# Patient Record
Sex: MALE | Race: WHITE | NOT HISPANIC OR LATINO | Employment: FULL TIME | URBAN - METROPOLITAN AREA
[De-identification: names, ages, dates, MRNs, and addresses within clinical notes are randomized per-mention and may not be internally consistent; named-entity substitution may affect disease eponyms.]

---

## 2017-03-27 DIAGNOSIS — Z13.220 SCREENING FOR HYPERLIPIDEMIA: ICD-10-CM

## 2017-03-27 DIAGNOSIS — B20 HUMAN IMMUNODEFICIENCY VIRUS (HIV) DISEASE (H): ICD-10-CM

## 2017-03-27 DIAGNOSIS — B20 HUMAN IMMUNODEFICIENCY VIRUS (HIV) DISEASE (H): Primary | ICD-10-CM

## 2017-03-27 LAB
ALBUMIN SERPL-MCNC: 4.3 G/DL (ref 3.4–5)
ALP SERPL-CCNC: 98 U/L (ref 40–150)
ALT SERPL W P-5'-P-CCNC: 92 U/L (ref 0–70)
AMYLASE SERPL-CCNC: 66 U/L (ref 30–110)
ANION GAP SERPL CALCULATED.3IONS-SCNC: 9 MMOL/L (ref 3–14)
AST SERPL W P-5'-P-CCNC: 46 U/L (ref 0–45)
BASOPHILS # BLD AUTO: 0 10E9/L (ref 0–0.2)
BASOPHILS NFR BLD AUTO: 0.1 %
BILIRUB SERPL-MCNC: 0.2 MG/DL (ref 0.2–1.3)
BUN SERPL-MCNC: 10 MG/DL (ref 7–30)
CALCIUM SERPL-MCNC: 8.6 MG/DL (ref 8.5–10.1)
CHLORIDE SERPL-SCNC: 104 MMOL/L (ref 94–109)
CHOLEST SERPL-MCNC: 182 MG/DL
CO2 SERPL-SCNC: 28 MMOL/L (ref 20–32)
CREAT SERPL-MCNC: 0.83 MG/DL (ref 0.66–1.25)
DIFFERENTIAL METHOD BLD: NORMAL
EOSINOPHIL # BLD AUTO: 0.1 10E9/L (ref 0–0.7)
EOSINOPHIL NFR BLD AUTO: 1.6 %
ERYTHROCYTE [DISTWIDTH] IN BLOOD BY AUTOMATED COUNT: 13.6 % (ref 10–15)
GFR SERPL CREATININE-BSD FRML MDRD: ABNORMAL ML/MIN/1.7M2
GLUCOSE SERPL-MCNC: 89 MG/DL (ref 70–99)
HCT VFR BLD AUTO: 42.8 % (ref 40–53)
HDLC SERPL-MCNC: 41 MG/DL
HGB BLD-MCNC: 14.7 G/DL (ref 13.3–17.7)
IMM GRANULOCYTES # BLD: 0 10E9/L (ref 0–0.4)
IMM GRANULOCYTES NFR BLD: 0.3 %
LDLC SERPL CALC-MCNC: 110 MG/DL
LIPASE SERPL-CCNC: 220 U/L (ref 73–393)
LYMPHOCYTES # BLD AUTO: 2.6 10E9/L (ref 0.8–5.3)
LYMPHOCYTES NFR BLD AUTO: 38.8 %
MCH RBC QN AUTO: 28.3 PG (ref 26.5–33)
MCHC RBC AUTO-ENTMCNC: 34.3 G/DL (ref 31.5–36.5)
MCV RBC AUTO: 83 FL (ref 78–100)
MONOCYTES # BLD AUTO: 0.5 10E9/L (ref 0–1.3)
MONOCYTES NFR BLD AUTO: 7.9 %
NEUTROPHILS # BLD AUTO: 3.4 10E9/L (ref 1.6–8.3)
NEUTROPHILS NFR BLD AUTO: 51.3 %
NONHDLC SERPL-MCNC: 140 MG/DL
NRBC # BLD AUTO: 0 10*3/UL
NRBC BLD AUTO-RTO: 0 /100
PLATELET # BLD AUTO: 260 10E9/L (ref 150–450)
POTASSIUM SERPL-SCNC: 3.8 MMOL/L (ref 3.4–5.3)
PROT SERPL-MCNC: 7.9 G/DL (ref 6.8–8.8)
RBC # BLD AUTO: 5.19 10E12/L (ref 4.4–5.9)
SODIUM SERPL-SCNC: 140 MMOL/L (ref 133–144)
TRIGL SERPL-MCNC: 153 MG/DL
WBC # BLD AUTO: 6.7 10E9/L (ref 4–11)

## 2017-03-27 NOTE — NURSING NOTE
Per [facility] Ambulatory Care Protocol, Pt is due for routine labs based on disease state or monitoring of medications. Lab orders entered.  Serenity Martin RN

## 2017-03-28 LAB
CD3 CELLS # BLD: 2093 CELLS/UL (ref 603–2990)
CD3 CELLS NFR BLD: 79 % (ref 49–84)
CD3+CD4+ CELLS # BLD: 990 CELLS/UL (ref 441–2156)
CD3+CD4+ CELLS NFR BLD: 37 % (ref 28–63)
CD3+CD4+ CELLS/CD3+CD8+ CLL BLD: 1.09 % (ref 1.4–2.6)
CD3+CD8+ CELLS # BLD: 914 CELLS/UL (ref 125–1312)
CD3+CD8+ CELLS NFR BLD: 34 % (ref 10–40)
HIV1 RNA # PLAS NAA DL=20: NORMAL {COPIES}/ML
HIV1 RNA SERPL NAA+PROBE-LOG#: NORMAL {LOG_COPIES}/ML
IFC SPECIMEN: ABNORMAL
IMMUNODEFICIENCY MARKERS SPEC-IMP: ABNORMAL

## 2017-04-06 ENCOUNTER — OFFICE VISIT (OUTPATIENT)
Dept: INFECTIOUS DISEASES | Facility: CLINIC | Age: 42
End: 2017-04-06
Attending: COLON & RECTAL SURGERY
Payer: COMMERCIAL

## 2017-04-06 VITALS
SYSTOLIC BLOOD PRESSURE: 126 MMHG | HEIGHT: 69 IN | DIASTOLIC BLOOD PRESSURE: 83 MMHG | TEMPERATURE: 98.3 F | HEART RATE: 83 BPM | WEIGHT: 157.8 LBS | BODY MASS INDEX: 23.37 KG/M2

## 2017-04-06 DIAGNOSIS — B20 HUMAN IMMUNODEFICIENCY VIRUS (HIV) DISEASE (H): Primary | ICD-10-CM

## 2017-04-06 PROCEDURE — 99213 OFFICE O/P EST LOW 20 MIN: CPT | Mod: ZF

## 2017-04-06 ASSESSMENT — PAIN SCALES - GENERAL: PAINLEVEL: NO PAIN (0)

## 2017-04-06 NOTE — PROGRESS NOTES
Infectious Disease Clinic  HIV Follow Up Visit    Chief Complaint:  RECHECK (follow up with B20)    HPI:  Shan Rashid is a 42 year old male who is followed for HIV.   Patient was last seen 4/7/16.  Since this time, he reports doing well.  He states that he has maybe missed 1 or 2 doses of his Atripla all year. He has not had any new infections, or hospitalization since last visit. He denies fever, chills, headache, vision changes, oral ulcers/plaques, SOB/cough, abdominal pain/N/V, urinary symptoms, diarrhea, genital ulcers / sores.     He is monogamous with his . His  is HIV uninfected and takes PreP.     Past Medical History:  1. HIV diagnosed in 07/2009. Started on Atripla 08/2009. CD4 stanislav 234 (11%) in 07/2009.   2. Recurrent skin abscesses, last in 2011.   3. History of chlamydia treated in 2011.   4. History of depression, now resolved.   5. GERD.     Allergies: No known drug allergies    Current Medications::   1. Atripla.   2. Multivitamin.       ROS: Complete 12-point ROS is negative except as noted above. A 12-point review of systems was obtained, pertinent positives and negatives as above.     Social History:  Patient drinks 2-3 beers a week. He does not smoke or use ilicit drugs.  He is monogamous with his .  takes PreP.     Family Medical History:  Reviewed and unchanged from prior.    Exam:  B/P: 126/83, T: 98.3, P: 83, R: Data Unavailable, Weight:   Wt Readings from Last 2 Encounters:   04/06/17 71.6 kg (157 lb 12.8 oz)   04/07/16 70.6 kg (155 lb 9.6 oz)     GENERAL: He is a healthy-appearing gentleman in no acute distress.   HEENT: Oropharynx moist and clear with no lesions or exudate.   NECK: Supple and nontender with no lymphadenopathy.   LUNGS: Clear to auscultation bilaterally.   HEART: Regular rate and rhythm with no murmurs, rubs or gallops.   ABDOMEN: Soft, nontender, nondistended   EXTREMITIES: Warm and without edema.   SKIN: No acute rashes or lesions.    NEUROLOGIC: Alert and oriented. Gait unremarkable.     Labs:  T Cell Subset:  Absolute CD4   Date Value Ref Range Status   03/27/2017 990 441 - 2156 cells/uL Final     CD4 [place] T   Date Value Ref Range Status   03/27/2017 37 28 - 63 % Final     CD8 Suppressor T   Date Value Ref Range Status   03/27/2017 34 10 - 40 % Final     CD3 Mature T   Date Value Ref Range Status   03/27/2017 79 49 - 84 % Final     CD4:CD8 Ratio   Date Value Ref Range Status   03/27/2017 1.09 (L) 1.40 - 2.60 Final     WBC   Date Value Ref Range Status   03/27/2017 6.7 4.0 - 11.0 10e9/L Final     % Lymphocytes   Date Value Ref Range Status   03/27/2017 38.8 % Final     Absolute CD3   Date Value Ref Range Status   03/27/2017 2093 603 - 2990 cells/uL Final     Absolute CD8   Date Value Ref Range Status   03/27/2017 914 125 - 1312 cells/uL Final       HIV-1 RNA Quantitative:  HIV-1 RNA Quant Result   Date Value Ref Range Status   03/27/2017  HIVND [Copies]/mL Final    HIV-1 RNA Not Detected   The KORINA AmpliPrep/KORINA TaqMan HIV-1 test is an FDA-approved in vitro nucleic   acid amplification test for the quantitation of HIV-1 RNA in human plasma (EDTA   plasma) using the KORINA AmpliPrep instrument for automated viral nucleic acid   extraction and the KORINA TaqMan Analyzer or KORINA TaqMan for automated Real   Time PCR amplification and detection of the viral nucleic acid target.   Titer results are reported in copies/ml. This assay is intended for use in   conjunction with clinical presentation and other laboratory markers of disease   prognosis and for use as an aid in assessing viral response to antiretroviral   treatment as measured by changes in plasma HIV-1 RNA levels. This test should   not be used as a donor screening test to confirm the presence of HIV-1   infection.           Assessment and Plan:  Shan Rashid is a 42 year old male with HIV infection who presents for routine follow up.     1. HIV  He has been well controlled on  Atripla since ~ 2009. His viral load is undetectable and his CD4 990 today. We discussed switching away from Atripla and on to Genvoya due to concerns for long term toxicity Atripla (pt actually denies side effects completey) and the patient is agreeable.   -  Pt will finish up his supply of Atripla (approx 2 weeks) then switch to Genvoya    2. Transaminitis  Mild. Pt denies significant alcohol or acetaminophen use. Likely related to anti-retroviral meds.   - repeat in 6-8 weeks once on Genvoya    3. Routine HIV care.          Prophylaxis:   a. None indicated given CD4 count.          Routine Infectious disease screening.    a. Hepatitis B immune.    b. Hepatitis C negative.    c. Toxo IgG negative.    d. CMV IgG positive.    e. QuantiFERON Gold negative in 07/2009 with no risk factors for acquisition since.    f. Declines STI screening today as he is in a monogamous relationship.         Vaccination status.    a. Hepatitis A vaccine series complete, Hep A immune   b. Hepatitis B immune.    c. Pneumovax given in 2009, booster 2015.    d. Prevnar 07/2014.    e. Will encourage yearly influenza vaccination.    f. Menactra series complete.         Cardiovascular and renal risk factors.    a. Creatinine and fasting glucose have been within normal limits.   c. Lipids checked 3/2017: , total cholesterol 182    d. BP normal today (136/83)   e. He is a nonsmoker.    f. He is a healthy weight and exercises regularly.           Cancer screening.    a. due for Anal Pap. Will plan on completing at next visit.        Follow up will be in 6-8 weeks for repeat labs after switch to Genvoya. Anal Pap at that time.     Patient seen and discussed with attending physician Dr. Niecy Cmaejo  PGY-3 Internal Medicine  [facility] [phone]    Attending Attestation:  I evaluated Mr. Rashid with resident Dr. Camejo.  I have reviewed today's labs, vitals and imaging results. This note reflects our joint findings, assessment and  recommendations. Will switch from Atripla to Genvoya today and have him return to clinic about a month after this switch. He knows to contact me if issues arise prior.     Lisa Pemberton MD  [phone]

## 2017-04-06 NOTE — NURSING NOTE
"Chief Complaint   Patient presents with     RECHECK     follow up with B20, tzimmer cma       Initial /83  Pulse 83  Temp 98.3  F (36.8  C) (Oral)  Ht 1.753 m (5' 9\")  Wt 71.6 kg (157 lb 12.8 oz)  BMI 23.3 kg/m2 Estimated body mass index is 23.3 kg/(m^2) as calculated from the following:    Height as of this encounter: 1.753 m (5' 9\").    Weight as of this encounter: 71.6 kg (157 lb 12.8 oz).  Medication Reconciliation: complete    "

## 2017-04-06 NOTE — MR AVS SNAPSHOT
"              After Visit Summary   2017    Shan Rashid    MRN: [id number]           Patient Information     Date Of Birth          1975        Visit Information        Provider Department      2017 1:30 PM Lisa Pemberton MD [facility] Infectious Diseases        Today's Diagnoses     Human immunodeficiency virus (HIV) disease (H)    -  1       Follow-ups after your visit        Follow-up notes from your care team     Return in about 8 weeks (around 2017) for Routine Visit.      Who to contact     If you have questions or need follow up information about today's clinic visit or your schedule please contact [facility] AND INFECTIOUS DISEASES directly at [phone].  Normal or non-critical lab and imaging results will be communicated to you by MyChart, letter or phone within 4 business days after the clinic has received the results. If you do not hear from us within 7 days, please contact the clinic through [employer] or phone. If you have a critical or abnormal lab result, we will notify you by phone as soon as possible.  Submit refill requests through [employer] or call your pharmacy and they will forward the refill request to us. Please allow 3 business days for your refill to be completed.          Additional Information About Your Visit        MyChart Information     [employer] lets you send messages to your doctor, view your test results, renew your prescriptions, schedule appointments and more. To sign up, go to [email or web address] . Click on \"Log in\" on the left side of the screen, which will take you to the Welcome page. Then click on \"Sign up Now\" on the right side of the page.     You will be asked to enter the access code listed below, as well as some personal information. Please follow the directions to create your username and password.     Your access code is: [email or web address]  Expires: 2017  6:30 AM     Your access code will  in 90 days. " "If you need help or a new code, please call your [place] clinic or [phone].        Care EveryWhere ID     This is your Care EveryWhere ID. This could be used by other organizations to access your [place] medical records  [id number]        Your Vitals Were     Pulse Temperature Height BMI (Body Mass Index)          83 98.3  F (36.8  C) (Oral) 1.753 m (5' 9\") 23.3 kg/m2         Blood Pressure from Last 3 Encounters:   04/06/17 126/83   04/07/16 (!) 124/97   08/13/15 (!) 125/92    Weight from Last 3 Encounters:   04/06/17 71.6 kg (157 lb 12.8 oz)   04/07/16 70.6 kg (155 lb 9.6 oz)   08/13/15 68.4 kg (150 lb 14.4 oz)                 Today's Medication Changes          These changes are accurate as of: 4/6/17 11:59 PM.  If you have any questions, ask your nurse or doctor.               Stop taking these medicines if you haven't already. Please contact your care team if you have questions.     ATRIPLA 600-200-300 MG per tablet   Generic drug:  efavirenz-emtrictabine-tenofovir   Stopped by:  Lisa Pmeberton MD           sulfamethoxazole-trimethoprim 800-160 MG per tablet   Commonly known as:  BACTRIM DS   Stopped by:  Lisa Pemberton MD                    Primary Care Provider    None Doctor, MD       No address on file        Thank you!     Thank you for choosing [facility] AND INFECTIOUS DISEASES  for your care. Our goal is always to provide you with excellent care. Hearing back from our patients is one way we can continue to improve our services. Please take a few minutes to complete the written survey that you may receive in the mail after your visit with us. Thank you!             Your Updated Medication List - Protect others around you: Learn how to safely use, store and throw away your medicines at www.disposemymeds.org.          This list is accurate as of: 4/6/17 11:59 PM.  Always use your most recent med list.                   Brand Name Dispense Instructions for use    " DAILY MULTIVITAMIN PO      Take  by mouth daily.

## 2017-04-06 NOTE — LETTER
4/6/2017       RE: Shan Rashid  [address]  [place] [address]     Dear Colleague,    Thank you for referring your patient, Shan Rashid, to the [facility] AND INFECTIOUS DISEASES at [facility]. Please see a copy of my visit note below.    Infectious Disease Clinic  HIV Follow Up Visit    Chief Complaint:  RECHECK (follow up with B20)    HPI:  Shan Rashid is a 42 year old male who is followed for HIV.   Patient was last seen 4/7/16.  Since this time, he reports doing well.  He states that he has maybe missed 1 or 2 doses of his Atripla all year. He has not had any new infections, or hospitalization since last visit. He denies fever, chills, headache, vision changes, oral ulcers/plaques, SOB/cough, abdominal pain/N/V, urinary symptoms, diarrhea, genital ulcers / sores.     He is monogamous with his . His  is HIV uninfected and takes PreP.     Past Medical History:  1. HIV diagnosed in 07/2009. Started on Atripla 08/2009. CD4 stanislav 234 (11%) in 07/2009.   2. Recurrent skin abscesses, last in 2011.   3. History of chlamydia treated in 2011.   4. History of depression, now resolved.   5. GERD.     Allergies: No known drug allergies    Current Medications::   1. Atripla.   2. Multivitamin.       ROS: Complete 12-point ROS is negative except as noted above. A 12-point review of systems was obtained, pertinent positives and negatives as above.     Social History:  Patient drinks 2-3 beers a week. He does not smoke or use ilicit drugs.  He is monogamous with his .  takes PreP.     Family Medical History:  Reviewed and unchanged from prior.    Exam:  B/P: 126/83, T: 98.3, P: 83, R: Data Unavailable, Weight:   Wt Readings from Last 2 Encounters:   04/06/17 71.6 kg (157 lb 12.8 oz)   04/07/16 70.6 kg (155 lb 9.6 oz)     GENERAL: He is a healthy-appearing gentleman in no acute distress.   HEENT: Oropharynx moist and  clear with no lesions or exudate.   NECK: Supple and nontender with no lymphadenopathy.   LUNGS: Clear to auscultation bilaterally.   HEART: Regular rate and rhythm with no murmurs, rubs or gallops.   ABDOMEN: Soft, nontender, nondistended   EXTREMITIES: Warm and without edema.   SKIN: No acute rashes or lesions.   NEUROLOGIC: Alert and oriented. Gait unremarkable.     Labs:  T Cell Subset:  Absolute CD4   Date Value Ref Range Status   03/27/2017 990 441 - 2156 cells/uL Final     CD4 [place] T   Date Value Ref Range Status   03/27/2017 37 28 - 63 % Final     CD8 Suppressor T   Date Value Ref Range Status   03/27/2017 34 10 - 40 % Final     CD3 Mature T   Date Value Ref Range Status   03/27/2017 79 49 - 84 % Final     CD4:CD8 Ratio   Date Value Ref Range Status   03/27/2017 1.09 (L) 1.40 - 2.60 Final     WBC   Date Value Ref Range Status   03/27/2017 6.7 4.0 - 11.0 10e9/L Final     % Lymphocytes   Date Value Ref Range Status   03/27/2017 38.8 % Final     Absolute CD3   Date Value Ref Range Status   03/27/2017 2093 603 - 2990 cells/uL Final     Absolute CD8   Date Value Ref Range Status   03/27/2017 914 125 - 1312 cells/uL Final       HIV-1 RNA Quantitative:  HIV-1 RNA Quant Result   Date Value Ref Range Status   03/27/2017  HIVND [Copies]/mL Final    HIV-1 RNA Not Detected   The KORINA AmpliPrep/KORINA TaqMan HIV-1 test is an FDA-approved in vitro nucleic   acid amplification test for the quantitation of HIV-1 RNA in human plasma (EDTA   plasma) using the KORINA AmpliPrep instrument for automated viral nucleic acid   extraction and the KORINA TaqMan Analyzer or KORINA TaqMan for automated Real   Time PCR amplification and detection of the viral nucleic acid target.   Titer results are reported in copies/ml. This assay is intended for use in   conjunction with clinical presentation and other laboratory markers of disease   prognosis and for use as an aid in assessing viral response to antiretroviral   treatment as measured  by changes in plasma HIV-1 RNA levels. This test should   not be used as a donor screening test to confirm the presence of HIV-1   infection.           Assessment and Plan:  Shan Rashid is a 42 year old male with HIV infection who presents for routine follow up.     1. HIV  He has been well controlled on Atripla since ~ 2009. His viral load is undetectable and his CD4 990 today. We discussed switching away from Atripla and on to Genvoya due to concerns for long term toxicity Atripla (pt actually denies side effects completey) and the patient is agreeable.   -  Pt will finish up his supply of Atripla (approx 2 weeks) then switch to Genvoya    2. Transaminitis  Mild. Pt denies significant alcohol or acetaminophen use. Likely related to anti-retroviral meds.   - repeat in 6-8 weeks once on Genvoya    3. Routine HIV care.          Prophylaxis:   a. None indicated given CD4 count.          Routine Infectious disease screening.    a. Hepatitis B immune.    b. Hepatitis C negative.    c. Toxo IgG negative.    d. CMV IgG positive.    e. QuantiFERON Gold negative in 07/2009 with no risk factors for acquisition since.    f. Declines STI screening today as he is in a monogamous relationship.         Vaccination status.    a. Hepatitis A vaccine series complete, Hep A immune   b. Hepatitis B immune.    c. Pneumovax given in 2009, booster 2015.    d. Prevnar 07/2014.    e. Will encourage yearly influenza vaccination.    f. Menactra series complete.         Cardiovascular and renal risk factors.    a. Creatinine and fasting glucose have been within normal limits.   c. Lipids checked 3/2017: , total cholesterol 182    d. BP normal today (136/83)   e. He is a nonsmoker.    f. He is a healthy weight and exercises regularly.           Cancer screening.    a. due for Anal Pap. Will plan on completing at next visit.        Follow up will be in 6-8 weeks for repeat labs after switch to Genvoya. Anal Pap at that time.      Patient seen and discussed with attending physician Dr. Niecy Camejo  PGY-3 Internal Medicine  pgr [phone]    Attending Attestation:  I evaluated Mr. Rashid with resident Dr. Camejo.  I have reviewed today's labs, vitals and imaging results. This note reflects our joint findings, assessment and recommendations. Will switch from Atripla to Genvoya today and have him return to clinic about a month after this switch. He knows to contact me if issues arise prior.     Lisa Pemberton MD  [phone]        Again, thank you for allowing me to participate in the care of your patient.      Sincerely,    Lisa Pemberton MD

## 2017-04-06 NOTE — LETTER
Date:April 11, 2017      Patient was self referred, no letter generated. Do not send.        [facility] Information

## 2017-04-11 DIAGNOSIS — B20 HUMAN IMMUNODEFICIENCY VIRUS (HIV) DISEASE (H): Primary | ICD-10-CM

## 2017-05-17 DIAGNOSIS — B20 HUMAN IMMUNODEFICIENCY VIRUS (HIV) DISEASE (H): ICD-10-CM

## 2017-05-17 LAB
ALBUMIN SERPL-MCNC: 3.9 G/DL (ref 3.4–5)
ALP SERPL-CCNC: 94 U/L (ref 40–150)
ALT SERPL W P-5'-P-CCNC: 78 U/L (ref 0–70)
ANION GAP SERPL CALCULATED.3IONS-SCNC: 10 MMOL/L (ref 3–14)
AST SERPL W P-5'-P-CCNC: 41 U/L (ref 0–45)
BILIRUB SERPL-MCNC: 0.2 MG/DL (ref 0.2–1.3)
BUN SERPL-MCNC: 11 MG/DL (ref 7–30)
CALCIUM SERPL-MCNC: 8.6 MG/DL (ref 8.5–10.1)
CHLORIDE SERPL-SCNC: 103 MMOL/L (ref 94–109)
CO2 SERPL-SCNC: 24 MMOL/L (ref 20–32)
CREAT SERPL-MCNC: 0.79 MG/DL (ref 0.66–1.25)
ERYTHROCYTE [DISTWIDTH] IN BLOOD BY AUTOMATED COUNT: 13 % (ref 10–15)
GFR SERPL CREATININE-BSD FRML MDRD: ABNORMAL ML/MIN/1.7M2
GLUCOSE SERPL-MCNC: 112 MG/DL (ref 70–99)
HCT VFR BLD AUTO: 40.7 % (ref 40–53)
HGB BLD-MCNC: 14.1 G/DL (ref 13.3–17.7)
MCH RBC QN AUTO: 29.1 PG (ref 26.5–33)
MCHC RBC AUTO-ENTMCNC: 34.6 G/DL (ref 31.5–36.5)
MCV RBC AUTO: 84 FL (ref 78–100)
PLATELET # BLD AUTO: 226 10E9/L (ref 150–450)
POTASSIUM SERPL-SCNC: 3.5 MMOL/L (ref 3.4–5.3)
PROT SERPL-MCNC: 7.5 G/DL (ref 6.8–8.8)
RBC # BLD AUTO: 4.84 10E12/L (ref 4.4–5.9)
SODIUM SERPL-SCNC: 138 MMOL/L (ref 133–144)
WBC # BLD AUTO: 5.5 10E9/L (ref 4–11)

## 2017-05-19 LAB
HIV1 RNA # PLAS NAA DL=20: ABNORMAL {COPIES}/ML
HIV1 RNA SERPL NAA+PROBE-LOG#: <1.3 {LOG_COPIES}/ML

## 2017-05-24 ENCOUNTER — TELEPHONE (OUTPATIENT)
Dept: INFECTIOUS DISEASES | Facility: CLINIC | Age: 42
End: 2017-05-24

## 2017-05-25 ENCOUNTER — OFFICE VISIT (OUTPATIENT)
Dept: INFECTIOUS DISEASES | Facility: CLINIC | Age: 42
End: 2017-05-25
Attending: COLON & RECTAL SURGERY
Payer: COMMERCIAL

## 2017-05-25 VITALS
WEIGHT: 158.9 LBS | SYSTOLIC BLOOD PRESSURE: 144 MMHG | HEIGHT: 69 IN | TEMPERATURE: 97.6 F | BODY MASS INDEX: 23.54 KG/M2 | HEART RATE: 78 BPM | DIASTOLIC BLOOD PRESSURE: 89 MMHG

## 2017-05-25 DIAGNOSIS — Z21 ASYMPTOMATIC HUMAN IMMUNODEFICIENCY VIRUS (HIV) INFECTION STATUS (H): Primary | ICD-10-CM

## 2017-05-25 PROCEDURE — 99212 OFFICE O/P EST SF 10 MIN: CPT | Mod: ZF

## 2017-05-25 PROCEDURE — 88112 CYTOPATH CELL ENHANCE TECH: CPT | Performed by: COLON & RECTAL SURGERY

## 2017-05-25 ASSESSMENT — PAIN SCALES - GENERAL: PAINLEVEL: NO PAIN (0)

## 2017-05-25 NOTE — LETTER
5/25/2017       RE: Shan Rashid  [address]  [place] [address]     Dear Colleague,    Thank you for referring your patient, Shan Rashid, to the [facility] AND INFECTIOUS DISEASES at [facility]. Please see a copy of my visit note below.    Infectious Disease Clinic  HIV Follow Up Visit    Chief Complaint:  RECHECK (follow up B20, medication check)    HPI:  Shan Rashid is a 42 year old male who is followed for HIV. Patient was last seen in clinic on 4/6/17 at which time his Atripla was discontinued and started on Genvoya.     Patient reports he is tolerating his Genvoya well. Denies fevers, chills, night sweats, weight loss, chest pain, cough, sob, abd pain, n/v, diarrhea or constipation. The only difficulty is switching from taking his Atripla at night, to now taking Genvoya during the day. Denies any missed doses.     ROS: Complete 12-point ROS is negative except as noted above.    Past Medical History:  Past Medical History:   Diagnosis Date     Depression      Human immunodeficiency virus (HIV) disease (H) Diagnosed July, 2009.    Started on Atripla 8/13/09.       Perirectal abscess October, 2010.    Responded to therapy with bactrim.     Social History:  Social History     Social History     Marital status:      Spouse name: N/A     Number of children: N/A     Years of education: N/A     Occupational History     Not on file.     Social History Main Topics     Smoking status: Former Smoker     Years: 3.00     Types: Cigarettes     Quit date: 9/22/2000     Smokeless tobacco: Never Used     Alcohol use Yes      Comment: 2 drinks week, usually beer     Drug use: No     Sexual activity: Yes     Partners: Male     Birth control/ protection: Condom     Other Topics Concern     Not on file     Social History Narrative       Family Medical History:  Reviewed and unchanged from prior.    Allergies:     Allergies   Allergen  "Reactions     Nkda [No Known Drug Allergies]        Medications:  Current Outpatient Prescriptions   Medication Sig Dispense Refill     GENVOYA [phone] mg tablet Take 1 tablet by mouth daily (with breakfast) 30 tablet 11     Multiple Vitamin (DAILY MULTIVITAMIN PO) Take  by mouth daily.       Immunizations:  Immunization History   Administered Date(s) Administered     Influenza (IIV3) 11/15/2011     Influenza Vaccine IM 3yrs+ 4 Valent IIV4 11/20/2015     Meningococcal (Menactra ) 08/13/2015, 11/20/2015     Exam:  /89  Pulse 78  Temp 97.6  F (36.4  C) (Oral)  Ht 1.753 m (5' 9\")  Wt 72.1 kg (158 lb 14.4 oz)  BMI 23.47 kg/m2    Wt Readings from Last 2 Encounters:   05/25/17 72.1 kg (158 lb 14.4 oz)   04/06/17 71.6 kg (157 lb 12.8 oz)   General: normal weight man, alert, pleasant and cooperative, NAD  HEENT: NCAT, anicteric sclera, PERRL, EOMI, OP clear and MMM, neck is supple and no LAD  CV: RRR, nl S1/S2, no S3/S4 appreciated, no m/r/g; intact & symmetric 2+ pulses (radial)  Lungs: equal b/l air entry, no wheezing/crackles, no cough  Abd: soft, NT, ND, normal bowel sounds, no HSM or masses palpable  Ext: WWP, no BLE edema  Skin: no rashes, cyanosis, or jaundice  Rectal: no e/o external hemorrhoids, normal perirectal exam with normal sphincter tone.  Neuro: AOx3, strength is 5/5 in UE and LE b/l, normal sensation throughout.       Labs:  T Cell Subset:  Absolute CD4   Date Value Ref Range Status   03/27/2017 990 441 - 2156 cells/uL Final     CD4 [place] T   Date Value Ref Range Status   03/27/2017 37 28 - 63 % Final     CD8 Suppressor T   Date Value Ref Range Status   03/27/2017 34 10 - 40 % Final     CD3 Mature T   Date Value Ref Range Status   03/27/2017 79 49 - 84 % Final     CD4:CD8 Ratio   Date Value Ref Range Status   03/27/2017 1.09 (L) 1.40 - 2.60 Final     WBC   Date Value Ref Range Status   05/17/2017 5.5 4.0 - 11.0 10e9/L Final     % Lymphocytes   Date Value Ref Range Status   03/27/2017 " 38.8 % Final     Absolute CD3   Date Value Ref Range Status   03/27/2017 2093 603 - 2990 cells/uL Final     Absolute CD8   Date Value Ref Range Status   03/27/2017 914 125 - 1312 cells/uL Final       HIV-1 RNA Quantitative:  HIV-1 RNA Quant Result   Date Value Ref Range Status   05/17/2017 (A) HIVND [Copies]/mL Final    <20  HIV-1 RNA Detected, less than 20 HIV-1 RNA copies/mL   The KORINA AmpliPrep/KORINA TaqMan HIV-1 test is an FDA-approved in vitro nucleic   acid amplification test for the quantitation of HIV-1 RNA in human plasma (EDTA   plasma) using the KORINA AmpliPrep instrument for automated viral nucleic acid   extraction and the KORINA TaqMan Analyzer or KORINA TaqMan for automated Real   Time PCR amplification and detection of the viral nucleic acid target.   Titer results are reported in copies/ml. This assay is intended for use in   conjunction with clinical presentation and other laboratory markers of disease   prognosis and for use as an aid in assessing viral response to antiretroviral   treatment as measured by changes in plasma HIV-1 RNA levels. This test should   not be used as a donor screening test to confirm the presence of HIV-1   infection.       Assessment and Plan:  Shan Rashid is a 42 year old male with HIV infection who presents for routine follow up following medication change and also to have anal pap smear be performed.      1. HIV  Patient had had excellent adherence with Atripla (2009 - 4/2017) and was switched to Genvoya in 4/2017 due tot he better side effect profile with long term use of Genvoya. Patient has tolerated the medication well and his VL remains within normal limits.   - continue Genvoya  - RTC in 1 year with repeat labs     2. Transaminitis  Mild. Pt denies significant alcohol or acetaminophen use. Likely related to anti-retroviral meds at Atripla does have a 2% reported incidence of elevated ALT as well as steatohepatitis. Genvoya has similar risk of steatohepatitis  but no observed incidence of elevated transaminases. ALT is improved today and suspect that will continue to improve since stopping the Atripla.    - repeat LFTs in 3 months   - consider further work-up if ALT fails to return to normal    3. HTN  Blood pressure is slightly elevated today with SBP >140. Patient attributes it to running in this morning for appt. Will continue to monitor and consider sending home with BP cuff for monitoring if BP elevated at follow up. Currently asymptomatic.   - continue to monitor at follow up.      4. Routine HIV care.          Prophylaxis:                         a. None indicated given CD4 count.          Routine Infectious disease screening.                          a. Hepatitis B immune.                          b. Hepatitis C negative.                          c. Toxo IgG negative.                          d. CMV IgG positive.                          e. QuantiFERON Gold negative in 07/2009 with no risk factors for acquisition since.                          f. Declines STI screening today as he is in a monogamous relationship.         Vaccination status.                          a. Hepatitis A vaccine series complete, Hep A immune                         b. Hepatitis B immune.                          c. Pneumovax given in 2009, booster 2015.                          d. Prevnar 07/2014.                          e. Will encourage yearly influenza vaccination.                          f. Menactra series complete.         Cardiovascular and renal risk factors.                          a. Creatinine and fasting glucose have been within normal limits.                         c. Lipids checked 3/2017: , total cholesterol 182                          d. BP normal today (136/83)                         e. He is a nonsmoker.                          f. He is a healthy weight and exercises regularly.           Cancer screening.                          a.  Anal pap smear  completed on 5/25/17 (repeat 5/2018). Will follow up with patient regarding the results.       Repeat LFT's in 3 months (lab visit only) and RTC in 1 year for routine follow up.       Patient seen and discussed with attending physician Dr. Niecy Shelton MD  PGY-2 Internal Medicine  P: [phone]    Attending Attestation:  I evaluated Mr. Rashid with resident Dr. Shelton.  I have reviewed today's labs, vitals and imaging results and have edited this note to reflect our joint findings, assessment and recommendations.    Lisa Pemberton MD  [phone]        Again, thank you for allowing me to participate in the care of your patient.      Sincerely,    Lisa Pemberton MD

## 2017-05-25 NOTE — NURSING NOTE
"Chief Complaint   Patient presents with     RECHECK     follow up B20, medication check       Initial /89  Pulse 78  Temp 97.6  F (36.4  C) (Oral)  Ht 1.753 m (5' 9\")  Wt 72.1 kg (158 lb 14.4 oz)  BMI 23.47 kg/m2 Estimated body mass index is 23.47 kg/(m^2) as calculated from the following:    Height as of this encounter: 1.753 m (5' 9\").    Weight as of this encounter: 72.1 kg (158 lb 14.4 oz).  Medication Reconciliation: complete  "

## 2017-05-25 NOTE — PROGRESS NOTES
Infectious Disease Clinic  HIV Follow Up Visit    Chief Complaint:  RECHECK (follow up B20, medication check)    HPI:  Shan Rashid is a 42 year old male who is followed for HIV. Patient was last seen in clinic on 4/6/17 at which time his Atripla was discontinued and started on Genvoya.     Patient reports he is tolerating his Genvoya well. Denies fevers, chills, night sweats, weight loss, chest pain, cough, sob, abd pain, n/v, diarrhea or constipation. The only difficulty is switching from taking his Atripla at night, to now taking Genvoya during the day. Denies any missed doses.     ROS: Complete 12-point ROS is negative except as noted above.    Past Medical History:  Past Medical History:   Diagnosis Date     Depression      Human immunodeficiency virus (HIV) disease (H) Diagnosed July, 2009.    Started on Atripla 8/13/09.       Perirectal abscess October, 2010.    Responded to therapy with bactrim.     Social History:  Social History     Social History     Marital status:      Spouse name: N/A     Number of children: N/A     Years of education: N/A     Occupational History     Not on file.     Social History Main Topics     Smoking status: Former Smoker     Years: 3.00     Types: Cigarettes     Quit date: 9/22/2000     Smokeless tobacco: Never Used     Alcohol use Yes      Comment: 2 drinks week, usually beer     Drug use: No     Sexual activity: Yes     Partners: Male     Birth control/ protection: Condom     Other Topics Concern     Not on file     Social History Narrative       Family Medical History:  Reviewed and unchanged from prior.    Allergies:     Allergies   Allergen Reactions     Nkda [No Known Drug Allergies]        Medications:  Current Outpatient Prescriptions   Medication Sig Dispense Refill     GENVOYA [phone] mg tablet Take 1 tablet by mouth daily (with breakfast) 30 tablet 11     Multiple Vitamin (DAILY MULTIVITAMIN PO) Take  by mouth daily.       Immunizations:  Immunization  "History   Administered Date(s) Administered     Influenza (IIV3) 11/15/2011     Influenza Vaccine IM 3yrs+ 4 Valent IIV4 11/20/2015     Meningococcal (Menactra ) 08/13/2015, 11/20/2015     Exam:  /89  Pulse 78  Temp 97.6  F (36.4  C) (Oral)  Ht 1.753 m (5' 9\")  Wt 72.1 kg (158 lb 14.4 oz)  BMI 23.47 kg/m2    Wt Readings from Last 2 Encounters:   05/25/17 72.1 kg (158 lb 14.4 oz)   04/06/17 71.6 kg (157 lb 12.8 oz)   General: normal weight man, alert, pleasant and cooperative, NAD  HEENT: NCAT, anicteric sclera, PERRL, EOMI, OP clear and MMM, neck is supple and no LAD  CV: RRR, nl S1/S2, no S3/S4 appreciated, no m/r/g; intact & symmetric 2+ pulses (radial)  Lungs: equal b/l air entry, no wheezing/crackles, no cough  Abd: soft, NT, ND, normal bowel sounds, no HSM or masses palpable  Ext: WWP, no BLE edema  Skin: no rashes, cyanosis, or jaundice  Rectal: no e/o external hemorrhoids, normal perirectal exam with normal sphincter tone.  Neuro: AOx3, strength is 5/5 in UE and LE b/l, normal sensation throughout.       Labs:  T Cell Subset:  Absolute CD4   Date Value Ref Range Status   03/27/2017 990 441 - 2156 cells/uL Final     CD4 [place] T   Date Value Ref Range Status   03/27/2017 37 28 - 63 % Final     CD8 Suppressor T   Date Value Ref Range Status   03/27/2017 34 10 - 40 % Final     CD3 Mature T   Date Value Ref Range Status   03/27/2017 79 49 - 84 % Final     CD4:CD8 Ratio   Date Value Ref Range Status   03/27/2017 1.09 (L) 1.40 - 2.60 Final     WBC   Date Value Ref Range Status   05/17/2017 5.5 4.0 - 11.0 10e9/L Final     % Lymphocytes   Date Value Ref Range Status   03/27/2017 38.8 % Final     Absolute CD3   Date Value Ref Range Status   03/27/2017 2093 603 - 2990 cells/uL Final     Absolute CD8   Date Value Ref Range Status   03/27/2017 914 125 - 1312 cells/uL Final       HIV-1 RNA Quantitative:  HIV-1 RNA Quant Result   Date Value Ref Range Status   05/17/2017 (A) HIVND [Copies]/mL Final    <20  HIV-1 " RNA Detected, less than 20 HIV-1 RNA copies/mL   The KORINA AmpliPrep/KORINA TaqMan HIV-1 test is an FDA-approved in vitro nucleic   acid amplification test for the quantitation of HIV-1 RNA in human plasma (EDTA   plasma) using the KORINA AmpliPrep instrument for automated viral nucleic acid   extraction and the KORINA TaqMan Analyzer or KORINA TaqMan for automated Real   Time PCR amplification and detection of the viral nucleic acid target.   Titer results are reported in copies/ml. This assay is intended for use in   conjunction with clinical presentation and other laboratory markers of disease   prognosis and for use as an aid in assessing viral response to antiretroviral   treatment as measured by changes in plasma HIV-1 RNA levels. This test should   not be used as a donor screening test to confirm the presence of HIV-1   infection.       Assessment and Plan:  Shan Rashid is a 42 year old male with HIV infection who presents for routine follow up following medication change and also to have anal pap smear be performed.      1. HIV  Patient had had excellent adherence with Atripla (2009 - 4/2017) and was switched to Genvoya in 4/2017 due tot he better side effect profile with long term use of Genvoya. Patient has tolerated the medication well and his VL remains within normal limits.   - continue Genvoya  - RTC in 1 year with repeat labs     2. Transaminitis  Mild. Pt denies significant alcohol or acetaminophen use. Likely related to anti-retroviral meds at Atripla does have a 2% reported incidence of elevated ALT as well as steatohepatitis. Genvoya has similar risk of steatohepatitis but no observed incidence of elevated transaminases. ALT is improved today and suspect that will continue to improve since stopping the Atripla.    - repeat LFTs in 3 months   - consider further work-up if ALT fails to return to normal    3. HTN  Blood pressure is slightly elevated today with SBP >140. Patient attributes it to  running in this morning for appt. Will continue to monitor and consider sending home with BP cuff for monitoring if BP elevated at follow up. Currently asymptomatic.   - continue to monitor at follow up.      4. Routine HIV care.          Prophylaxis:                         a. None indicated given CD4 count.          Routine Infectious disease screening.                          a. Hepatitis B immune.                          b. Hepatitis C negative.                          c. Toxo IgG negative.                          d. CMV IgG positive.                          e. QuantiFERON Gold negative in 07/2009 with no risk factors for acquisition since.                          f. Declines STI screening today as he is in a monogamous relationship.         Vaccination status.                          a. Hepatitis A vaccine series complete, Hep A immune                         b. Hepatitis B immune.                          c. Pneumovax given in 2009, booster 2015.                          d. Prevnar 07/2014.                          e. Will encourage yearly influenza vaccination.                          f. Menactra series complete.         Cardiovascular and renal risk factors.                          a. Creatinine and fasting glucose have been within normal limits.                         c. Lipids checked 3/2017: , total cholesterol 182                          d. BP normal today (136/83)                         e. He is a nonsmoker.                          f. He is a healthy weight and exercises regularly.           Cancer screening.                          a. Anal pap smear completed on 5/25/17 (repeat 5/2018). Will follow up with patient regarding the results.       Repeat LFT's in 3 months (lab visit only) and RTC in 1 year for routine follow up.       Patient seen and discussed with attending physician Dr. Niecy Shelton MD  PGY-2 Internal Medicine  P: [phone]    Attending  Attestation:  I evaluated Mr. Rashid with resident Dr. Shelton.  I have reviewed today's labs, vitals and imaging results and have edited this note to reflect our joint findings, assessment and recommendations.    Lisa Pemberton MD  [phone]

## 2017-05-25 NOTE — LETTER
Date:June 5, 2017      Patient was self referred, no letter generated. Do not send.        [facility] Health Information

## 2017-05-25 NOTE — MR AVS SNAPSHOT
"              After Visit Summary   5/25/2017    Shan Rashid    MRN: [id number]           Patient Information     Date Of Birth          1975        Visit Information        Provider Department      5/25/2017 2:30 PM Lisa Pemberton MD [facility] Infectious Diseases        Today's Diagnoses     Asymptomatic human immunodeficiency virus (HIV) infection status (H)    -  1       Follow-ups after your visit        Follow-up notes from your care team     Return in about 1 year (around 5/25/2018) for Physical Exam, Lab Work, Routine Visit.      Who to contact     If you have questions or need follow up information about today's clinic visit or your schedule please contact [facility] AND INFECTIOUS DISEASES directly at [phone].  Normal or non-critical lab and imaging results will be communicated to you by [email or web address], letter or phone within 4 business days after the clinic has received the results. If you do not hear from us within 7 days, please contact the clinic through [email or web address] or phone. If you have a critical or abnormal lab result, we will notify you by phone as soon as possible.  Submit refill requests through [employer] or call your pharmacy and they will forward the refill request to us. Please allow 3 business days for your refill to be completed.          Additional Information About Your Visit        [email or web address] Information     [employer] lets you send messages to your doctor, view your test results, renew your prescriptions, schedule appointments and more. To sign up, go to [email or web address] . Click on \"Log in\" on the left side of the screen, which will take you to the Welcome page. Then click on \"Sign up Now\" on the right side of the page.     You will be asked to enter the access code listed below, as well as some personal information. Please follow the directions to create your username and password.     Your access code is: [email or web address]  Expires: 6/21/2017  6:30 " "AM     Your access code will  in 90 days. If you need help or a new code, please call your [facility] clinic or [phone].        Care EveryWhere ID     This is your Care EveryWhere ID. This could be used by other organizations to access your [facility] medical records  [id number]        Your Vitals Were     Pulse Temperature Height BMI (Body Mass Index)          78 97.6  F (36.4  C) (Oral) 1.753 m (5' 9\") 23.47 kg/m2         Blood Pressure from Last 3 Encounters:   17 144/89   17 126/83   16 (!) 124/97    Weight from Last 3 Encounters:   17 72.1 kg (158 lb 14.4 oz)   17 71.6 kg (157 lb 12.8 oz)   16 70.6 kg (155 lb 9.6 oz)              We Performed the Following     Anal Pap Smear        Primary Care Provider    None Doctor, MD       No address on file        Thank you!     Thank you for choosing [facility] AND INFECTIOUS DISEASES  for your care. Our goal is always to provide you with excellent care. Hearing back from our patients is one way we can continue to improve our services. Please take a few minutes to complete the written survey that you may receive in the mail after your visit with us. Thank you!             Your Updated Medication List - Protect others around you: Learn how to safely use, store and throw away your medicines at www.disposemymeds.org.          This list is accurate as of: 17 11:59 PM.  Always use your most recent med list.                   Brand Name Dispense Instructions for use    DAILY MULTIVITAMIN PO      Take  by mouth daily.       [id number]-Gpmivohl-TenofAF [phone] MG Tabs per tablet    GENVOYA    30 tablet    Take 1 tablet by mouth daily (with breakfast)         "

## 2017-05-30 LAB — COPATH REPORT: NORMAL

## 2017-06-02 ENCOUNTER — TELEPHONE (OUTPATIENT)
Dept: INFECTIOUS DISEASES | Facility: CLINIC | Age: 42
End: 2017-06-02

## 2017-06-02 NOTE — TELEPHONE ENCOUNTER
I left a msg with Shan letting him know that his anal pap was normal.  He will come in for LFTs in 3 months and will follow up in clinic in a year unless worsening lab abnormalities or new issues.     Lisa Pemberton MD  [phone]

## 2017-08-24 DIAGNOSIS — Z21 ASYMPTOMATIC HUMAN IMMUNODEFICIENCY VIRUS (HIV) INFECTION STATUS (H): ICD-10-CM

## 2017-08-24 LAB
ALBUMIN SERPL-MCNC: 4.1 G/DL (ref 3.4–5)
ALP SERPL-CCNC: 75 U/L (ref 40–150)
ALT SERPL W P-5'-P-CCNC: 62 U/L (ref 0–70)
AST SERPL W P-5'-P-CCNC: 28 U/L (ref 0–45)
BILIRUB DIRECT SERPL-MCNC: <0.1 MG/DL (ref 0–0.2)
BILIRUB SERPL-MCNC: 0.3 MG/DL (ref 0.2–1.3)
PROT SERPL-MCNC: 7.7 G/DL (ref 6.8–8.8)

## 2017-11-02 ENCOUNTER — OFFICE VISIT (OUTPATIENT)
Dept: INFECTIOUS DISEASES | Facility: CLINIC | Age: 42
End: 2017-11-02
Attending: COLON & RECTAL SURGERY
Payer: COMMERCIAL

## 2017-11-02 VITALS
BODY MASS INDEX: 23.55 KG/M2 | DIASTOLIC BLOOD PRESSURE: 90 MMHG | SYSTOLIC BLOOD PRESSURE: 131 MMHG | HEIGHT: 69 IN | WEIGHT: 159 LBS | TEMPERATURE: 98.4 F | HEART RATE: 79 BPM

## 2017-11-02 DIAGNOSIS — J02.9 ACUTE PHARYNGITIS, UNSPECIFIED ETIOLOGY: Primary | ICD-10-CM

## 2017-11-02 LAB
BACTERIA SPEC CULT: NORMAL
DEPRECATED S PYO AG THROAT QL EIA: NORMAL
DEPRECATED S PYO AG THROAT QL EIA: NORMAL
SPECIMEN SOURCE: NORMAL
SPECIMEN SOURCE: NORMAL

## 2017-11-02 PROCEDURE — 87077 CULTURE AEROBIC IDENTIFY: CPT | Performed by: COLON & RECTAL SURGERY

## 2017-11-02 PROCEDURE — 87880 STREP A ASSAY W/OPTIC: CPT | Performed by: COLON & RECTAL SURGERY

## 2017-11-02 PROCEDURE — 87591 N.GONORRHOEAE DNA AMP PROB: CPT | Performed by: COLON & RECTAL SURGERY

## 2017-11-02 PROCEDURE — 87070 CULTURE OTHR SPECIMN AEROBIC: CPT | Performed by: COLON & RECTAL SURGERY

## 2017-11-02 PROCEDURE — 99212 OFFICE O/P EST SF 10 MIN: CPT | Mod: ZF

## 2017-11-02 ASSESSMENT — PAIN SCALES - GENERAL: PAINLEVEL: NO PAIN (0)

## 2017-11-02 NOTE — LETTER
"11/2/2017       RE: Shan Rashid  [address]  [facility] [address]     Dear Colleague,    Thank you for referring your patient, Shan Rashid, to the [facility] AND INFECTIOUS DISEASES at [facility]. Please see a copy of my visit note below.    REASON FOR VISIT: Assessment for peritonsillar exudates and sore throat.      HISTORY OF PRESENT ILLNESS:  Mr. Rashid is a very pleasant 42 year-old gentleman with HIV, doing well on therapy, presenting to clinic for evaluation of bilateral tonsillar exudates and mild sore throat. Symptoms began just yesterday.  He noticed sore throat, tan when swallowing, white spots all over his tonsils and enlarged LN in his neck.  He denies dsyphagia, issues breathing, fevers, chills, myalgias.  No recent sick contacts,  unprotected oral sex, travel.  He reports ongoing excellent adherence to his ARVs and has otherwise been feeling well.       PAST MEDICAL HISTORY:   1.  HIV diagnosed in 07/2009.  Started on Atripla 08/2009.  CD4 stanislav 234 (11%) in 07/2009. Switched to Genvoya 4/6/2017.  2.  Recurrent skin abscesses, last in 2011.   3.  History of chlamydia treated in 2011.   4.  History of depression, now resolved.   5.  GERD.      ALLERGIES:  No known drug allergies.      CURRENT MEDICATIONS:   1.  Atripla.   2.  Multivitamin.      REVIEW OF SYSTEMS:  A 12-point review of systems was obtained, pertinent positives and negatives as above.      PHYSICAL EXAMINATION:   VITAL SIGNS:  /90  Pulse 79  Temp 98.4  F (36.9  C) (Oral)  Ht 1.753 m (5' 9\")  Wt 72.1 kg (159 lb)  BMI 23.48 kg/m2]   GENERAL:  He is a healthy-appearing gentleman in no acute distress.   HEENT:  Pupils equally round and reactive to light.  Extraocular muscles intact.  Oropharynx moist. Bilateral patchy tonsillar exudates with mild pharyngeal erythema.    NECK:  Supple and nontender, + anterior cervical lymphadenopathy.   LUNGS:  " Breathing comfortably. Clear to auscultation bilaterally.   HEART:  Regular rate and rhythm with no murmurs, rubs or gallops.   ABDOMEN:  Soft, nontender, nondistended with positive bowel sounds and no hepatosplenomegaly or masses.   EXTREMITIES:  Warm and without edema.   SKIN:  No acute rashes or lesions.   NEUROLOGIC:  Cranial nerves grossly intact.  Gait unremarkable.      LABORATORY DATA:    Full lab panel most recently obtained 05/2017.  Comprehensive metabolic panel remarkable for an ALT of 78 (normalized in August).  CBC normal. HIV viral load less than 20.  CD4 count 990 (March, 2017).      ASSESSMENT AND PLAN:  Mr. Rashid is a very pleasant 42-year-old gentleman with HIV, presenting to clinic for evaluation of tonsillar exudates.    1.  Tonsillar exudates:  Started yesterday.  Associated with tender cervical adenopathy.  No documented fevers.  No airway issues or signs of more invasive process (ie, peritonsillar abscess). Denies sick contacts.  Based on Centor criteria, there is the possibility of GAS pharyngitis so will check rapid strep and culture; will treat only if positive.  Will also check pharyngeal GC.  - Sending rapid strep, throat cx, GC PCR.   - Recommended ibuprofen, supportive measures.    - Will not give empiric abx.   - Will contact him once micro results are available.     2.  HIV. Doing very well on Genvoya.  No issues with adherence, most recent VL was <20 and CD4 was >900.   - Continue Genvoya.   - Return visit in 5/2018 unless issues arise prior.       3.  Routine HIV care.        Prophylaxis:            a.  None indicated given CD4 count.          Routine Infectious disease screening.             a.  Hepatitis B immune.             b.  Hepatitis C negative.             c.  Toxo IgG negative.             d.  CMV IgG positive.             e.  QuantiFERON Gold negative in 07/2009 with no risk factors for acquisition since.             f.   Checking pharyngeal GC today; declines other  STI screening today as he is in a monogamous relationship.         Vaccination status.             a.  Hepatitis A vaccine series complete; now immune.            b.  Hepatitis B immune.             c.  Pneumovax given in 2009, booster 2015.             d.  Prevnar 07/2014.               e.  Will continue yearly influenza vaccination.             f.   Menactra series complete.       Cardiovascular and renal risk factors.               a.  Creatinine and fasting glucose have been within normal limits.             b.  UA at follow up.              c.  Lipids at follow up.               d.  Diastolic BP has been borderline high but systolic BP is okay.  Encouraged lifestyle approaches. Will reassess at follow up.             e.  He is a nonsmoker.               f.   He is a healthy weight and exercises regularly.   7.  Cancer screening.            a.  Anal Pap negative 5/2017, will repeat at follow up.        Mr. Rashid will contact me if symptoms do not improve or if any new issues arise. Otherwise, will see him back in clinic in May, 2017.       Lisa Pemberton MD  [phone]        Again, thank you for allowing me to participate in the care of your patient.      Sincerely,    Lisa Pemberton MD

## 2017-11-02 NOTE — LETTER
Date:November 8, 2017      Patient was self referred, no letter generated. Do not send.        [facility] Information

## 2017-11-02 NOTE — NURSING NOTE
"Chief Complaint   Patient presents with     RECHECK     follow up with sore throat, tzimmer cma       Initial /90  Pulse 79  Temp 98.4  F (36.9  C) (Oral)  Ht 1.753 m (5' 9\")  Wt 72.1 kg (159 lb)  BMI 23.48 kg/m2 Estimated body mass index is 23.48 kg/(m^2) as calculated from the following:    Height as of this encounter: 1.753 m (5' 9\").    Weight as of this encounter: 72.1 kg (159 lb).  Medication Reconciliation: complete    "

## 2017-11-02 NOTE — MR AVS SNAPSHOT
"              After Visit Summary   11/2/2017    Shan Rashid    MRN: [id number]           Patient Information     Date Of Birth          1975        Visit Information        Provider Department      11/2/2017 1:00 PM Lisa Pemberton MD [facility] Infectious Diseases        Today's Diagnoses     Acute pharyngitis, unspecified etiology    -  1       Follow-ups after your visit        Follow-up notes from your care team     Return in about 6 months (around 5/1/2018).      Who to contact     If you have questions or need follow up information about today's clinic visit or your schedule please contact [facility] AND INFECTIOUS DISEASES directly at [phone].  Normal or non-critical lab and imaging results will be communicated to you by MyChart, letter or phone within 4 business days after the clinic has received the results. If you do not hear from us within 7 days, please contact the clinic through [facility] [place] [facility] or phone. If you have a critical or abnormal lab result, we will notify you by phone as soon as possible.  Submit refill requests through [employer]kL[employer] or call your pharmacy and they will forward the refill request to us. Please allow 3 business days for your refill to be completed.          Additional Information About Your Visit        MyChart Information     [employer]kLodge gives you secure access to your electronic health record. If you see a primary care provider, you can also send messages to your care team and make appointments. If you have questions, please call your primary care clinic.  If you do not have a primary care provider, please call [phone] and they will assist you.        Care EveryWhere ID     This is your Care EveryWhere ID. This could be used by other organizations to access your [place] medical records  [id number]        Your Vitals Were     Pulse Temperature Height BMI (Body Mass Index)          79 98.4  F (36.9  C) (Oral) 1.753 m (5' 9\") 23.48 " kg/m2         Blood Pressure from Last 3 Encounters:   11/02/17 131/90   05/25/17 144/89   04/06/17 126/83    Weight from Last 3 Encounters:   11/02/17 72.1 kg (159 lb)   05/25/17 72.1 kg (158 lb 14.4 oz)   04/06/17 71.6 kg (157 lb 12.8 oz)              We Performed the Following     Beta strep group A culture     Neisseria gonorrhoeae PCR     Rapid strep screen     Throat Culture Aerobic Bacterial        Primary Care Provider    Physician No Ref-Primary       NO REF-PRIMARY PHYSICIAN        Equal Access to Services     [facility] : Hadii allyn serrano Sochris, waaxda luqadaha, qaybta kaalmakarely frye, iram mead . So [facility] [phone].    ATENCIÓN: Si habla español, tiene a randall disposición servicios gratuitos de asistencia lingüística. Llame al [phone].    We comply with applicable federal civil rights laws and [place] laws. We do not discriminate on the basis of race, color, national origin, age, disability, sex, sexual orientation, or gender identity.            Thank you!     Thank you for choosing [facility] AND INFECTIOUS DISEASES  for your care. Our goal is always to provide you with excellent care. Hearing back from our patients is one way we can continue to improve our services. Please take a few minutes to complete the written survey that you may receive in the mail after your visit with us. Thank you!             Your Updated Medication List - Protect others around you: Learn how to safely use, store and throw away your medicines at www.disposemymeds.org.          This list is accurate as of: 11/2/17 11:59 PM.  Always use your most recent med list.                   Brand Name Dispense Instructions for use Diagnosis    DAILY MULTIVITAMIN PO      Take  by mouth daily.        Pjmofhy-Aqjrm-Hpwmlgfs-TenofAF [phone] MG Tabs per tablet    GENVOYA    30 tablet    Take 1 tablet by mouth daily (with breakfast)    Human immunodeficiency virus (HIV)  disease

## 2017-11-03 LAB
N GONORRHOEA DNA SPEC QL NAA+PROBE: NEGATIVE
SPECIMEN SOURCE: NORMAL

## 2017-11-03 NOTE — PROGRESS NOTES
"REASON FOR VISIT: Assessment for peritonsillar exudates and sore throat.      HISTORY OF PRESENT ILLNESS:  Mr. Rashid is a very pleasant 42 year-old gentleman with HIV, doing well on therapy, presenting to clinic for evaluation of bilateral tonsillar exudates and mild sore throat. Symptoms began just yesterday.  He noticed sore throat, tan when swallowing, white spots all over his tonsils and enlarged LN in his neck.  He denies dsyphagia, issues breathing, fevers, chills, myalgias.  No recent sick contacts,  unprotected oral sex, travel.  He reports ongoing excellent adherence to his ARVs and has otherwise been feeling well.       PAST MEDICAL HISTORY:   1.  HIV diagnosed in 07/2009.  Started on Atripla 08/2009.  CD4 stanislav 234 (11%) in 07/2009. Switched to Genvoya 4/6/2017.  2.  Recurrent skin abscesses, last in 2011.   3.  History of chlamydia treated in 2011.   4.  History of depression, now resolved.   5.  GERD.      ALLERGIES:  No known drug allergies.      CURRENT MEDICATIONS:   1.  Atripla.   2.  Multivitamin.      REVIEW OF SYSTEMS:  A 12-point review of systems was obtained, pertinent positives and negatives as above.      PHYSICAL EXAMINATION:   VITAL SIGNS:  /90  Pulse 79  Temp 98.4  F (36.9  C) (Oral)  Ht 1.753 m (5' 9\")  Wt 72.1 kg (159 lb)  BMI 23.48 kg/m2]   GENERAL:  He is a healthy-appearing gentleman in no acute distress.   HEENT:  Pupils equally round and reactive to light.  Extraocular muscles intact.  Oropharynx moist. Bilateral patchy tonsillar exudates with mild pharyngeal erythema.    NECK:  Supple and nontender, + anterior cervical lymphadenopathy.   LUNGS:  Breathing comfortably. Clear to auscultation bilaterally.   HEART:  Regular rate and rhythm with no murmurs, rubs or gallops.   ABDOMEN:  Soft, nontender, nondistended with positive bowel sounds and no hepatosplenomegaly or masses.   EXTREMITIES:  Warm and without edema.   SKIN:  No acute rashes or lesions.   NEUROLOGIC:  " Cranial nerves grossly intact.  Gait unremarkable.      LABORATORY DATA:    Full lab panel most recently obtained 05/2017.  Comprehensive metabolic panel remarkable for an ALT of 78 (normalized in August).  CBC normal. HIV viral load less than 20.  CD4 count 990 (March, 2017).      ASSESSMENT AND PLAN:  Mr. Rashid is a very pleasant 42-year-old gentleman with HIV, presenting to clinic for evaluation of tonsillar exudates.    1.  Tonsillar exudates:  Started yesterday.  Associated with tender cervical adenopathy.  No documented fevers.  No airway issues or signs of more invasive process (ie, peritonsillar abscess). Denies sick contacts.  Based on Centor criteria, there is the possibility of GAS pharyngitis so will check rapid strep and culture; will treat only if positive.  Will also check pharyngeal GC.  - Sending rapid strep, throat cx, GC PCR.   - Recommended ibuprofen, supportive measures.    - Will not give empiric abx.   - Will contact him once micro results are available.     2.  HIV. Doing very well on Genvoya.  No issues with adherence, most recent VL was <20 and CD4 was >900.   - Continue Genvoya.   - Return visit in 5/2018 unless issues arise prior.       3.  Routine HIV care.        Prophylaxis:            a.  None indicated given CD4 count.          Routine Infectious disease screening.             a.  Hepatitis B immune.             b.  Hepatitis C negative.             c.  Toxo IgG negative.             d.  CMV IgG positive.             e.  QuantiFERON Gold negative in 07/2009 with no risk factors for acquisition since.             f.   Checking pharyngeal GC today; declines other STI screening today as he is in a monogamous relationship.         Vaccination status.             a.  Hepatitis A vaccine series complete; now immune.            b.  Hepatitis B immune.             c.  Pneumovax given in 2009, booster 2015.             d.  Prevnar 07/2014.               e.  Will continue yearly influenza  vaccination.             f.   Menactra series complete.       Cardiovascular and renal risk factors.               a.  Creatinine and fasting glucose have been within normal limits.             b.  UA at follow up.              c.  Lipids at follow up.               d.  Diastolic BP has been borderline high but systolic BP is okay.  Encouraged lifestyle approaches. Will reassess at follow up.             e.  He is a nonsmoker.               f.   He is a healthy weight and exercises regularly.   7.  Cancer screening.            a.  Anal Pap negative 5/2017, will repeat at follow up.        Mr. Rashid will contact me if symptoms do not improve or if any new issues arise. Otherwise, will see him back in clinic in May, 2017.       Lisa Pemberton MD  [phone]

## 2017-11-05 LAB
BACTERIA SPEC CULT: ABNORMAL
BACTERIA SPEC CULT: ABNORMAL
SPECIMEN SOURCE: ABNORMAL

## 2017-12-22 ENCOUNTER — OFFICE VISIT (OUTPATIENT)
Dept: URGENT CARE | Facility: URGENT CARE | Age: 42
End: 2017-12-22
Payer: COMMERCIAL

## 2017-12-22 VITALS
SYSTOLIC BLOOD PRESSURE: 122 MMHG | TEMPERATURE: 97.8 F | DIASTOLIC BLOOD PRESSURE: 76 MMHG | HEIGHT: 69 IN | HEART RATE: 91 BPM | WEIGHT: 165 LBS | OXYGEN SATURATION: 98 % | BODY MASS INDEX: 24.44 KG/M2

## 2017-12-22 DIAGNOSIS — N34.2 URETHRITIS: Primary | ICD-10-CM

## 2017-12-22 DIAGNOSIS — Z11.3 SCREENING EXAMINATION FOR VENEREAL DISEASE: ICD-10-CM

## 2017-12-22 PROCEDURE — 87591 N.GONORRHOEAE DNA AMP PROB: CPT | Performed by: PHYSICIAN ASSISTANT

## 2017-12-22 PROCEDURE — 87491 CHLMYD TRACH DNA AMP PROBE: CPT | Performed by: PHYSICIAN ASSISTANT

## 2017-12-22 PROCEDURE — 99203 OFFICE O/P NEW LOW 30 MIN: CPT | Mod: 25 | Performed by: FAMILY MEDICINE

## 2017-12-22 PROCEDURE — 96372 THER/PROPH/DIAG INJ SC/IM: CPT | Performed by: FAMILY MEDICINE

## 2017-12-22 RX ORDER — AZITHROMYCIN 500 MG/1
1000 TABLET, FILM COATED ORAL ONCE
Qty: 2 TABLET | Refills: 0 | Status: SHIPPED | OUTPATIENT
Start: 2017-12-22 | End: 2017-12-22

## 2017-12-22 RX ORDER — CEFTRIAXONE SODIUM 250 MG/1
250 INJECTION, POWDER, FOR SOLUTION INTRAMUSCULAR; INTRAVENOUS ONCE
Qty: 2.5 ML | Refills: 0 | OUTPATIENT
Start: 2017-12-22 | End: 2017-12-22

## 2017-12-22 NOTE — MR AVS SNAPSHOT
"              After Visit Summary   12/22/2017    Shan Rashid    MRN: [id number]           Patient Information     Date Of Birth          1975        Visit Information        Provider Department      12/22/2017 1:55 PM Jareth Joaquin,  [facility]        Today's Diagnoses     Urethritis    -  1    Screening examination for venereal disease           Follow-ups after your visit        Who to contact     If you have questions or need follow up information about today's clinic visit or your schedule please contact [facility] directly at [phone].  Normal or non-critical lab and imaging results will be communicated to you by Mimetashart, letter or phone within 4 business days after the clinic has received the results. If you do not hear from us within 7 days, please contact the clinic through Mimetashart or phone. If you have a critical or abnormal lab result, we will notify you by phone as soon as possible.  Submit refill requests through [employer] or call your pharmacy and they will forward the refill request to us. Please allow 3 business days for your refill to be completed.          Additional Information About Your Visit        MyChart Information     [employer] gives you secure access to your electronic health record. If you see a primary care provider, you can also send messages to your care team and make appointments. If you have questions, please call your primary care clinic.  If you do not have a primary care provider, please call [phone] and they will assist you.        Care EveryWhere ID     This is your Care EveryWhere ID. This could be used by other organizations to access your [place] medical records  [id number]        Your Vitals Were     Pulse Temperature Height Pulse Oximetry BMI (Body Mass Index)       91 97.8  F (36.6  C) (Oral) 5' 9\" (1.753 m) 98% 24.37 kg/m2        Blood Pressure from Last 3 Encounters:   12/22/17 122/76 "   11/02/17 131/90   05/25/17 144/89    Weight from Last 3 Encounters:   12/22/17 165 lb (74.8 kg)   11/02/17 159 lb (72.1 kg)   05/25/17 158 lb 14.4 oz (72.1 kg)              We Performed the Following     CHLAMYDIA TRACHOMATIS PCR     NEISSERIA GONORRHOEA PCR          Today's Medication Changes          These changes are accurate as of: 12/22/17  2:16 PM.  If you have any questions, ask your nurse or doctor.               Start taking these medicines.        Dose/Directions    azithromycin 500 MG tablet   Commonly known as:  ZITHROMAX   Used for:  Urethritis   Started by:  Jareth Joaquin DO        Dose:  1000 mg   Take 2 tablets (1,000 mg) by mouth once for 1 dose   Quantity:  2 tablet   Refills:  0       cefTRIAXone 250 MG injection   Commonly known as:  ROCEPHIN   Used for:  Urethritis   Started by:  Jareth Joaquin DO        Dose:  250 mg   Inject 250 mg into the muscle once for 1 dose   Quantity:  2.5 mL   Refills:  0            Where to get your medicines      These medications were sent to [address] [address]     Phone:  [phone]     azithromycin 500 MG tablet         Some of these will need a paper prescription and others can be bought over the counter.  Ask your nurse if you have questions.     You don't need a prescription for these medications     cefTRIAXone 250 MG injection                Primary Care Provider Fax #    Physician No Ref-Primary [phone]       No address on file        Equal Access to Services     STEPHEN KUMAR AH: Malcolm ortizo Sochris, waaxda luqadaha, qaybta kaalmada adejacksonda, iram salomon. So [facility] [phone].    ATENCIÓN: Si habla español, tiene a randall disposición servicios gratuitos de asistencia lingüística. Llame al [phone].    We comply with applicable federal civil rights laws and [place] laws. We do not discriminate on the basis of race,  color, national origin, age, disability, sex, sexual orientation, or gender identity.            Thank you!     Thank you for choosing [facility]  for your care. Our goal is always to provide you with excellent care. Hearing back from our patients is one way we can continue to improve our services. Please take a few minutes to complete the written survey that you may receive in the mail after your visit with us. Thank you!             Your Updated Medication List - Protect others around you: Learn how to safely use, store and throw away your medicines at www.disposemymeds.org.          This list is accurate as of: 12/22/17  2:16 PM.  Always use your most recent med list.                   Brand Name Dispense Instructions for use Diagnosis    azithromycin 500 MG tablet    ZITHROMAX    2 tablet    Take 2 tablets (1,000 mg) by mouth once for 1 dose    Urethritis       cefTRIAXone 250 MG injection    ROCEPHIN    2.5 mL    Inject 250 mg into the muscle once for 1 dose    Urethritis       DAILY MULTIVITAMIN PO      Take  by mouth daily.        Xjeffyk-Shibe-Teyjifjl-TenofAF [id number] MG Tabs per tablet    GENVOYA    30 tablet    Take 1 tablet by mouth daily (with breakfast)    Human immunodeficiency virus (HIV) disease

## 2017-12-24 LAB
C TRACH DNA SPEC QL NAA+PROBE: NEGATIVE
N GONORRHOEA DNA SPEC QL NAA+PROBE: NEGATIVE
SPECIMEN SOURCE: NORMAL
SPECIMEN SOURCE: NORMAL

## 2018-01-04 NOTE — PROGRESS NOTES
"SUBJECTIVE: Shan Rashid is a 42 year old male who  presents today for a possible UTI.   Symptoms of dysuria and frequency have been going on forday(s).    Hematuria no.  sudden onsetand moderate.    There is no history of fever, chills, nausea or vomiting.   This patient does have a history of urinary tract infections.   Patient denies long duration and flank pain    Past Medical History:   Diagnosis Date     Depression      Human immunodeficiency virus (HIV) disease Diagnosed July, 2009.    Started on Atripla 8/13/09.       Perirectal abscess October, 2010.    Responded to therapy with bactrim.     Allergies   Allergen Reactions     Nkda [No Known Drug Allergies]      Social History   Substance Use Topics     Smoking status: Former Smoker     Years: 3.00     Types: Cigarettes     Quit date: 9/22/2000     Smokeless tobacco: Never Used     Alcohol use Yes      Comment: 2 drinks week, usually beer       ROS: CONSTITUTIONAL:NEGATIVE for fever, chills, change in weight    OBJECTIVE:  /76  Pulse 91  Temp 97.8  F (36.6  C) (Oral)  Ht 5' 9\" (1.753 m)  Wt 165 lb (74.8 kg)  SpO2 98%  BMI 24.37 kg/m2    No Flank/abd pain      ICD-10-CM    1. Urethritis N34.2 cefTRIAXone (ROCEPHIN) 250 MG injection     azithromycin (ZITHROMAX) 500 MG tablet     CEFTRIAXONE NA INJ /250MG     INJECTION INTRAMUSCULAR OR SUB-Q   2. Screening examination for venereal disease Z11.3 NEISSERIA GONORRHOEA PCR     CHLAMYDIA TRACHOMATIS PCR       Drink plenty of fluids.  Prevention and treatment of UTI's discussed.Signs and symptoms of pyelonephritis mentioned.  Follow up with primary care physician if not improving    "

## 2018-01-30 ENCOUNTER — TELEPHONE (OUTPATIENT)
Dept: PHARMACY | Facility: CLINIC | Age: 43
End: 2018-01-30

## 2018-01-31 NOTE — TELEPHONE ENCOUNTER
Attempted to contact the patient to for refill reminder call,  left message on voicemail    LM ABOUT REFILL.  WHEN PATIENT CALLS BACK, ASK IF HE HAS NEW INSURANCE.  THE RX DATED 1/8/18 WAS NEVER SENT OUT DUE TO HIGH COPAY $1876.67    Follow up date 1/31/18    [place]

## 2018-02-16 ENCOUNTER — TELEPHONE (OUTPATIENT)
Dept: PHARMACY | Facility: CLINIC | Age: 43
End: 2018-02-16

## 2018-02-16 NOTE — TELEPHONE ENCOUNTER
Called patient for refill reminder.    Patient will   1 prescriptions on 02/16/18    Follow up: 03/13/18    Galina Babin, [facility]  [phone]

## 2018-03-14 ENCOUNTER — TELEPHONE (OUTPATIENT)
Dept: PHARMACY | Facility: CLINIC | Age: 43
End: 2018-03-14

## 2018-03-14 NOTE — TELEPHONE ENCOUNTER
Attempted to contact the patient to for refill reminder call,  left message on voicemail.    Follow up: 03/16/18    Galina Babin [facility]  [phone]

## 2018-03-29 ENCOUNTER — MYC MEDICAL ADVICE (OUTPATIENT)
Dept: INFECTIOUS DISEASES | Facility: CLINIC | Age: 43
End: 2018-03-29

## 2018-03-29 ENCOUNTER — OFFICE VISIT (OUTPATIENT)
Dept: INFECTIOUS DISEASES | Facility: CLINIC | Age: 43
End: 2018-03-29
Attending: COLON & RECTAL SURGERY
Payer: COMMERCIAL

## 2018-03-29 ENCOUNTER — RADIANT APPOINTMENT (OUTPATIENT)
Dept: CT IMAGING | Facility: CLINIC | Age: 43
End: 2018-03-29
Attending: COLON & RECTAL SURGERY
Payer: COMMERCIAL

## 2018-03-29 VITALS
OXYGEN SATURATION: 99 % | DIASTOLIC BLOOD PRESSURE: 87 MMHG | TEMPERATURE: 98.4 F | WEIGHT: 165.5 LBS | SYSTOLIC BLOOD PRESSURE: 130 MMHG | HEART RATE: 71 BPM | BODY MASS INDEX: 24.51 KG/M2 | HEIGHT: 69 IN

## 2018-03-29 DIAGNOSIS — M54.2 NECK PAIN: ICD-10-CM

## 2018-03-29 DIAGNOSIS — M54.2 NECK PAIN: Primary | ICD-10-CM

## 2018-03-29 DIAGNOSIS — B20 HUMAN IMMUNODEFICIENCY VIRUS (HIV) DISEASE (H): ICD-10-CM

## 2018-03-29 LAB
ALBUMIN SERPL-MCNC: 4.3 G/DL (ref 3.4–5)
ALP SERPL-CCNC: 76 U/L (ref 40–150)
ALT SERPL W P-5'-P-CCNC: 44 U/L (ref 0–70)
ANION GAP SERPL CALCULATED.3IONS-SCNC: 8 MMOL/L (ref 3–14)
AST SERPL W P-5'-P-CCNC: 22 U/L (ref 0–45)
BASOPHILS # BLD AUTO: 0 10E9/L (ref 0–0.2)
BASOPHILS NFR BLD AUTO: 0.2 %
BILIRUB SERPL-MCNC: 0.3 MG/DL (ref 0.2–1.3)
BUN SERPL-MCNC: 11 MG/DL (ref 7–30)
CALCIUM SERPL-MCNC: 9.3 MG/DL (ref 8.5–10.1)
CHLORIDE SERPL-SCNC: 102 MMOL/L (ref 94–109)
CO2 SERPL-SCNC: 28 MMOL/L (ref 20–32)
CREAT SERPL-MCNC: 0.98 MG/DL (ref 0.66–1.25)
CRP SERPL-MCNC: 3.2 MG/L (ref 0–8)
DIFFERENTIAL METHOD BLD: NORMAL
EOSINOPHIL # BLD AUTO: 0.1 10E9/L (ref 0–0.7)
EOSINOPHIL NFR BLD AUTO: 1.3 %
ERYTHROCYTE [DISTWIDTH] IN BLOOD BY AUTOMATED COUNT: 13.2 % (ref 10–15)
GFR SERPL CREATININE-BSD FRML MDRD: 84 ML/MIN/1.7M2
GLUCOSE SERPL-MCNC: 91 MG/DL (ref 70–99)
HCT VFR BLD AUTO: 45 % (ref 40–53)
HGB BLD-MCNC: 15.3 G/DL (ref 13.3–17.7)
IMM GRANULOCYTES # BLD: 0 10E9/L (ref 0–0.4)
IMM GRANULOCYTES NFR BLD: 0.2 %
LYMPHOCYTES # BLD AUTO: 2.1 10E9/L (ref 0.8–5.3)
LYMPHOCYTES NFR BLD AUTO: 24 %
MCH RBC QN AUTO: 28.5 PG (ref 26.5–33)
MCHC RBC AUTO-ENTMCNC: 34 G/DL (ref 31.5–36.5)
MCV RBC AUTO: 84 FL (ref 78–100)
MONOCYTES # BLD AUTO: 0.6 10E9/L (ref 0–1.3)
MONOCYTES NFR BLD AUTO: 6.5 %
NEUTROPHILS # BLD AUTO: 5.9 10E9/L (ref 1.6–8.3)
NEUTROPHILS NFR BLD AUTO: 67.8 %
NRBC # BLD AUTO: 0 10*3/UL
NRBC BLD AUTO-RTO: 0 /100
PLATELET # BLD AUTO: 249 10E9/L (ref 150–450)
POTASSIUM SERPL-SCNC: 3.7 MMOL/L (ref 3.4–5.3)
PROT SERPL-MCNC: 8.2 G/DL (ref 6.8–8.8)
RBC # BLD AUTO: 5.37 10E12/L (ref 4.4–5.9)
SODIUM SERPL-SCNC: 138 MMOL/L (ref 133–144)
WBC # BLD AUTO: 8.7 10E9/L (ref 4–11)

## 2018-03-29 PROCEDURE — G0463 HOSPITAL OUTPT CLINIC VISIT: HCPCS | Mod: ZF

## 2018-03-29 PROCEDURE — 80053 COMPREHEN METABOLIC PANEL: CPT | Performed by: COLON & RECTAL SURGERY

## 2018-03-29 PROCEDURE — 86140 C-REACTIVE PROTEIN: CPT | Performed by: COLON & RECTAL SURGERY

## 2018-03-29 PROCEDURE — 86359 T CELLS TOTAL COUNT: CPT | Performed by: COLON & RECTAL SURGERY

## 2018-03-29 PROCEDURE — 85025 COMPLETE CBC W/AUTO DIFF WBC: CPT | Performed by: COLON & RECTAL SURGERY

## 2018-03-29 PROCEDURE — 87536 HIV-1 QUANT&REVRSE TRNSCRPJ: CPT | Performed by: COLON & RECTAL SURGERY

## 2018-03-29 PROCEDURE — 86360 T CELL ABSOLUTE COUNT/RATIO: CPT | Performed by: COLON & RECTAL SURGERY

## 2018-03-29 RX ORDER — IOPAMIDOL 755 MG/ML
100 INJECTION, SOLUTION INTRAVASCULAR ONCE
Status: COMPLETED | OUTPATIENT
Start: 2018-03-29 | End: 2018-03-29

## 2018-03-29 RX ADMIN — IOPAMIDOL 100 ML: 755 INJECTION, SOLUTION INTRAVASCULAR at 16:52

## 2018-03-29 ASSESSMENT — PAIN SCALES - GENERAL: PAINLEVEL: SEVERE PAIN (6)

## 2018-03-29 NOTE — DISCHARGE INSTRUCTIONS

## 2018-03-29 NOTE — LETTER
"3/29/2018      RE: Shan Rashid  [address]  [place] [facility] [address]       REASON FOR VISIT: Assessment for neck pain      HISTORY OF PRESENT ILLNESS:  Mr. Rashid is a very pleasant 43 year-old gentleman with HIV, doing well on therapy, presenting to clinic for evaluation of progressive left sided neck pain.  He first noticed the pain last Saturday.  Pain was located in the mid-lateral neck. He denied preceding trauma and had been physically feeling well.  Over the next few days pain progressively worsened to the point that he is now having a difficult time swallowing and moving his neck around because of the pain.  Today he also noticed pain in his left ear.  He denies any respiratory sx or dysphagia.  He has noticed a mild sore throat and worsening of his chronically enlarged tonsils but is not sure how long this has been present.  No recent sick contacts, unprotected oral sex, dental work or travel.  He reports ongoing excellent adherence to his ARVs.       PAST MEDICAL HISTORY:   1.  HIV diagnosed in 07/2009.  Started on Atripla 08/2009.  CD4 stanislav 234 (11%) in 07/2009. Switched to Genvoya 4/6/2017.  2.  Recurrent skin abscesses, last in 2011.   3.  History of chlamydia treated in 2011.   4.  History of depression, now resolved.   5.  GERD.      ALLERGIES:  No known drug allergies.      CURRENT MEDICATIONS:   1.  Genvoya.   2.  Multivitamin.      REVIEW OF SYSTEMS:  A 12-point review of systems was obtained, pertinent positives and negatives as above.      PHYSICAL EXAMINATION:   VITAL SIGNS:  /87  Pulse 71  Temp 98.4  F (36.9  C) (Oral)  Ht 1.753 m (5' 9\")  Wt 75.1 kg (165 lb 8 oz)  SpO2 99%  BMI 24.44 kg/m2]   GENERAL:  He is a healthy-appearing gentleman in no acute distress.   HEENT:  Pupils equally round and reactive to light.  Extraocular muscles intact.  Oropharynx moist. Bilateral tonsillar enlargement L>R without exudates or erythema. TMs clear bilaterally.    NECK:  " Tenderness to palpation left lateral neck from mandible to base of neck. No overlying skin changes.  No discreet masses but there is palpable fullness. +mild nontender lymphadenopathy bilateral neck.   LUNGS:  Breathing comfortably, no stridor. Clear to auscultation bilaterally.   HEART:  Regular rate and rhythm with no murmurs, rubs or gallops.   ABDOMEN:  Soft, nontender, nondistended with positive bowel sounds and no hepatosplenomegaly or masses.   EXTREMITIES:  Warm and without edema.   SKIN:  No acute rashes or lesions.   NEUROLOGIC:  Cranial nerves grossly intact.  Gait unremarkable.      LABORATORY DATA:    Most recent labs obtained in 2017 reviewed.  He has had a consistently suppressed VL and CD4 >900.     IMAGING:  Neck CT: bilateral lymphadenopathy, no abscesses, clots or masses.     ASSESSMENT AND PLAN:  Mr. Rashid is a very pleasant 43-year-old gentleman with HIV, presenting to clinic for evaluation of progressive left sided neck pain.    1.  Neck pain:  Given progressive, severe pain and limited exam given very large tonsils, particularly on the left where his pain is, I opted to obtain a neck CT to assess for deeper neck space infection or other pathology. Fortunately, this demonstrated only lymphadenopathy.  - Given lack of tonsillar exudates, normal WBC and low CRP will not empirically start abx.   - Recommended supportive care (ibuprofen, rest, warm compresses to the neck).  - He will monitor his temp.  - He knows to contact me if symptoms do not improve or if he has ongoing fevers over the next few days.     2.  HIV. Continuing to do well on Genvoya.  No issues with adherence, most recent VL was <20 and CD4 was >900.   - Continue Genvoya.   - Will obtain routine HIV labs today.       3.  Routine HIV care.        Prophylaxis:            a.  None indicated given CD4 count.          Routine Infectious disease screening.             a.  Hepatitis B immune.             b.  Hepatitis C negative.              c.  Toxo IgG negative.             d.  CMV IgG positive.             e.  QuantiFERON Gold negative in 07/2009 with no risk factors for acquisition since.             f.   Declines STI screening (monogamous relationship).         Vaccination status.             a.  Hepatitis A immune.            b.  Hepatitis B immune.             c.  Pneumovax given in 2009, booster 2015.             d.  Prevnar 07/2014.               e.  Will continue yearly influenza vaccination.             f.   Menactra series complete.       Cardiovascular and renal risk factors.               a.  Creatinine and fasting glucose have been within normal limits.             b.  UA at follow up.              c.  Lipids at follow up.               d.  BP reasonable.             e.  He is a nonsmoker.               f.   He is a healthy weight and exercises regularly.   7.  Cancer screening.            a.  Anal Pap negative 5/2017, will repeat at follow up.        Mr. Rashid will contact me if symptoms do not improve or if any new issues arise. Otherwise, will see him back in clinic this summer.      Lisa Pemberton MD  [phone]        Lisa Pemberton MD

## 2018-03-29 NOTE — NURSING NOTE
Chief Complaint   Patient presents with     RECHECK     throat and ear   Pt roomed, vitals, meds, and allergies reviewed with pt. Pt ready for provider.  Arik Martínez, CMA

## 2018-03-29 NOTE — MR AVS SNAPSHOT
After Visit Summary   3/29/2018    Shan Rashid    MRN: [id number]           Patient Information     Date Of Birth          1975        Visit Information        Provider Department      3/29/2018 4:00 PM Lisa Pemberton MD [facility] Infectious Diseases        Today's Diagnoses     Neck pain    -  1    Human immunodeficiency virus (HIV) disease (H)           Follow-ups after your visit        Follow-up notes from your care team     Return in about 3 months (around 6/29/2018).      Who to contact     If you have questions or need follow up information about today's clinic visit or your schedule please contact [facility] AND INFECTIOUS DISEASES directly at [phone].  Normal or non-critical lab and imaging results will be communicated to you by MyChart, letter or phone within 4 business days after the clinic has received the results. If you do not hear from us within 7 days, please contact the clinic through "[employer]hart or phone. If you have a critical or abnormal lab result, we will notify you by phone as soon as possible.  Submit refill requests through [facility] or call your pharmacy and they will forward the refill request to us. Please allow 3 business days for your refill to be completed.          Additional Information About Your Visit        MyChart Information     [facility] gives you secure access to your electronic health record. If you see a primary care provider, you can also send messages to your care team and make appointments. If you have questions, please call your primary care clinic.  If you do not have a primary care provider, please call [phone] and they will assist you.        Care EveryWhere ID     This is your Care EveryWhere ID. This could be used by other organizations to access your [facility] medical records  [id number]        Your Vitals Were     Pulse Temperature Height Pulse Oximetry BMI (Body Mass Index)       71 98.4  F (36.9  " C) (Oral) 1.753 m (5' 9\") 99% 24.44 kg/m2        Blood Pressure from Last 3 Encounters:   03/29/18 130/87   12/22/17 122/76   11/02/17 131/90    Weight from Last 3 Encounters:   03/29/18 75.1 kg (165 lb 8 oz)   12/22/17 74.8 kg (165 lb)   11/02/17 72.1 kg (159 lb)               Primary Care Provider Fax #    Physician No Ref-Primary [phone]       No address on file        Equal Access to Services     [facility]: Hadii allyn serrano Soomaali, waaxda luqadaha, qaybta kaalmada uday, iram mead . So [facility] [phone].    ATENCIÓN: Si habla español, tiene a randall disposición servicios gratuitos de asistencia lingüística. [facility] [phone].    We comply with applicable federal civil rights laws and [place] laws. We do not discriminate on the basis of race, color, national origin, age, disability, sex, sexual orientation, or gender identity.            Thank you!     Thank you for choosing [facility] AND INFECTIOUS DISEASES  for your care. Our goal is always to provide you with excellent care. Hearing back from our patients is one way we can continue to improve our services. Please take a few minutes to complete the written survey that you may receive in the mail after your visit with us. Thank you!             Your Updated Medication List - Protect others around you: Learn how to safely use, store and throw away your medicines at www.disposemymeds.org.          This list is accurate as of 3/29/18 11:59 PM.  Always use your most recent med list.                   Brand Name Dispense Instructions for use Diagnosis    DAILY MULTIVITAMIN PO      Take  by mouth daily.        Cyldofu-Ohzfd-Yvtvkyhe-TenofAF [phone] MG Tabs per tablet    GENVOYA    30 tablet    Take 1 tablet by mouth daily (with breakfast)    Human immunodeficiency virus (HIV) disease       IBUPROFEN PO      Take 200 mg by mouth daily          "

## 2018-03-29 NOTE — LETTER
Date:April 3, 2018      Patient was self referred, no letter generated. Do not send.        [facility] Health Information

## 2018-03-30 LAB
CD3 CELLS # BLD: 1673 CELLS/UL (ref 603–2990)
CD3 CELLS NFR BLD: 79 % (ref 49–84)
CD3+CD4+ CELLS # BLD: 869 CELLS/UL (ref 441–2156)
CD3+CD4+ CELLS NFR BLD: 41 % (ref 28–63)
CD3+CD4+ CELLS/CD3+CD8+ CLL BLD: 1.24 % (ref 1.4–2.6)
CD3+CD8+ CELLS # BLD: 696 CELLS/UL (ref 125–1312)
CD3+CD8+ CELLS NFR BLD: 33 % (ref 10–40)
IFC SPECIMEN: ABNORMAL

## 2018-03-30 NOTE — PROGRESS NOTES
"REASON FOR VISIT: Assessment for neck pain      HISTORY OF PRESENT ILLNESS:  Mr. Rashid is a very pleasant 43 year-old gentleman with HIV, doing well on therapy, presenting to clinic for evaluation of progressive left sided neck pain.  He first noticed the pain last Saturday.  Pain was located in the mid-lateral neck. He denied preceding trauma and had been physically feeling well.  Over the next few days pain progressively worsened to the point that he is now having a difficult time swallowing and moving his neck around because of the pain.  Today he also noticed pain in his left ear.  He denies any respiratory sx or dysphagia.  He has noticed a mild sore throat and worsening of his chronically enlarged tonsils but is not sure how long this has been present.  No recent sick contacts, unprotected oral sex, dental work or travel.  He reports ongoing excellent adherence to his ARVs.       PAST MEDICAL HISTORY:   1.  HIV diagnosed in 07/2009.  Started on Atripla 08/2009.  CD4 stanislav 234 (11%) in 07/2009. Switched to Genvoya 4/6/2017.  2.  Recurrent skin abscesses, last in 2011.   3.  History of chlamydia treated in 2011.   4.  History of depression, now resolved.   5.  GERD.      ALLERGIES:  No known drug allergies.      CURRENT MEDICATIONS:   1.  Genvoya.   2.  Multivitamin.      REVIEW OF SYSTEMS:  A 12-point review of systems was obtained, pertinent positives and negatives as above.      PHYSICAL EXAMINATION:   VITAL SIGNS:  /87  Pulse 71  Temp 98.4  F (36.9  C) (Oral)  Ht 1.753 m (5' 9\")  Wt 75.1 kg (165 lb 8 oz)  SpO2 99%  BMI 24.44 kg/m2]   GENERAL:  He is a healthy-appearing gentleman in no acute distress.   HEENT:  Pupils equally round and reactive to light.  Extraocular muscles intact.  Oropharynx moist. Bilateral tonsillar enlargement L>R without exudates or erythema. TMs clear bilaterally.    NECK:  Tenderness to palpation left lateral neck from mandible to base of neck. No overlying skin " changes.  No discreet masses but there is palpable fullness. +mild nontender lymphadenopathy bilateral neck.   LUNGS:  Breathing comfortably, no stridor. Clear to auscultation bilaterally.   HEART:  Regular rate and rhythm with no murmurs, rubs or gallops.   ABDOMEN:  Soft, nontender, nondistended with positive bowel sounds and no hepatosplenomegaly or masses.   EXTREMITIES:  Warm and without edema.   SKIN:  No acute rashes or lesions.   NEUROLOGIC:  Cranial nerves grossly intact.  Gait unremarkable.      LABORATORY DATA:    Most recent labs obtained in 2017 reviewed.  He has had a consistently suppressed VL and CD4 >900.     IMAGING:  Neck CT: bilateral lymphadenopathy, no abscesses, clots or masses.     ASSESSMENT AND PLAN:  Mr. Rashid is a very pleasant 43-year-old gentleman with HIV, presenting to clinic for evaluation of progressive left sided neck pain.    1.  Neck pain:  Given progressive, severe pain and limited exam given very large tonsils, particularly on the left where his pain is, I opted to obtain a neck CT to assess for deeper neck space infection or other pathology. Fortunately, this demonstrated only lymphadenopathy.  - Given lack of tonsillar exudates, normal WBC and low CRP will not empirically start abx.   - Recommended supportive care (ibuprofen, rest, warm compresses to the neck).  - He will monitor his temp.  - He knows to contact me if symptoms do not improve or if he has ongoing fevers over the next few days.     2.  HIV. Continuing to do well on Genvoya.  No issues with adherence, most recent VL was <20 and CD4 was >900.   - Continue Genvoya.   - Will obtain routine HIV labs today.       3.  Routine HIV care.        Prophylaxis:            a.  None indicated given CD4 count.          Routine Infectious disease screening.             a.  Hepatitis B immune.             b.  Hepatitis C negative.             c.  Toxo IgG negative.             d.  CMV IgG positive.             e.  QuantiFERON  Gold negative in 07/2009 with no risk factors for acquisition since.             f.   Declines STI screening (monogamous relationship).         Vaccination status.             a.  Hepatitis A immune.            b.  Hepatitis B immune.             c.  Pneumovax given in 2009, booster 2015.             d.  Prevnar 07/2014.               e.  Will continue yearly influenza vaccination.             f.   Menactra series complete.       Cardiovascular and renal risk factors.               a.  Creatinine and fasting glucose have been within normal limits.             b.  UA at follow up.              c.  Lipids at follow up.               d.  BP reasonable.             e.  He is a nonsmoker.               f.   He is a healthy weight and exercises regularly.   7.  Cancer screening.            a.  Anal Pap negative 5/2017, will repeat at follow up.        Mr. Rashid will contact me if symptoms do not improve or if any new issues arise. Otherwise, will see him back in clinic this summer.      Lisa Pemberton MD  [phone]

## 2018-03-31 LAB
HIV1 RNA # PLAS NAA DL=20: NORMAL {COPIES}/ML
HIV1 RNA SERPL NAA+PROBE-LOG#: NORMAL {LOG_COPIES}/ML

## 2018-04-17 ENCOUNTER — TELEPHONE (OUTPATIENT)
Dept: PHARMACY | Facility: CLINIC | Age: 43
End: 2018-04-17

## 2018-04-17 NOTE — TELEPHONE ENCOUNTER
Attempted to contact the patient to for refill reminder call,  left message on voicemail    Follow up date:  4/20/18    [place]

## 2018-04-19 NOTE — TELEPHONE ENCOUNTER
Pt called to request refill  Will mail 1 prescriptions address has been verified.       Follow-up Date: 05/17/18    Galina Babin, [facility]  [phone]

## 2018-04-24 DIAGNOSIS — B20 HUMAN IMMUNODEFICIENCY VIRUS (HIV) DISEASE (H): ICD-10-CM

## 2018-05-18 ENCOUNTER — TELEPHONE (OUTPATIENT)
Dept: PHARMACY | Facility: CLINIC | Age: 43
End: 2018-05-18

## 2018-05-18 NOTE — TELEPHONE ENCOUNTER
Attempted to contact the patient to for refill reminder call,  left message with text.    Follow-up Date: 05/21/18 2nd attempt    Galina Babin, [facility]  [phone]

## 2018-06-18 ENCOUNTER — TELEPHONE (OUTPATIENT)
Dept: PHARMACY | Facility: CLINIC | Age: 43
End: 2018-06-18

## 2018-06-18 NOTE — TELEPHONE ENCOUNTER
Attempted to contact the patient to for refill reminder call,  left message on voicemail    Follow-up Date: 6/20/18 1st attempt    Anne Brar, [facility]  [phone]

## 2018-07-06 ENCOUNTER — TELEPHONE (OUTPATIENT)
Dept: PHARMACY | Facility: CLINIC | Age: 43
End: 2018-07-06

## 2018-07-06 NOTE — TELEPHONE ENCOUNTER
Called patient for refill reminder.    Patient will   1 prescriptions on 07/06/18    4 month of on time refill.    Follow-up Date: 08/02/18    Galina Babin [facility]  [phone]

## 2018-07-31 ENCOUNTER — TELEPHONE (OUTPATIENT)
Dept: PHARMACY | Facility: CLINIC | Age: 43
End: 2018-07-31

## 2018-07-31 NOTE — TELEPHONE ENCOUNTER
Attempted to contact the patient to for refill reminder call,  left message on voicemail    Follow-up Date: 08/03

## 2018-08-01 NOTE — TELEPHONE ENCOUNTER
Pt called to order refill.   Will mail 1 prescriptions address has been verified.   Talk to Pt at next fill about [place] to [place].   6 month of on time refill.    Follow-up Date: 08/31/18    Galina Babin CPhT  [phone]

## 2018-08-08 ENCOUNTER — MYC MEDICAL ADVICE (OUTPATIENT)
Dept: INFECTIOUS DISEASES | Facility: CLINIC | Age: 43
End: 2018-08-08

## 2018-08-09 DIAGNOSIS — L73.9 FOLLICULITIS: Primary | ICD-10-CM

## 2018-08-09 RX ORDER — SULFAMETHOXAZOLE/TRIMETHOPRIM 800-160 MG
1 TABLET ORAL 2 TIMES DAILY
Qty: 14 TABLET | Refills: 0 | Status: SHIPPED | OUTPATIENT
Start: 2018-08-09 | End: 2019-03-11

## 2018-08-31 ENCOUNTER — TELEPHONE (OUTPATIENT)
Dept: PHARMACY | Facility: CLINIC | Age: 43
End: 2018-08-31

## 2018-08-31 NOTE — TELEPHONE ENCOUNTER
Called patient for refill reminder.    Will mail 1 prescriptions address has been verified.     5 month of on time refill.    Follow-up Date: 09/27/18    Galina Babin, [facility]  [phone]

## 2018-09-28 ENCOUNTER — TELEPHONE (OUTPATIENT)
Dept: PHARMACY | Facility: CLINIC | Age: 43
End: 2018-09-28

## 2018-09-28 NOTE — TELEPHONE ENCOUNTER
Attempted to contact the patient to for refill reminder call,  left message on voicemail    Follow-up Date: 10/02/18  2ND ATTEMPT    Galina Babin, [facility]  [phone]

## 2018-10-03 NOTE — TELEPHONE ENCOUNTER
Pt called back.   Will mail 1 prescriptions address has been verified.     Follow-up Date: 10/29/18    Galina Babin, [facility]  [phone]

## 2018-10-29 ENCOUNTER — TELEPHONE (OUTPATIENT)
Dept: PHARMACY | Facility: CLINIC | Age: 43
End: 2018-10-29

## 2018-10-29 NOTE — TELEPHONE ENCOUNTER
Attempted to contact the patient to for refill reminder call,  left message on voicemail    Follow-up Date: 11/01

## 2018-12-05 ENCOUNTER — TELEPHONE (OUTPATIENT)
Dept: PHARMACY | Facility: CLINIC | Age: 43
End: 2018-12-05

## 2018-12-05 NOTE — TELEPHONE ENCOUNTER
Attempted to contact the patient to for refill reminder call,  left message on voicemail    Follow-up Date: 12/10/18 2nd attempt    Galina Babin, [facility]  [phone]

## 2018-12-10 NOTE — TELEPHONE ENCOUNTER
Called patient for refill reminder.    Will mail 1 prescriptions address has been verified.     Follow-up Date: 01/04/19    Galina Babin, [facility]  [phone]

## 2019-01-03 ENCOUNTER — TELEPHONE (OUTPATIENT)
Dept: PHARMACY | Facility: CLINIC | Age: 44
End: 2019-01-03

## 2019-01-03 NOTE — TELEPHONE ENCOUNTER
Attempted to contact the patient to for refill reminder call,  left message on voicemail    Follow-up Date: 01/09

## 2019-01-19 ENCOUNTER — OFFICE VISIT (OUTPATIENT)
Dept: URGENT CARE | Facility: URGENT CARE | Age: 44
End: 2019-01-19
Payer: COMMERCIAL

## 2019-01-19 VITALS
HEART RATE: 87 BPM | RESPIRATION RATE: 16 BRPM | DIASTOLIC BLOOD PRESSURE: 80 MMHG | SYSTOLIC BLOOD PRESSURE: 118 MMHG | OXYGEN SATURATION: 98 % | TEMPERATURE: 98 F

## 2019-01-19 DIAGNOSIS — B20 HUMAN IMMUNODEFICIENCY VIRUS (HIV) DISEASE (H): ICD-10-CM

## 2019-01-19 DIAGNOSIS — R09.81 NASAL CONGESTION: ICD-10-CM

## 2019-01-19 DIAGNOSIS — R05.9 COUGH: ICD-10-CM

## 2019-01-19 DIAGNOSIS — R51.9 NONINTRACTABLE HEADACHE, UNSPECIFIED CHRONICITY PATTERN, UNSPECIFIED HEADACHE TYPE: Primary | ICD-10-CM

## 2019-01-19 PROCEDURE — 99214 OFFICE O/P EST MOD 30 MIN: CPT | Performed by: FAMILY MEDICINE

## 2019-01-19 RX ORDER — CETIRIZINE HYDROCHLORIDE 10 MG/1
10 TABLET ORAL DAILY
Qty: 30 TABLET | Refills: 0 | Status: SHIPPED | OUTPATIENT
Start: 2019-01-19 | End: 2019-03-11

## 2019-01-19 RX ORDER — FLUTICASONE PROPIONATE 50 MCG
2 SPRAY, SUSPENSION (ML) NASAL DAILY
Qty: 1 BOTTLE | Refills: 0 | Status: SHIPPED | OUTPATIENT
Start: 2019-01-19 | End: 2019-03-11

## 2019-01-19 NOTE — PROGRESS NOTES
SUBJECTIVE: Shan Rashid is a 43 year old male presenting with a chief complaint of nasal congestion and facial pain/pressure.  Onset of symptoms was day(s) ago.  Course of illness is worsening.    Severity moderate  Current and Associated symptoms: runny nose and stuffy nose  Treatment measures tried include sudafed.  Predisposing factors include HIV.    Past Medical History:   Diagnosis Date     Depression      Human immunodeficiency virus (HIV) disease (H) Diagnosed .    Started on Atripla 09.       Perirectal abscess .    Responded to therapy with bactrim.     Allergies   Allergen Reactions     Nkda [No Known Drug Allergies]      Social History     Tobacco Use     Smoking status: Former Smoker     Years: 3.00     Types: Cigarettes     Last attempt to quit: 2000     Years since quittin.3     Smokeless tobacco: Never Used   Substance Use Topics     Alcohol use: Yes     Comment: 2 drinks week, usually beer       ROS:  SKIN: no rash  GI: no vomiting    OBJECTIVE:  /80   Pulse 87   Temp 98  F (36.7  C) (Oral)   Resp 16   SpO2 98% GENERAL APPEARANCE: healthy, alert and no distress  EYES: EOMI,  PERRL, conjunctiva clear  HENT: ear canals and TM's normal.  Nose and mouth without ulcers, erythema or lesions  NECK: supple, nontender, no lymphadenopathy  RESP: lungs clear to auscultation - no rales, rhonchi or wheezes  CV: regular rates and rhythm, normal S1 S2, no murmur noted  ABDOMEN:  soft, nontender, no HSM or masses and bowel sounds normal  NEURO: Normal strength and tone, sensory exam grossly normal,  normal speech and mentation  SKIN: no suspicious lesions or rashes      ICD-10-CM    1. Nonintractable headache, unspecified chronicity pattern, unspecified headache type R51 fluticasone (FLONASE) 50 MCG/ACT nasal spray     cetirizine (ZYRTEC) 10 MG tablet   2. Nasal congestion R09.81 fluticasone (FLONASE) 50 MCG/ACT nasal spray     cetirizine (ZYRTEC) 10 MG tablet    3. Cough R05    4. Human immunodeficiency virus (HIV) disease (H) B20      Fluids/Rest, f/u if worse/not any better

## 2019-01-23 ENCOUNTER — TELEPHONE (OUTPATIENT)
Dept: PHARMACY | Facility: CLINIC | Age: 44
End: 2019-01-23

## 2019-01-23 NOTE — TELEPHONE ENCOUNTER
Called patient for refill reminder.    Patient will   1 prescriptions on 01/10/19    Follow-up Date: 02/05/19    Galina Babin, [facility]  [phone]

## 2019-03-01 ENCOUNTER — TELEPHONE (OUTPATIENT)
Dept: PHARMACY | Facility: CLINIC | Age: 44
End: 2019-03-01

## 2019-03-11 ENCOUNTER — OFFICE VISIT (OUTPATIENT)
Dept: URGENT CARE | Facility: URGENT CARE | Age: 44
End: 2019-03-11
Payer: COMMERCIAL

## 2019-03-11 VITALS
SYSTOLIC BLOOD PRESSURE: 133 MMHG | BODY MASS INDEX: 25.55 KG/M2 | WEIGHT: 173 LBS | TEMPERATURE: 97 F | DIASTOLIC BLOOD PRESSURE: 76 MMHG | HEART RATE: 82 BPM

## 2019-03-11 DIAGNOSIS — Z20.2 EXPOSURE TO SYPHILIS: Primary | ICD-10-CM

## 2019-03-11 PROCEDURE — 0064U ANTB TP TOTAL&RPR IA QUAL: CPT | Performed by: PHYSICIAN ASSISTANT

## 2019-03-11 PROCEDURE — 96372 THER/PROPH/DIAG INJ SC/IM: CPT | Performed by: PHYSICIAN ASSISTANT

## 2019-03-11 PROCEDURE — 36415 COLL VENOUS BLD VENIPUNCTURE: CPT | Performed by: PHYSICIAN ASSISTANT

## 2019-03-11 PROCEDURE — 99213 OFFICE O/P EST LOW 20 MIN: CPT | Mod: 25 | Performed by: PHYSICIAN ASSISTANT

## 2019-03-11 NOTE — PROGRESS NOTES
SUBJECTIVE:   Shan Rashid is a 44 year old male presenting with a chief complaint of having syphilis exposure.  Patient was told my an older partner that he has syphilis  He has no active symptoms.    Past Medical History:   Diagnosis Date     Depression      Human immunodeficiency virus (HIV) disease (H) Diagnosed .    Started on Atripla 09.       Perirectal abscess .    Responded to therapy with bactrim.        Allergies   Allergen Reactions     Nkda [No Known Drug Allergies]      Family History  Allergies  HTN      Social History     Tobacco Use     Smoking status: Former Smoker     Years: 3.00     Types: Cigarettes     Last attempt to quit: 2000     Years since quittin.4     Smokeless tobacco: Never Used   Substance Use Topics     Alcohol use: Yes     Comment: 2 drinks week, usually beer       ROS:  CONSTITUTIONAL:NEGATIVE for fever, chills, change in weight  INTEGUMENTARY/SKIN: NEGATIVE for worrisome rashes, moles or lesions  ENT/MOUTH: NEGATIVE for ear, mouth and throat problems  RESP:NEGATIVE for significant cough or SOB  CV: NEGATIVE for chest pain, palpitations or peripheral edema  GI: NEGATIVE for nausea, abdominal pain, heartburn, or change in bowel habits  : negative for dysuria, hematuria, decreased urinary stream, erectile dysfunction  MUSCULOSKELETAL: NEGATIVE for significant arthralgias or myalgia  NEURO: NEGATIVE for weakness, dizziness or paresthesias    OBJECTIVE  :/76   Pulse 82   Temp 97  F (36.1  C) (Oral)   Wt 78.5 kg (173 lb)   BMI 25.55 kg/m    GENERAL APPEARANCE: healthy, alert and no distress  NECK: supple, nontender, no lymphadenopathy  RESP: lungs clear to auscultation - no rales, rhonchi or wheezes  CV: regular rates and rhythm, normal S1 S2, no murmur noted  ABDOMEN:  soft, nontender, no HSM or masses and bowel sounds normal  NEURO: Normal strength and tone, sensory exam grossly normal,  normal speech and mentation  SKIN: no  suspicious lesions or rashes    ASSESSMENT/PLAN      ICD-10-CM    1. Exposure to syphilis Z20.2 penicillin G benzathine (BICILLIN L-A) injection 2.4 Million Units     Treponema Abs w Reflex to RPR and Titer       Orders Placed This Encounter     Treponema Abs w Reflex to RPR and Titer     penicillin G benzathine (BICILLIN L-A) injection 2.4 Million Units     Patient tested and treated for syphilis in clinic  Repeat test in 1 month

## 2019-03-13 ENCOUNTER — TELEPHONE (OUTPATIENT)
Dept: URGENT CARE | Facility: URGENT CARE | Age: 44
End: 2019-03-13

## 2019-03-13 LAB — T PALLIDUM AB SER QL: NONREACTIVE

## 2019-04-05 ENCOUNTER — TELEPHONE (OUTPATIENT)
Dept: PHARMACY | Facility: CLINIC | Age: 44
End: 2019-04-05

## 2019-04-05 ENCOUNTER — TELEPHONE (OUTPATIENT)
Dept: INTERNAL MEDICINE | Facility: CLINIC | Age: 44
End: 2019-04-05

## 2019-04-05 NOTE — TELEPHONE ENCOUNTER
Attempted to contact the patient to for refill reminder call,  left message on voicemail    Follow-up Date: 04/11/19    Galina Babin [facility]  [phone]

## 2019-05-10 ENCOUNTER — TELEPHONE (OUTPATIENT)
Dept: INFECTIOUS DISEASES | Facility: CLINIC | Age: 44
End: 2019-05-10

## 2019-05-10 DIAGNOSIS — B20 HUMAN IMMUNODEFICIENCY VIRUS (HIV) DISEASE (H): ICD-10-CM

## 2019-05-10 DIAGNOSIS — B20 HUMAN IMMUNODEFICIENCY VIRUS (HIV) DISEASE (H): Primary | ICD-10-CM

## 2019-05-10 RX ORDER — ELVITEGRAVIR, COBICISTAT, EMTRICITABINE, AND TENOFOVIR ALAFENAMIDE 150; 150; 200; 10 MG/1; MG/1; MG/1; MG/1
TABLET ORAL
Qty: 90 TABLET | Refills: 0 | Status: SHIPPED | OUTPATIENT
Start: 2019-05-10 | End: 2019-07-31

## 2019-05-10 NOTE — TELEPHONE ENCOUNTER
Lab orders to be done before patient's appointment with Dr. Pemberton on 06/06 entered. Patient notified.    Fiordaliza Mcgrath RN

## 2019-05-10 NOTE — TELEPHONE ENCOUNTER
Per [facility] Ambulatory Care Protocol, ok to refill medication. New prescription ordered.    Fiordaliza Mcgrath RN

## 2019-05-10 NOTE — PROGRESS NOTES
Per [facility] Ambulatory Care Protocol, Pt is due for routine labs based on disease state or monitoring of medications. Lab orders entered.    Fiordaliza Mcgrath RN

## 2019-05-10 NOTE — TELEPHONE ENCOUNTER
[facility] Call Center    Phone Message    May a detailed message be left on voicemail: yes    Reason for Call: Order(s): Other:   Reason for requested: Lab orders  Date needed: one week before current appointment on 6/6  Provider name: Dr. Lisa Pemberton      Action Taken: Message routed to:  [facility] ([facility] Infectious Disease

## 2019-05-28 DIAGNOSIS — B20 HUMAN IMMUNODEFICIENCY VIRUS (HIV) DISEASE (H): ICD-10-CM

## 2019-05-28 LAB
ALBUMIN SERPL-MCNC: 4 G/DL (ref 3.4–5)
ALP SERPL-CCNC: 84 U/L (ref 40–150)
ALT SERPL W P-5'-P-CCNC: 42 U/L (ref 0–70)
ANION GAP SERPL CALCULATED.3IONS-SCNC: 6 MMOL/L (ref 3–14)
AST SERPL W P-5'-P-CCNC: 23 U/L (ref 0–45)
BASOPHILS # BLD AUTO: 0 10E9/L (ref 0–0.2)
BASOPHILS NFR BLD AUTO: 0.3 %
BILIRUB SERPL-MCNC: 0.2 MG/DL (ref 0.2–1.3)
BUN SERPL-MCNC: 13 MG/DL (ref 7–30)
CALCIUM SERPL-MCNC: 9.4 MG/DL (ref 8.5–10.1)
CHLORIDE SERPL-SCNC: 104 MMOL/L (ref 94–109)
CO2 SERPL-SCNC: 29 MMOL/L (ref 20–32)
CREAT SERPL-MCNC: 0.94 MG/DL (ref 0.66–1.25)
DIFFERENTIAL METHOD BLD: NORMAL
EOSINOPHIL # BLD AUTO: 0.1 10E9/L (ref 0–0.7)
EOSINOPHIL NFR BLD AUTO: 1.3 %
ERYTHROCYTE [DISTWIDTH] IN BLOOD BY AUTOMATED COUNT: 13 % (ref 10–15)
GFR SERPL CREATININE-BSD FRML MDRD: >90 ML/MIN/{1.73_M2}
GLUCOSE SERPL-MCNC: 79 MG/DL (ref 70–99)
HCT VFR BLD AUTO: 41.6 % (ref 40–53)
HGB BLD-MCNC: 14.4 G/DL (ref 13.3–17.7)
IMM GRANULOCYTES # BLD: 0 10E9/L (ref 0–0.4)
IMM GRANULOCYTES NFR BLD: 0.3 %
LYMPHOCYTES # BLD AUTO: 2.6 10E9/L (ref 0.8–5.3)
LYMPHOCYTES NFR BLD AUTO: 33.4 %
MCH RBC QN AUTO: 28.8 PG (ref 26.5–33)
MCHC RBC AUTO-ENTMCNC: 34.6 G/DL (ref 31.5–36.5)
MCV RBC AUTO: 83 FL (ref 78–100)
MONOCYTES # BLD AUTO: 0.7 10E9/L (ref 0–1.3)
MONOCYTES NFR BLD AUTO: 9.1 %
NEUTROPHILS # BLD AUTO: 4.4 10E9/L (ref 1.6–8.3)
NEUTROPHILS NFR BLD AUTO: 55.6 %
NRBC # BLD AUTO: 0 10*3/UL
NRBC BLD AUTO-RTO: 0 /100
PLATELET # BLD AUTO: 244 10E9/L (ref 150–450)
POTASSIUM SERPL-SCNC: 3.8 MMOL/L (ref 3.4–5.3)
PROT SERPL-MCNC: 7.5 G/DL (ref 6.8–8.8)
RBC # BLD AUTO: 5 10E12/L (ref 4.4–5.9)
SODIUM SERPL-SCNC: 139 MMOL/L (ref 133–144)
WBC # BLD AUTO: 7.8 10E9/L (ref 4–11)

## 2019-05-29 LAB
CD3 CELLS # BLD: 1992 CELLS/UL (ref 603–2990)
CD3 CELLS NFR BLD: 78 % (ref 49–84)
CD3+CD4+ CELLS # BLD: 947 CELLS/UL (ref 441–2156)
CD3+CD4+ CELLS NFR BLD: 37 % (ref 28–63)
CD3+CD4+ CELLS/CD3+CD8+ CLL BLD: 1.06 % (ref 1.4–2.6)
CD3+CD8+ CELLS # BLD: 881 CELLS/UL (ref 125–1312)
CD3+CD8+ CELLS NFR BLD: 35 % (ref 10–40)
IFC SPECIMEN: ABNORMAL

## 2019-06-05 ENCOUNTER — MYC MEDICAL ADVICE (OUTPATIENT)
Dept: INFECTIOUS DISEASES | Facility: CLINIC | Age: 44
End: 2019-06-05

## 2019-06-05 ENCOUNTER — TELEPHONE (OUTPATIENT)
Dept: PHARMACY | Facility: CLINIC | Age: 44
End: 2019-06-05

## 2019-06-05 NOTE — TELEPHONE ENCOUNTER
Attempted to contact the patient to for refill reminder call,  left message on voicemail. Will fill Genvoya and put on shelf for  after his appointment.     Last filled on: 05/13/19    Follow-up Date: 07/03/19    Galina Babin CPhT  [facility]  [phone]

## 2019-06-05 NOTE — PROGRESS NOTES
REASON FOR VISIT: Follow up for HIV.      HISTORY OF PRESENT ILLNESS:  Mr. aRshid is a very pleasant 44 year-old gentleman with HIV, doing well on therapy, presenting to clinic for follow up.  He was last seen in March, 2018.  Since this time, he has overall done well.  His only health issue was an exposure to syphilis in March.  He was seen in Urgent Care and was given a single dose of IM PCN; testing was negative.    He has no concerns today.  He is doing well on Genvoya - he denies issues with adherence and has no side effects. His mood has been good. 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  He and his  now own a home and are very happy with it.      PAST MEDICAL HISTORY:   1.  HIV diagnosed in 07/2009.  Started on Atripla 08/2009.  CD4 stanislav 234 (11%) in 07/2009. Switched to Genvoya 4/6/2017.  2.  Recurrent skin abscesses.   3.  History of chlamydia.   4.  History of depression, now resolved.   5.  GERD.      ALLERGIES:  No known drug allergies.      CURRENT MEDICATIONS:   1.  Genvoya.   2.  Multivitamin.      REVIEW OF SYSTEMS:  A 12-point review of systems was obtained, pertinent positives and negatives as above.      PHYSICAL EXAMINATION:   VITAL SIGNS: Vitals: /81   Pulse 87   Temp 98.4  F (36.9  C) (Oral)   Wt 74.7 kg (164 lb 9.6 oz)   SpO2 99%   BMI 24.31 kg/m    BMI= Body mass index is 24.31 kg/m .  GENERAL:  He is a healthy-appearing gentleman in no acute distress.   HEENT:  Pupils equally round and reactive to light.  Extraocular muscles intact.  Oropharynx moist and clear.   NECK:  Supple, nontender.   LUNGS:  Clear to auscultation bilaterally.   HEART:  Regular rate and rhythm with no murmurs, rubs or gallops.   ABDOMEN:  Soft, nontender, nondistended with positive bowel sounds and no hepatosplenomegaly or masses. No anal lesions.   EXTREMITIES:  Warm and without edema.   SKIN:  No acute rashes or lesions.   NEUROLOGIC:  Cranial nerves grossly  intact.  Gait unremarkable.      LABORATORY DATA:    5/28/19: CBC and CMP unremarkable. VL undetectable, CD4 >947 (stable).      ASSESSMENT AND PLAN:  Mr. Rashid is a very pleasant 44-year-old gentleman with HIV, presenting to clinic for follow up.     1.  HIV. Continuing to do well on Genvoya.  No issues with adherence, most recent VL was undetectable and CD4 was >900.   - Continue Genvoya.   - See below for routine HIV care.       2.  Routine HIV care.        Prophylaxis:            a.  None indicated given CD4 count.          Routine Infectious disease screening:             a.  Hepatitis B immune.             b.  Hepatitis C negative.             c.  Toxo IgG negative.             d.  CMV IgG positive.             e.  QuantiFERON Gold negative in 07/2009 with no risk factors for acquisition since.             f.   Declines STI screening today.       Vaccination status.             a.  Hepatitis A immune.            b.  Hepatitis B immune.             c.  Pneumovax given in 2009, booster 2015.             d.  Prevnar 07/2014.               e.  Will continue yearly influenza vaccination.             f.   Menactra series complete.            g   Not interested in gardasil 9 (now approved up to age 45)       Cardiovascular and renal risk factors.               a.  Creatinine and fasting glucose have been within normal limits.             b.  Lipids with next blood draw.               c.  BP reasonable.             d.  He is a nonsmoker.               e.   He is a healthy weight and exercises regularly.   7.  Cancer screening.            a.  Anal Pap negative 5/2017, will repeat today.       Mr. Rashid will follow up in clinic in one year; he knows to contact me if issues arise prior.     Lisa Pemberton MD  [phone]

## 2019-06-06 ENCOUNTER — OFFICE VISIT (OUTPATIENT)
Dept: INFECTIOUS DISEASES | Facility: CLINIC | Age: 44
End: 2019-06-06
Attending: COLON & RECTAL SURGERY
Payer: COMMERCIAL

## 2019-06-06 VITALS
TEMPERATURE: 98.4 F | SYSTOLIC BLOOD PRESSURE: 137 MMHG | OXYGEN SATURATION: 99 % | DIASTOLIC BLOOD PRESSURE: 81 MMHG | WEIGHT: 164.6 LBS | BODY MASS INDEX: 24.31 KG/M2 | HEART RATE: 87 BPM

## 2019-06-06 DIAGNOSIS — B20 HUMAN IMMUNODEFICIENCY VIRUS (HIV) DISEASE (H): Primary | ICD-10-CM

## 2019-06-06 PROCEDURE — 88112 CYTOPATH CELL ENHANCE TECH: CPT | Performed by: COLON & RECTAL SURGERY

## 2019-06-06 PROCEDURE — G0463 HOSPITAL OUTPT CLINIC VISIT: HCPCS | Mod: ZF

## 2019-06-06 ASSESSMENT — PAIN SCALES - GENERAL: PAINLEVEL: NO PAIN (0)

## 2019-06-06 NOTE — NURSING NOTE
"Chief Complaint   Patient presents with     RECHECK     b20     Vital signs:  Temp: 98.4  F (36.9  C) Temp src: Oral BP: 137/81 Pulse: 87     SpO2: 99 %       Weight: 74.7 kg (164 lb 9.6 oz)  Estimated body mass index is 24.31 kg/m  as calculated from the following:    Height as of 3/29/18: 1.753 m (5' 9\").    Weight as of this encounter: 74.7 kg (164 lb 9.6 oz).        Izzy Anderson    "

## 2019-06-06 NOTE — LETTER
6/6/2019      RE: hSan Rashid  [address] [facility] [address]       REASON FOR VISIT: Follow up for HIV.      HISTORY OF PRESENT ILLNESS:  Mr. Rashid is a very pleasant 44 year-old gentleman with HIV, doing well on therapy, presenting to clinic for follow up.  He was last seen in March, 2018.  Since this time, he has overall done well.  His only health issue was an exposure to syphilis in March.  He was seen in Urgent Care and was given a single dose of IM PCN; testing was negative.    He has no concerns today.  He is doing well on Genvoya - he denies issues with adherence and has no side effects. His mood has been good. 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  He and his  now own a home and are very happy with it.      PAST MEDICAL HISTORY:   1.  HIV diagnosed in 07/2009.  Started on Atripla 08/2009.  CD4 stanislav 234 (11%) in 07/2009. Switched to Genvoya 4/6/2017.  2.  Recurrent skin abscesses.   3.  History of chlamydia.   4.  History of depression, now resolved.   5.  GERD.      ALLERGIES:  No known drug allergies.      CURRENT MEDICATIONS:   1.  Genvoya.   2.  Multivitamin.      REVIEW OF SYSTEMS:  A 12-point review of systems was obtained, pertinent positives and negatives as above.      PHYSICAL EXAMINATION:   VITAL SIGNS: Vitals: /81   Pulse 87   Temp 98.4  F (36.9  C) (Oral)   Wt 74.7 kg (164 lb 9.6 oz)   SpO2 99%   BMI 24.31 kg/m     BMI= Body mass index is 24.31 kg/m .  GENERAL:  He is a healthy-appearing gentleman in no acute distress.   HEENT:  Pupils equally round and reactive to light.  Extraocular muscles intact.  Oropharynx moist and clear.   NECK:  Supple, nontender.   LUNGS:  Clear to auscultation bilaterally.   HEART:  Regular rate and rhythm with no murmurs, rubs or gallops.   ABDOMEN:  Soft, nontender, nondistended with positive bowel sounds and no hepatosplenomegaly or masses. No anal lesions.   EXTREMITIES:  Warm and without  edema.   SKIN:  No acute rashes or lesions.   NEUROLOGIC:  Cranial nerves grossly intact.  Gait unremarkable.      LABORATORY DATA:    5/28/19: CBC and CMP unremarkable. VL undetectable, CD4 >947 (stable).      ASSESSMENT AND PLAN:  Mr. Rashid is a very pleasant 44-year-old gentleman with HIV, presenting to clinic for follow up.     1.  HIV. Continuing to do well on Genvoya.  No issues with adherence, most recent VL was undetectable and CD4 was >900.   - Continue Genvoya.   - See below for routine HIV care.       2.  Routine HIV care.        Prophylaxis:            a.  None indicated given CD4 count.          Routine Infectious disease screening:             a.  Hepatitis B immune.             b.  Hepatitis C negative.             c.  Toxo IgG negative.             d.  CMV IgG positive.             e.  QuantiFERON Gold negative in 07/2009 with no risk factors for acquisition since.             f.   Declines STI screening today.       Vaccination status.             a.  Hepatitis A immune.            b.  Hepatitis B immune.             c.  Pneumovax given in 2009, booster 2015.             d.  Prevnar 07/2014.               e.  Will continue yearly influenza vaccination.             f.   Menactra series complete.            g   Not interested in gardasil 9 (now approved up to age 45)       Cardiovascular and renal risk factors.               a.  Creatinine and fasting glucose have been within normal limits.             b.  Lipids with next blood draw.               c.  BP reasonable.             d.  He is a nonsmoker.               e.   He is a healthy weight and exercises regularly.   7.  Cancer screening.            a.  Anal Pap negative 5/2017, will repeat today.       Mr. Rashid will follow up in clinic in one year; he knows to contact me if issues arise prior.     Lisa Pemberton MD  [phone]        Lisa Pemberton MD

## 2019-06-06 NOTE — LETTER
Date:June 7, 2019      Patient was self referred, no letter generated. Do not send.        [facility] Information

## 2019-06-07 LAB — COPATH REPORT: NORMAL

## 2019-06-17 ENCOUNTER — TELEPHONE (OUTPATIENT)
Dept: INFECTIOUS DISEASES | Facility: CLINIC | Age: 44
End: 2019-06-17

## 2019-07-03 ENCOUNTER — TELEPHONE (OUTPATIENT)
Dept: PHARMACY | Facility: CLINIC | Age: 44
End: 2019-07-03

## 2019-07-03 NOTE — TELEPHONE ENCOUNTER
Attempted to contact the patient to for refill reminder call,  left message on voicemail    Last filled on: 06/05/19    Follow-up Date: 07/11/19 2nd attempt    Galina Babin CPhT  [facility]  [phone]

## 2019-07-11 ENCOUNTER — TELEPHONE (OUTPATIENT)
Dept: PHARMACY | Facility: CLINIC | Age: 44
End: 2019-07-11

## 2019-07-11 NOTE — TELEPHONE ENCOUNTER
Called patient for refill reminder.    Will mail [employer] prescription address has been verified.     Last Filled on: 06/05/2019   Follow-up Date: 08/12/2019    Thank you,  Natividad Montanez  Student Pharmacist

## 2019-07-31 DIAGNOSIS — B20 HUMAN IMMUNODEFICIENCY VIRUS (HIV) DISEASE (H): ICD-10-CM

## 2019-07-31 RX ORDER — ELVITEGRAVIR, COBICISTAT, EMTRICITABINE, AND TENOFOVIR ALAFENAMIDE 150; 150; 200; 10 MG/1; MG/1; MG/1; MG/1
TABLET ORAL
Qty: 90 TABLET | Refills: 3 | Status: SHIPPED | OUTPATIENT
Start: 2019-07-31 | End: 2020-08-20

## 2019-08-12 ENCOUNTER — TELEPHONE (OUTPATIENT)
Dept: PHARMACY | Facility: CLINIC | Age: 44
End: 2019-08-12

## 2019-08-12 NOTE — TELEPHONE ENCOUNTER
Setting call date for future fills.    Last filled: 08/03/19    Next Call Date: 09/05/19    Galina Babin CPhT  [facility]  [phone]

## 2019-09-06 ENCOUNTER — TELEPHONE (OUTPATIENT)
Dept: PHARMACY | Facility: CLINIC | Age: 44
End: 2019-09-06

## 2019-09-20 NOTE — TELEPHONE ENCOUNTER
Called patient for refill reminder.    Patient will   Genvoya prescriptions on  09/19/19    Last Filled on:  08/09/19   Follow-up Date: 10/15/19\    Galina Babin CPhT  [facility]  [phone]

## 2019-10-15 ENCOUNTER — TELEPHONE (OUTPATIENT)
Dept: PHARMACY | Facility: CLINIC | Age: 44
End: 2019-10-15

## 2019-10-15 NOTE — TELEPHONE ENCOUNTER
Attempted to contact the patient to for refill reminder call,  left message on voicemail    Last filled on: ?    Follow-up Date: 10/21/19  2nd attempt    Galina Babin CPhT  [facility]  [phone]

## 2019-10-22 NOTE — TELEPHONE ENCOUNTER
Called patient for refill reminder.    Will mail [employer] prescriptions address has been verified.     Last Filled on: 09/19/19   Follow-up Date: 11/14/19    Galina Babin CPhT  [facility]  [phone]

## 2019-11-14 ENCOUNTER — TELEPHONE (OUTPATIENT)
Dept: PHARMACY | Facility: CLINIC | Age: 44
End: 2019-11-14

## 2019-11-14 NOTE — TELEPHONE ENCOUNTER
Attempted to contact the patient to for refill reminder call,  left message on voicemail    Last filled on: 10/17/19    Follow-up Date: 11/20/19 2nd attempt    Galina Babin CPhT  [facility]  [phone]

## 2019-11-18 NOTE — TELEPHONE ENCOUNTER
Called patient for refill reminder.    Will mail [employer] prescriptions address has been verified.     Last Filled on: 10/17/19   Follow-up Date: 12/17/19    Galina Babin CPhT  [facility]  [phone]

## 2019-12-24 ENCOUNTER — TELEPHONE (OUTPATIENT)
Dept: PHARMACY | Facility: CLINIC | Age: 44
End: 2019-12-24

## 2019-12-24 NOTE — TELEPHONE ENCOUNTER
Setting call date for future fills.    Last filled: 12/17/19    Next Call Date: 01/14/20    Galina Babin CPhT  [facility]  [phone]

## 2020-02-14 ENCOUNTER — TELEPHONE (OUTPATIENT)
Dept: PHARMACY | Facility: CLINIC | Age: 45
End: 2020-02-14

## 2020-02-14 NOTE — TELEPHONE ENCOUNTER
Attempted to contact the patient to for refill reminder call,  left message on voicemail    Last filled on: 01/17/20    Follow-up Date: 02/20/20    Galina Babin CPhT  [facility]  [phone]

## 2020-02-19 NOTE — TELEPHONE ENCOUNTER
Called patient for refill reminder.    Will mail prescriptions address has been verified.     Genvoya    1 month of on time refill.    Last Filled on:01/17/2020   Follow-up Date: 03/12/2020    Colton Vega  -----------------------------------------------   [email or web address]  Pharmacy Technician  [facility]: [phone]

## 2020-02-24 ENCOUNTER — HEALTH MAINTENANCE LETTER (OUTPATIENT)
Age: 45
End: 2020-02-24

## 2020-03-06 ENCOUNTER — OFFICE VISIT (OUTPATIENT)
Dept: URGENT CARE | Facility: URGENT CARE | Age: 45
End: 2020-03-06
Payer: COMMERCIAL

## 2020-03-06 VITALS
TEMPERATURE: 97.9 F | HEART RATE: 90 BPM | HEIGHT: 69 IN | BODY MASS INDEX: 24.29 KG/M2 | RESPIRATION RATE: 14 BRPM | SYSTOLIC BLOOD PRESSURE: 128 MMHG | WEIGHT: 164 LBS | OXYGEN SATURATION: 98 % | DIASTOLIC BLOOD PRESSURE: 78 MMHG

## 2020-03-06 DIAGNOSIS — R09.81 NASAL CONGESTION: Primary | ICD-10-CM

## 2020-03-06 PROCEDURE — 99213 OFFICE O/P EST LOW 20 MIN: CPT | Performed by: PHYSICIAN ASSISTANT

## 2020-03-06 RX ORDER — CETIRIZINE HYDROCHLORIDE 10 MG/1
10 TABLET ORAL DAILY
Qty: 1 TABLET | Refills: 0 | Status: SHIPPED | OUTPATIENT
Start: 2020-03-06 | End: 2022-04-06

## 2020-03-06 RX ORDER — FLUTICASONE PROPIONATE 50 MCG
1 SPRAY, SUSPENSION (ML) NASAL DAILY
Qty: 9.9 G | Refills: 0 | Status: CANCELLED | OUTPATIENT
Start: 2020-03-06

## 2020-03-06 ASSESSMENT — MIFFLIN-ST. JEOR: SCORE: 1624.28

## 2020-03-06 ASSESSMENT — ENCOUNTER SYMPTOMS
FEVER: 0
SORE THROAT: 0
RHINORRHEA: 1
GASTROINTESTINAL NEGATIVE: 1
COUGH: 1
CHILLS: 0

## 2020-03-06 NOTE — PATIENT INSTRUCTIONS
1. Begin saline nasal wash to help with nasal discharge and congestion.   2. Begin Zyrtec 10 mg daily. This can be used up to two times daily, but it may cause drowsiness.   3. Follow up if you develop fevers, worsening symptoms, or no improvement after 4 more days.

## 2020-03-06 NOTE — PROGRESS NOTES
"SUBJECTIVE:   Shan Rashid is a 45 year old male presenting with a chief complaint of   Chief Complaint   Patient presents with     Nasal Congestion     negative fever, negative body aches       He is an established patient of [facility].    URI Adult    Patient experiencing nasal congestion for the past 4 days.  No known history of seasonal allergies.  He denies any itchy eyes, throat, or nose.  He has had mild watery eyes.  Season is currently changing and has had an seasonable warm up and snow melt.  Patient denies any fevers, chills, ear pain, throat pain, or GI upset.  He has not been taking any medications over-the-counter for symptoms.  He has past medical history of HIV and is currently taking Genvoya.      Review of Systems   Constitutional: Negative for chills and fever.   HENT: Positive for congestion and rhinorrhea. Negative for ear pain and sore throat.    Respiratory: Positive for cough (Mild, dry).    Gastrointestinal: Negative.        Past Medical History:   Diagnosis Date     Depression      Human immunodeficiency virus (HIV) disease (H) Diagnosed .    Started on Atripla 09.       Perirectal abscess .    Responded to therapy with bactrim.     History reviewed. No pertinent family history.  Current Outpatient Medications   Medication Sig Dispense Refill     cetirizine (ZYRTEC) 10 MG tablet Take 1 tablet (10 mg) by mouth daily 1 tablet 0     GENVOYA [id number] MG PO TABS TAKE ONE TABLET BY MOUTH EVERY DAY WITH FOOD 90 tablet 3     Social History     Tobacco Use     Smoking status: Former Smoker     Years: 3.00     Types: Cigarettes     Last attempt to quit: 2000     Years since quittin.4     Smokeless tobacco: Never Used   Substance Use Topics     Alcohol use: Yes     Comment: 2 drinks week, usually beer       OBJECTIVE  /78   Pulse 90   Temp 97.9  F (36.6  C)   Resp 14   Ht 1.753 m (5' 9\")   Wt 74.4 kg (164 lb)   SpO2 98%   BMI 24.22 kg/m  "     Physical Exam  Vitals signs and nursing note reviewed.   Constitutional:       General: He is not in acute distress.  HENT:      Head: Normocephalic and atraumatic.      Right Ear: Tympanic membrane, ear canal and external ear normal.      Left Ear: Tympanic membrane, ear canal and external ear normal.      Nose: No congestion.      Right Sinus: No maxillary sinus tenderness or frontal sinus tenderness.      Left Sinus: No maxillary sinus tenderness or frontal sinus tenderness.      Mouth/Throat:      Mouth: Mucous membranes are moist.      Pharynx: No oropharyngeal exudate or posterior oropharyngeal erythema.   Eyes:      Conjunctiva/sclera: Conjunctivae normal.   Cardiovascular:      Rate and Rhythm: Normal rate and regular rhythm.      Heart sounds: No murmur.   Pulmonary:      Effort: Pulmonary effort is normal.      Breath sounds: No wheezing, rhonchi or rales.   Neurological:      Mental Status: He is alert.         X-Ray was not done.    ASSESSMENT:      ICD-10-CM    1. Nasal congestion R09.81 cetirizine (ZYRTEC) 10 MG tablet        Medical Decision Making:    Differential Diagnosis:  URI Adult/Peds:  Allergic rhinitis, Influenza and Viral syndrome    Serious Comorbid Conditions:  Adult:  HIV    PLAN:    URI Adult:  OTC decongestant/antihistamine    Followup:    If not improving or if condition worsens, follow up with your Primary Care Provider    Patient Instructions   1. Begin saline nasal wash to help with nasal discharge and congestion.   2. Begin Zyrtec 10 mg daily. This can be used up to two times daily, but it may cause drowsiness.   3. Follow up if you develop fevers, worsening symptoms, or no improvement after 4 more days.

## 2020-03-12 ENCOUNTER — TELEPHONE (OUTPATIENT)
Dept: PHARMACY | Facility: CLINIC | Age: 45
End: 2020-03-12

## 2020-03-12 NOTE — TELEPHONE ENCOUNTER
Called patient for refill reminder.    Will mail [employer] prescriptions address has been verified.     2 month of on time refill.    Last Filled on: 02/19/20   Follow-up Date: 04/15/20    Galina Babin CPhT  [facility]  [phone]

## 2020-03-31 ENCOUNTER — TELEPHONE (OUTPATIENT)
Dept: DERMATOLOGY | Facility: CLINIC | Age: 45
End: 2020-03-31

## 2020-03-31 NOTE — TELEPHONE ENCOUNTER
Called pt and LVM. I wanted to see if he would like his appointment on 4/9/20 to be either rescheduled, or changed to a phone or video follow up. Clinic number provided.  KACIE Nunez

## 2020-04-08 ENCOUNTER — TELEPHONE (OUTPATIENT)
Dept: PHARMACY | Facility: CLINIC | Age: 45
End: 2020-04-08

## 2020-04-08 NOTE — TELEPHONE ENCOUNTER
Attempted to contact the patient to for refill reminder call,  left message on voicemail    Last filled on: 03/13/20    Follow-up Date: 04/14/20 2nd attempt    Galina Babin CPhT  [facility]  [phone]

## 2020-04-14 NOTE — TELEPHONE ENCOUNTER
Called patient for refill reminder.    Will mail [employer]  prescriptions address has been verified.     3 month of on time refill.    Last Filled on: 03/13/20   Follow-up Date: 05/13/20    Galina Babin CPhT  [facility]  [phone]

## 2020-05-13 ENCOUNTER — TELEPHONE (OUTPATIENT)
Dept: PHARMACY | Facility: CLINIC | Age: 45
End: 2020-05-13

## 2020-05-13 NOTE — TELEPHONE ENCOUNTER
Setting call date for future fills.    Last filled: 04/27/2020    Next Call Date: 05/20/2020    Colton Vega  -----------------------------------------------   [email or web address]  Pharmacy Technician  [facility]: [phone]

## 2020-05-19 NOTE — TELEPHONE ENCOUNTER
Attempted to contact the patient to for refill reminder call,  left message on voicemail    Last filled on: 04/27/20    Follow-up Date: 05/27/20  3rd attempt    Galnia Babin CPhT  [facility]  [phone]

## 2020-05-22 NOTE — TELEPHONE ENCOUNTER
Called patient for refill reminder.    Will Mail prescriptions address has been verified.     4 month of on time refill.    Last Filled on: 04/27/20   Follow-up Date: 06/22/20    Galina Babin CPhT  [facility]  [phone]

## 2020-06-02 ENCOUNTER — MYC MEDICAL ADVICE (OUTPATIENT)
Dept: INFECTIOUS DISEASES | Facility: CLINIC | Age: 45
End: 2020-06-02

## 2020-06-02 DIAGNOSIS — B20 HUMAN IMMUNODEFICIENCY VIRUS (HIV) DISEASE (H): Primary | ICD-10-CM

## 2020-06-02 NOTE — TELEPHONE ENCOUNTER
Per [facility] Ambulatory Care Protocol, Pt is due for routine labs based on disease state or monitoring of medications. Lab orders entered per clinic protocol.  Serenity Martin RN

## 2020-06-04 DIAGNOSIS — B20 HUMAN IMMUNODEFICIENCY VIRUS (HIV) DISEASE (H): ICD-10-CM

## 2020-06-04 LAB
ALBUMIN SERPL-MCNC: 4 G/DL (ref 3.4–5)
ALP SERPL-CCNC: 66 U/L (ref 40–150)
ALT SERPL W P-5'-P-CCNC: 56 U/L (ref 0–70)
ANION GAP SERPL CALCULATED.3IONS-SCNC: 6 MMOL/L (ref 3–14)
AST SERPL W P-5'-P-CCNC: 25 U/L (ref 0–45)
BASOPHILS # BLD AUTO: 0 10E9/L (ref 0–0.2)
BASOPHILS NFR BLD AUTO: 0.3 %
BILIRUB SERPL-MCNC: 0.5 MG/DL (ref 0.2–1.3)
BUN SERPL-MCNC: 12 MG/DL (ref 7–30)
CALCIUM SERPL-MCNC: 8.6 MG/DL (ref 8.5–10.1)
CD3 CELLS # BLD: 1623 CELLS/UL (ref 603–2990)
CD3 CELLS NFR BLD: 79 % (ref 49–84)
CD3+CD4+ CELLS # BLD: 808 CELLS/UL (ref 441–2156)
CD3+CD4+ CELLS NFR BLD: 39 % (ref 28–63)
CD3+CD4+ CELLS/CD3+CD8+ CLL BLD: 1.18 % (ref 1.4–2.6)
CD3+CD8+ CELLS # BLD: 676 CELLS/UL (ref 125–1312)
CD3+CD8+ CELLS NFR BLD: 33 % (ref 10–40)
CHLORIDE SERPL-SCNC: 106 MMOL/L (ref 94–109)
CO2 SERPL-SCNC: 26 MMOL/L (ref 20–32)
CREAT SERPL-MCNC: 0.96 MG/DL (ref 0.66–1.25)
DIFFERENTIAL METHOD BLD: NORMAL
EOSINOPHIL # BLD AUTO: 0.1 10E9/L (ref 0–0.7)
EOSINOPHIL NFR BLD AUTO: 1.4 %
ERYTHROCYTE [DISTWIDTH] IN BLOOD BY AUTOMATED COUNT: 13.4 % (ref 10–15)
GFR SERPL CREATININE-BSD FRML MDRD: >90 ML/MIN/{1.73_M2}
GLUCOSE SERPL-MCNC: 134 MG/DL (ref 70–99)
HCT VFR BLD AUTO: 42 % (ref 40–53)
HGB BLD-MCNC: 14.1 G/DL (ref 13.3–17.7)
IFC SPECIMEN: ABNORMAL
IMM GRANULOCYTES # BLD: 0 10E9/L (ref 0–0.4)
IMM GRANULOCYTES NFR BLD: 0.5 %
LYMPHOCYTES # BLD AUTO: 2 10E9/L (ref 0.8–5.3)
LYMPHOCYTES NFR BLD AUTO: 34 %
MCH RBC QN AUTO: 28.5 PG (ref 26.5–33)
MCHC RBC AUTO-ENTMCNC: 33.6 G/DL (ref 31.5–36.5)
MCV RBC AUTO: 85 FL (ref 78–100)
MONOCYTES # BLD AUTO: 0.5 10E9/L (ref 0–1.3)
MONOCYTES NFR BLD AUTO: 8.1 %
NEUTROPHILS # BLD AUTO: 3.2 10E9/L (ref 1.6–8.3)
NEUTROPHILS NFR BLD AUTO: 55.7 %
NRBC # BLD AUTO: 0 10*3/UL
NRBC BLD AUTO-RTO: 0 /100
PLATELET # BLD AUTO: 219 10E9/L (ref 150–450)
POTASSIUM SERPL-SCNC: 3.6 MMOL/L (ref 3.4–5.3)
PROT SERPL-MCNC: 7.5 G/DL (ref 6.8–8.8)
RBC # BLD AUTO: 4.94 10E12/L (ref 4.4–5.9)
SODIUM SERPL-SCNC: 139 MMOL/L (ref 133–144)
T PALLIDUM AB SER QL: NONREACTIVE
WBC # BLD AUTO: 5.8 10E9/L (ref 4–11)

## 2020-06-11 ENCOUNTER — VIRTUAL VISIT (OUTPATIENT)
Dept: INFECTIOUS DISEASES | Facility: CLINIC | Age: 45
End: 2020-06-11
Attending: COLON & RECTAL SURGERY
Payer: COMMERCIAL

## 2020-06-11 DIAGNOSIS — B20 HUMAN IMMUNODEFICIENCY VIRUS (HIV) DISEASE (H): Primary | ICD-10-CM

## 2020-06-11 ASSESSMENT — PAIN SCALES - GENERAL: PAINLEVEL: NO PAIN (0)

## 2020-06-11 NOTE — LETTER
Date:Felipa 15, 2020      Patient was self referred, no letter generated. Do not send.        [facility] Information

## 2020-06-11 NOTE — PROGRESS NOTES
"Left message for patient to call me back in order to prepare for phone visit at 3:30 today.      Patient called back, med list reconciled, ready to go for visit today        Shan Rashid is a 45 year old male who is being evaluated via a billable telephone visit.      The patient has been notified of following:     \"This telephone visit will be conducted via a call between you and your physician/provider. We have found that certain health care needs can be provided without the need for a physical exam.  This service lets us provide the care you need with a short phone conversation.  If a prescription is necessary we can send it directly to your pharmacy.  If lab work is needed we can place an order for that and you can then stop by our lab to have the test done at a later time.    Telephone visits are billed at different rates depending on your insurance coverage. During this emergency period, for some insurers they may be billed the same as an in-person visit.  Please reach out to your insurance provider with any questions.    If during the course of the call the physician/provider feels a telephone visit is not appropriate, you will not be charged for this service.\"    Patient has given verbal consent for Telephone visit?  Yes    What phone number would you like to be contacted at? [phone]    How would you [facility]    Phone call duration: 10 minutes    Lisa Pemberton MD        "

## 2020-06-11 NOTE — PROGRESS NOTES
REASON FOR VISIT: Follow up for HIV.      HISTORY OF PRESENT ILLNESS:  Mr. Rashid is a very pleasant 45 year-old gentleman with HIV, doing well on therapy, presenting to clinic for follow up.  He was last seen one year ago. Since this time he has done well. He continues to live with his , who has been working from home due to covid-19. Shan has not been working since March but is getting unemployment and is happy having time for home improvements. He is adhering to covid-19 restrictions and has not had any recent illnesses.  He continues to do well on Genvoya - he denies issues with adherence and has no side effects.    PAST MEDICAL HISTORY:   1.  HIV diagnosed in 07/2009.  Started on Atripla 08/2009.  CD4 stanislav 234 (11%) in 07/2009. Switched to Genvoya 4/6/2017.  2.  Recurrent skin abscesses.   3.  History of chlamydia.   4.  History of depression, now resolved.   5.  GERD.      ALLERGIES:  No known drug allergies.      CURRENT MEDICATIONS:   1.  Genvoya.   2.  Multivitamin.      REVIEW OF SYSTEMS:  A 12-point review of systems was obtained, pertinent positives and negatives as above.      PHYSICAL EXAMINATION:   Pleasant, NAD  Breathing comfortably on RA  Speech fluent and appropriate      LABORATORY DATA:    6/4/2020: CBC and CMP unremarkable. Anti-trep Ab negative. VL undetectable, CD4 808.      ASSESSMENT AND PLAN:  Mr. Rashid is a very pleasant 45-year-old gentleman with HIV presenting for routine follow up.     1.  HIV. Continuing to do well on Genvoya.  No issues with adherence, most recent VL was undetectable and CD4 was >800.   - Continue Genvoya.   - See below for routine HIV care.       2.  Routine HIV care.        Prophylaxis:            a.  None indicated given CD4 count.          Routine Infectious disease screening:             a.  Hepatitis B immune.             b.  Hepatitis C negative in the past; denies risk factors.             c.  Toxo IgG negative.             d.  CMV IgG  positive.             e.  QuantiFERON Gold negative in 07/2009 with no risk factors for acquisition since.             f.   Declines STI screening today (monogamous relationship).       Vaccination status.             a.  Hepatitis A immune.            b.  Hepatitis B immune.             c.  Pneumovax given in 2009, booster 2015.             d.  Prevnar 07/2014.               e.  Will continue yearly influenza vaccination.             f.   Menactra series complete 2015, does not want booster (now low risk as he is monogamous).            g   Not interested in gardasil 9 (now approved up to age 45)       Cardiovascular and renal risk factors.               a.  Creatinine and fasting glucose have been within normal limits.             b.  Lipids with next blood draw.               c.  BP has been reasonable.             d.  He is a nonsmoker.               e.  He is a healthy weight and exercises regularly.          Cancer screening.            a.  Anal Pap negative 6/2019; will repeat once covid restrictions are eased.        Mr. Rashid will follow up in clinic in one year; he knows to contact me if issues arise prior.     Lisa Pemberton MD  [phone]

## 2020-06-11 NOTE — LETTER
6/11/2020       RE: Shan Rashid  [address]  [facility] [address]     Dear Colleague,    Thank you for referring your patient, Shan Rashid, to the [facility] AND INFECTIOUS DISEASES at [facility]. Please see a copy of my visit note below.    REASON FOR VISIT: Follow up for HIV.      HISTORY OF PRESENT ILLNESS:  Mr. Rashid is a very pleasant 45 year-old gentleman with HIV, doing well on therapy, presenting to clinic for follow up.  He was last seen one year ago. Since this time he has done well. He continues to live with his , who has been working from home due to covid-19. Shan has not been working since March but is getting unemployment and is happy having time for home improvements. He is adhering to covid-19 restrictions and has not had any recent illnesses.  He continues to do well on Genvoya - he denies issues with adherence and has no side effects.    PAST MEDICAL HISTORY:   1.  HIV diagnosed in 07/2009.  Started on Atripla 08/2009.  CD4 stanislav 234 (11%) in 07/2009. Switched to Genvoya 4/6/2017.  2.  Recurrent skin abscesses.   3.  History of chlamydia.   4.  History of depression, now resolved.   5.  GERD.      ALLERGIES:  No known drug allergies.      CURRENT MEDICATIONS:   1.  Genvoya.   2.  Multivitamin.      REVIEW OF SYSTEMS:  A 12-point review of systems was obtained, pertinent positives and negatives as above.      PHYSICAL EXAMINATION:   Pleasant, NAD  Breathing comfortably on RA  Speech fluent and appropriate      LABORATORY DATA:    6/4/2020: CBC and CMP unremarkable. Anti-trep Ab negative. VL undetectable, CD4 808.      ASSESSMENT AND PLAN:  Mr. Rashid is a very pleasant 45-year-old gentleman with HIV presenting for routine follow up.     1.  HIV. Continuing to do well on Genvoya.  No issues with adherence, most recent VL was undetectable and CD4 was >800.   - Continue Genvoya.   - See below for routine HIV care.      "  2.  Routine HIV care.        Prophylaxis:            a.  None indicated given CD4 count.          Routine Infectious disease screening:             a.  Hepatitis B immune.             b.  Hepatitis C negative in the past; denies risk factors.             c.  Toxo IgG negative.             d.  CMV IgG positive.             e.  QuantiFERON Gold negative in 07/2009 with no risk factors for acquisition since.             f.   Declines STI screening today (monogamous relationship).       Vaccination status.             a.  Hepatitis A immune.            b.  Hepatitis B immune.             c.  Pneumovax given in 2009, booster 2015.             d.  Prevnar 07/2014.               e.  Will continue yearly influenza vaccination.             f.   Menactra series complete 2015, does not want booster (now low risk as he is monogamous).            g   Not interested in gardasil 9 (now approved up to age 45)       Cardiovascular and renal risk factors.               a.  Creatinine and fasting glucose have been within normal limits.             b.  Lipids with next blood draw.               c.  BP has been reasonable.             d.  He is a nonsmoker.               e.  He is a healthy weight and exercises regularly.          Cancer screening.            a.  Anal Pap negative 6/2019; will repeat once covid restrictions are eased.        Mr. Rashid will follow up in clinic in one year; he knows to contact me if issues arise prior.     Lisa Pemberton MD  [phone]        Left message for patient to call me back in order to prepare for phone visit at 3:30 today.      Patient called back, med list reconciled, ready to go for visit today        Shan Rashid is a 45 year old male who is being evaluated via a billable telephone visit.      The patient has been notified of following:     \"This telephone visit will be conducted via a call between you and your physician/provider. We have found that certain health care needs can " "be provided without the need for a physical exam.  This service lets us provide the care you need with a short phone conversation.  If a prescription is necessary we can send it directly to your pharmacy.  If lab work is needed we can place an order for that and you can then stop by our lab to have the test done at a later time.    Telephone visits are billed at different rates depending on your insurance coverage. During this emergency period, for some insurers they may be billed the same as an in-person visit.  Please reach out to your insurance provider with any questions.    If during the course of the call the physician/provider feels a telephone visit is not appropriate, you will not be charged for this service.\"    Patient has given verbal consent for Telephone visit?  Yes    What phone number would you like to be contacted at? [phone]    How would you like to obtain your AVS? [facility]    Phone call duration: 10 minutes    Lisa Pemberton MD          Again, thank you for allowing me to participate in the care of your patient.      Sincerely,    Lisa Pemberton MD      "

## 2020-06-19 ENCOUNTER — OFFICE VISIT (OUTPATIENT)
Dept: URGENT CARE | Facility: URGENT CARE | Age: 45
End: 2020-06-19
Payer: COMMERCIAL

## 2020-06-19 VITALS
TEMPERATURE: 98.2 F | SYSTOLIC BLOOD PRESSURE: 118 MMHG | RESPIRATION RATE: 12 BRPM | HEART RATE: 80 BPM | WEIGHT: 160 LBS | BODY MASS INDEX: 24.25 KG/M2 | DIASTOLIC BLOOD PRESSURE: 66 MMHG | HEIGHT: 68 IN

## 2020-06-19 DIAGNOSIS — H10.32 ACUTE BACTERIAL CONJUNCTIVITIS OF LEFT EYE: Primary | ICD-10-CM

## 2020-06-19 PROCEDURE — 99213 OFFICE O/P EST LOW 20 MIN: CPT | Performed by: FAMILY MEDICINE

## 2020-06-19 RX ORDER — POLYMYXIN B SULFATE AND TRIMETHOPRIM 1; 10000 MG/ML; [USP'U]/ML
1-2 SOLUTION OPHTHALMIC EVERY 4 HOURS
Qty: 10 ML | Refills: 0 | Status: SHIPPED | OUTPATIENT
Start: 2020-06-19 | End: 2022-04-06

## 2020-06-19 ASSESSMENT — MIFFLIN-ST. JEOR: SCORE: 1590.26

## 2020-06-19 NOTE — PROGRESS NOTES
Subjective: Left eye has been itchy and a little painful the last 2 days and in the mornings he has a lot of crusty matter.  Vision is fine.  He has not been around anybody at all lately.    Objective: Left lower lid is moderately red and there is some conjunctival injection as well.  There may be a small red stye area forming in the left upper lid.  No adenopathy.    Assessment and plan: Left eye conjunctivitis and he may be forming a stye and they may be interconnected.  Will treat with antibiotic eyedrops but there may be a small bump that forms that could take several weeks to go away.

## 2020-06-23 ENCOUNTER — TELEPHONE (OUTPATIENT)
Dept: PHARMACY | Facility: CLINIC | Age: 45
End: 2020-06-23

## 2020-06-23 NOTE — TELEPHONE ENCOUNTER
Called patient for refill reminder.    Will mail [employer] prescriptions address has been verified.     5 month of on time refill.    Last Filled on: 05/26/20   Follow-up Date: 07/20/20    Galina Babin CPhT  [facility]  [phone]

## 2020-07-20 ENCOUNTER — TELEPHONE (OUTPATIENT)
Dept: PHARMACY | Facility: CLINIC | Age: 45
End: 2020-07-20

## 2020-07-20 NOTE — TELEPHONE ENCOUNTER
Attempted to contact the patient to for refill reminder call,  left message on voicemail    Last filled on: 06/23/20    Follow-up Date: 07/24/20 2nd attempt    Galina Babin CPhT  [facility]  [phone]

## 2020-08-20 ENCOUNTER — TELEPHONE (OUTPATIENT)
Dept: PHARMACY | Facility: CLINIC | Age: 45
End: 2020-08-20

## 2020-08-20 DIAGNOSIS — B20 HUMAN IMMUNODEFICIENCY VIRUS (HIV) DISEASE (H): ICD-10-CM

## 2020-08-20 RX ORDER — ELVITEGRAVIR, COBICISTAT, EMTRICITABINE, AND TENOFOVIR ALAFENAMIDE 150; 150; 200; 10 MG/1; MG/1; MG/1; MG/1
TABLET ORAL
Qty: 90 TABLET | Refills: 2 | Status: SHIPPED | OUTPATIENT
Start: 2020-08-20 | End: 2021-06-23

## 2020-08-20 NOTE — TELEPHONE ENCOUNTER
Attempted to contact the patient to for refill reminder call,  left message on voicemail    Last filled on: 07/20/20    Follow-up Date: 08/26/20 2nd attempt    Galina Babin CPhT  [facility]  [phone]

## 2020-08-26 ENCOUNTER — TELEPHONE (OUTPATIENT)
Dept: PHARMACY | Facility: CLINIC | Age: 45
End: 2020-08-26

## 2020-08-26 ENCOUNTER — OFFICE VISIT (OUTPATIENT)
Dept: URGENT CARE | Facility: URGENT CARE | Age: 45
End: 2020-08-26
Payer: COMMERCIAL

## 2020-08-26 VITALS
SYSTOLIC BLOOD PRESSURE: 120 MMHG | TEMPERATURE: 98.5 F | BODY MASS INDEX: 23.99 KG/M2 | WEIGHT: 162 LBS | RESPIRATION RATE: 12 BRPM | DIASTOLIC BLOOD PRESSURE: 82 MMHG | HEIGHT: 69 IN | HEART RATE: 100 BPM

## 2020-08-26 DIAGNOSIS — H00.014 HORDEOLUM EXTERNUM LEFT UPPER EYELID: Primary | ICD-10-CM

## 2020-08-26 PROCEDURE — 99213 OFFICE O/P EST LOW 20 MIN: CPT | Performed by: INTERNAL MEDICINE

## 2020-08-26 RX ORDER — ERYTHROMYCIN 5 MG/G
0.5 OINTMENT OPHTHALMIC 4 TIMES DAILY
Qty: 3.5 G | Refills: 1 | Status: SHIPPED | OUTPATIENT
Start: 2020-08-26 | End: 2020-09-02

## 2020-08-26 ASSESSMENT — MIFFLIN-ST. JEOR: SCORE: 1615.21

## 2020-08-26 NOTE — PROGRESS NOTES
"SUBJECTIVE:   Shan Rashid is a 45 year old male presenting with a chief complaint of   Chief Complaint   Patient presents with     Urgent Care     Eye Problem     concern of stye left upper lid.        He is an established patient of [facility].    Eye Problem    Onset of symptoms was 2 month(s) ago.   Location: left eye   Started with infection, treatment abx eye  Recurring for 1 week    Current and Associated symptoms: eyelid redness, swelling and lump noted  Some crusting in morning  Treatment measures tried include none  Context: wearing glasses instead of contacts      Review of Systems    Past Medical History:   Diagnosis Date     Depression      Human immunodeficiency virus (HIV) disease (H) Diagnosed .    Started on Atripla 09.       Perirectal abscess .    Responded to therapy with bactrim.     History reviewed. No pertinent family history.  Current Outpatient Medications   Medication Sig Dispense Refill     erythromycin (ROMYCIN) 5 MG/GM ophthalmic ointment Place 0.5 inches Into the left eye 4 times daily for 7 days 3.5 g 1     GENVOYA [phone] MG PO TABS TAKE ONE TABLET BY MOUTH EVERY DAY WITH FOOD 90 tablet 2     cetirizine (ZYRTEC) 10 MG tablet Take 1 tablet (10 mg) by mouth daily (Patient not taking: Reported on 2020) 1 tablet 0     trimethoprim-polymyxin b (POLYTRIM) 71940-7.1 UNIT/ML-% ophthalmic solution Place 1-2 drops Into the left eye every 4 hours 10 mL 0     Social History     Tobacco Use     Smoking status: Former Smoker     Years: 3.00     Types: Cigarettes     Last attempt to quit: 2000     Years since quittin.9     Smokeless tobacco: Never Used   Substance Use Topics     Alcohol use: Yes     Comment: 2 drinks week, usually beer       OBJECTIVE  /82   Pulse 100   Temp 98.5  F (36.9  C) (Oral)   Resp 12   Ht 1.753 m (5' 9\")   Wt 73.5 kg (162 lb)   BMI 23.92 kg/m      Physical Exam  Vitals signs reviewed.   Constitutional:       " Appearance: Normal appearance.   Eyes:      Extraocular Movements: Extraocular movements intact.      Conjunctiva/sclera: Conjunctivae normal.      Comments: left upper eye lid swelling lateral area with 2 external white heads & 1 internal white head - all 3 same area   Neurological:      Mental Status: He is alert.         Labs:  No results found for this or any previous visit (from the past 24 hour(s)).        ASSESSMENT:      ICD-10-CM    1. Hordeolum externum left upper eyelid  H00.014 erythromycin (ROMYCIN) 5 MG/GM ophthalmic ointment          Patient Instructions   Erythomycin ointment 4-6 x day  Warm compresses.    See Ophthalmology of persistent          Patient Education     Sty (or Stye)  A sty is an infection of the oil gland of the eyelid. It may develop into a small pocket of pus (an abscess). This can cause pain, redness, and swelling. In early stages, a sty is treated with antibiotic cream, eye drops, or a small towel soaked in warm water (a warm compress). More severe cases may need to be opened and drained by a healthcare provider.  Home care    Eye drops or ointment are usually prescribed to treat the infection. Use these as directed.     Artificial tears may also be used to lubricate the eye and make it more comfortable. You can buy these over the counter without a prescription. Talk with your healthcare provider before using any over-the-counter treatment for a sty.    Apply a warm, damp towel to the affected eye for at least 5 minutes, 3 to 4 times a day for a week. Warm compresses open the pores and speed the healing. But if the compresses are too hot, they may burn your eyelid.    Sometimes the sty will drain with this treatment alone. If this happens, keep using the antibiotic until all the redness and swelling are gone.    Wash your hands before and after touching the infected eyelid to avoid spreading the infection.    Don t squeeze or try to break open the sty.  Follow-up care  Follow up  with your healthcare provider, or as advised.   When to seek medical advice  Call your healthcare provider right away if any of these occur:    Increase in swelling or redness around the eyelid after 48 to 72 hours    Increase in eye pain or the eyelid blisters    Increase in warmth--the eyelid feels hot    Drainage of blood or thick pus from the sty    Blister on the eyelid    Inability to open the eyelid due to swelling    Fever of 100.4 F (38 C) or above, or as directed by your provider    Vision changes    Headache or stiff neck    The sty comes back  Date Last Reviewed: 8/1/2017 2000-2019 [employer]. [address] [place] [id number]. All rights reserved. This information is not intended as a substitute for professional medical care. Always follow your healthcare professional's instructions.

## 2020-08-26 NOTE — PATIENT INSTRUCTIONS
Erythomycin ointment 4-6 x day  Warm compresses.    See Ophthalmology of persistent          Patient Education     Sty (or Stye)  A sty is an infection of the oil gland of the eyelid. It may develop into a small pocket of pus (an abscess). This can cause pain, redness, and swelling. In early stages, a sty is treated with antibiotic cream, eye drops, or a small towel soaked in warm water (a warm compress). More severe cases may need to be opened and drained by a healthcare provider.  Home care    Eye drops or ointment are usually prescribed to treat the infection. Use these as directed.     Artificial tears may also be used to lubricate the eye and make it more comfortable. You can buy these over the counter without a prescription. Talk with your healthcare provider before using any over-the-counter treatment for a sty.    Apply a warm, damp towel to the affected eye for at least 5 minutes, 3 to 4 times a day for a week. Warm compresses open the pores and speed the healing. But if the compresses are too hot, they may burn your eyelid.    Sometimes the sty will drain with this treatment alone. If this happens, keep using the antibiotic until all the redness and swelling are gone.    Wash your hands before and after touching the infected eyelid to avoid spreading the infection.    Don t squeeze or try to break open the sty.  Follow-up care  Follow up with your healthcare provider, or as advised.   When to seek medical advice  Call your healthcare provider right away if any of these occur:    Increase in swelling or redness around the eyelid after 48 to 72 hours    Increase in eye pain or the eyelid blisters    Increase in warmth--the eyelid feels hot    Drainage of blood or thick pus from the sty    Blister on the eyelid    Inability to open the eyelid due to swelling    Fever of 100.4 F (38 C) or above, or as directed by your provider    Vision changes    Headache or stiff neck    The sty comes back  Date Last  Reviewed: 8/1/2017 2000-2019 [employer]. [address] [place] [address], [place] [address]. All rights reserved. This information is not intended as a substitute for professional medical care. Always follow your healthcare professional's instructions.

## 2020-08-26 NOTE — TELEPHONE ENCOUNTER
Attempted to contact the patient to for refill reminder call,  left message on voicemail    Last filled on: 07/20/2020    Follow-up Date: 09/02/2020

## 2020-09-24 ENCOUNTER — TELEPHONE (OUTPATIENT)
Dept: PHARMACY | Facility: CLINIC | Age: 45
End: 2020-09-24

## 2020-09-24 NOTE — TELEPHONE ENCOUNTER
Attempted to contact the patient to for refill reminder call,  left message on voicemail    Last filled on: 08/31/20    Follow-up Date: 09/30/20 2nd attempt    Galina Babin CPhT  [facility]  [phone]

## 2020-09-25 NOTE — TELEPHONE ENCOUNTER
Pt called back.   Will mail [employer] prescriptions address has been verified.     Last Filled on: 08/31/20   Follow-up Date: 10/26/20    Galina Babin CPhT  [facility]  [phone]

## 2020-10-26 ENCOUNTER — TELEPHONE (OUTPATIENT)
Dept: PHARMACY | Facility: CLINIC | Age: 45
End: 2020-10-26

## 2020-10-26 NOTE — TELEPHONE ENCOUNTER
Attempted to contact the patient to for refill reminder call,  left message on voicemail    Last filled on: 09/24/20    Follow-up Date: 10/30/20 2nd attempt    Galina Babin CPhT  [facility]  [phone]

## 2020-10-30 NOTE — TELEPHONE ENCOUNTER
Called patient for refill reminder.    Will mail [employer] prescriptions address has been verified.     Last Filled on: 09/24/20   Follow-up Date: 11/24/20    Galina Babin CPhT  [facility]  [phone]

## 2020-11-17 ENCOUNTER — MYC MEDICAL ADVICE (OUTPATIENT)
Dept: INFECTIOUS DISEASES | Facility: CLINIC | Age: 45
End: 2020-11-17

## 2020-11-17 DIAGNOSIS — L73.9 FOLLICULITIS: ICD-10-CM

## 2020-11-18 RX ORDER — SULFAMETHOXAZOLE/TRIMETHOPRIM 800-160 MG
1 TABLET ORAL 2 TIMES DAILY
Qty: 14 TABLET | Refills: 0 | Status: SHIPPED | OUTPATIENT
Start: 2020-11-18 | End: 2020-11-25

## 2020-11-18 NOTE — TELEPHONE ENCOUNTER
Per msg from elgin Wilson to order 7 day supply of Bactrim DS PO BID for folliculitis.  Serenity Martin RN

## 2020-11-25 ENCOUNTER — TELEPHONE (OUTPATIENT)
Dept: PHARMACY | Facility: CLINIC | Age: 45
End: 2020-11-25

## 2020-11-25 NOTE — TELEPHONE ENCOUNTER
Attempted to contact the patient to for refill reminder call,  left message on voicemail    Last filled on: 10/30/20    Follow-up Date: 12/01/20 2nd attempt    Galina Babin CPhT  [facility]  [phone]

## 2020-12-02 NOTE — TELEPHONE ENCOUNTER
Attempted to contact the patient to for refill reminder call,  left message on voicemail    Last filled on: 10/30/20    Follow-up Date: 12/09/20 3rd attempt    Galina Babin CPhT  [facility]  [phone]

## 2020-12-13 ENCOUNTER — HEALTH MAINTENANCE LETTER (OUTPATIENT)
Age: 45
End: 2020-12-13

## 2021-01-04 ENCOUNTER — TELEPHONE (OUTPATIENT)
Dept: PHARMACY | Facility: CLINIC | Age: 46
End: 2021-01-04

## 2021-01-04 NOTE — TELEPHONE ENCOUNTER
Attempted to contact the patient to for refill reminder call,  left message on voicemail    Last filled on: 12/07/20    Follow-up Date: 01/08/21 2nd attempt    Galina Babin CPhT  [facility]  [phone]

## 2021-02-02 ENCOUNTER — TELEPHONE (OUTPATIENT)
Dept: PHARMACY | Facility: CLINIC | Age: 46
End: 2021-02-02

## 2021-02-02 NOTE — TELEPHONE ENCOUNTER
Attempted to contact the patient to for refill reminder call,  left message on voicemail    Last filled on: 01/04/21    Follow-up Date: 02/08/21 2nd attempt    Galina Babin CPhT  [facility]  [phone]

## 2021-03-05 ENCOUNTER — TELEPHONE (OUTPATIENT)
Dept: PHARMACY | Facility: CLINIC | Age: 46
End: 2021-03-05

## 2021-03-05 NOTE — TELEPHONE ENCOUNTER
Attempted to contact the patient to for refill reminder call,  left message on voicemail    Last filled on: 02/02/21    Follow-up Date: 03/11/21 2nd attempt    Galina Babin CPhT  [facility]  [phone]

## 2021-03-26 ENCOUNTER — IMMUNIZATION (OUTPATIENT)
Dept: NURSING | Facility: CLINIC | Age: 46
End: 2021-03-26
Payer: COMMERCIAL

## 2021-03-26 PROCEDURE — 0001A PR COVID VAC PFIZER DIL RECON 30 MCG/0.3 ML IM: CPT

## 2021-03-26 PROCEDURE — 91300 PR COVID VAC PFIZER DIL RECON 30 MCG/0.3 ML IM: CPT

## 2021-04-12 ENCOUNTER — TELEPHONE (OUTPATIENT)
Dept: PHARMACY | Facility: CLINIC | Age: 46
End: 2021-04-12

## 2021-04-12 NOTE — TELEPHONE ENCOUNTER
Attempted to contact the patient to for refill reminder call,  left message on voicemail    Last filled on: 03/13/21    Follow-up Date: 04/16/21 2nd attempt     Galina Babin CPhT  [facility]  [phone]

## 2021-04-16 ENCOUNTER — IMMUNIZATION (OUTPATIENT)
Dept: NURSING | Facility: CLINIC | Age: 46
End: 2021-04-16
Attending: INTERNAL MEDICINE
Payer: COMMERCIAL

## 2021-04-16 PROCEDURE — 0002A PR COVID VAC PFIZER DIL RECON 30 MCG/0.3 ML IM: CPT

## 2021-04-16 PROCEDURE — 91300 PR COVID VAC PFIZER DIL RECON 30 MCG/0.3 ML IM: CPT

## 2021-04-17 ENCOUNTER — HEALTH MAINTENANCE LETTER (OUTPATIENT)
Age: 46
End: 2021-04-17

## 2021-05-13 ENCOUNTER — TELEPHONE (OUTPATIENT)
Dept: PHARMACY | Facility: CLINIC | Age: 46
End: 2021-05-13

## 2021-05-13 NOTE — TELEPHONE ENCOUNTER
Attempted to contact the patient to for refill reminder call,  left message on voicemail    Last filled on: 04/15/21    Follow-up Date: 05/18/21 2nd attempt    Galina Babin CPhT  [facility]  [phone]

## 2021-05-17 ENCOUNTER — HOSPITAL ENCOUNTER (EMERGENCY)
Facility: CLINIC | Age: 46
Discharge: HOME | End: 2021-05-17
Attending: EMERGENCY MEDICINE | Admitting: EMERGENCY MEDICINE
Payer: COMMERCIAL

## 2021-05-17 ENCOUNTER — APPOINTMENT (OUTPATIENT)
Dept: CT IMAGING | Facility: CLINIC | Age: 46
End: 2021-05-17
Attending: EMERGENCY MEDICINE
Payer: COMMERCIAL

## 2021-05-17 VITALS
OXYGEN SATURATION: 98 % | SYSTOLIC BLOOD PRESSURE: 132 MMHG | RESPIRATION RATE: 20 BRPM | DIASTOLIC BLOOD PRESSURE: 91 MMHG | WEIGHT: 167 LBS | TEMPERATURE: 99.2 F | BODY MASS INDEX: 24.66 KG/M2 | HEART RATE: 94 BPM

## 2021-05-17 DIAGNOSIS — R11.0 NAUSEA: ICD-10-CM

## 2021-05-17 DIAGNOSIS — R55 VASOVAGAL SYNCOPE: ICD-10-CM

## 2021-05-17 DIAGNOSIS — G44.319 ACUTE POST-TRAUMATIC HEADACHE, NOT INTRACTABLE: ICD-10-CM

## 2021-05-17 PROCEDURE — 70450 CT HEAD/BRAIN W/O DYE: CPT

## 2021-05-17 PROCEDURE — 93005 ELECTROCARDIOGRAM TRACING: CPT | Performed by: EMERGENCY MEDICINE

## 2021-05-17 PROCEDURE — 99285 EMERGENCY DEPT VISIT HI MDM: CPT | Mod: 25 | Performed by: EMERGENCY MEDICINE

## 2021-05-17 PROCEDURE — 99284 EMERGENCY DEPT VISIT MOD MDM: CPT | Mod: 25 | Performed by: EMERGENCY MEDICINE

## 2021-05-17 PROCEDURE — 93010 ELECTROCARDIOGRAM REPORT: CPT | Performed by: EMERGENCY MEDICINE

## 2021-05-17 NOTE — ED PROVIDER NOTES
History     Chief Complaint   Patient presents with     Headache     Late Tuesday night went to the BR did not feel good; sat down on the toilet and fell forward/hit his head on the floor. Mild headache when he awakes up in am and nausea comes/goes.     HPI  Shan Rashid is a 46 year old male with a past medical history of depression, HIV, perirectal abscess who presents to the emergency department with a chief complaint of headache.  The patient reports that late Tuesday night he went to the bathroom and did not feel well.  He states that after he urinated, he felt that he had to have a bowel movement and moved to sit down.  He sat down on the toilet and passed out, falling forward, hitting his head on the floor.  He also had some nausea and vomited once.  He believes he passed out prior to the fall as he does not remember the fall itself.  He states that his  heard the fall and reported that he was not unconscious for very long.  No seizure activity was noted.  The patient denies any neck pain or other injuries other than the head trauma.  He had some bruising to his forehead that has since resolved.  He has had a mild headache when he wakes up in the morning since then.  He states that he took ibuprofen for this 1 day, but as it typically is mild and resolves on its own, he did not take anything today.  The headache is now completely resolved.  This is associated with nausea that comes and goes.  No associated fevers..  The patient is not on any blood thinners.  He denies any chest pain or shortness of breath associated with a syncopal episode.  He denies any history of cardiac problems in himself or his family.  He denies having had any leg pain or swelling recently.  He has never had a syncopal episode in the past.  He has never had a concussion in the past.  The patient's GI symptoms have resolved.  He denies any numbness, weakness, tingling, difficulty with speech or ambulation.    I have  reviewed the Medications, Allergies, Past Medical and Surgical History, and Social History in the [facility] system.    Past Medical History:   Diagnosis Date     Depression      Human immunodeficiency virus (HIV) disease (H) Diagnosed .    Started on Atripla 09.       Perirectal abscess .    Responded to therapy with bactrim.     History reviewed. No pertinent surgical history.  No current facility-administered medications for this encounter.      Current Outpatient Medications   Medication     GENVOYA [id number] MG PO TABS     cetirizine (ZYRTEC) 10 MG tablet     trimethoprim-polymyxin b (POLYTRIM) 31075-8.1 UNIT/ML-% ophthalmic solution     Allergies   Allergen Reactions     Nkda [No Known Drug Allergies]      Past medical history, past surgical history, medications, and allergies were reviewed with the patient. Additional pertinent items: None    Social History     Socioeconomic History     Marital status:      Spouse name: Not on file     Number of children: Not on file     Years of education: Not on file     Highest education level: Not on file   Occupational History     Not on file   Social Needs     Financial resource strain: Not on file     Food insecurity     Worry: Not on file     Inability: Not on file     Transportation needs     Medical: Not on file     Non-medical: Not on file   Tobacco Use     Smoking status: Former Smoker     Years: 3.00     Types: Cigarettes     Quit date: 2000     Years since quittin.6     Smokeless tobacco: Never Used   Substance and Sexual Activity     Alcohol use: Yes     Comment: 2 drinks week, usually beer     Drug use: No     Sexual activity: Yes     Partners: Male     Birth control/protection: Condom   Lifestyle     Physical activity     Days per week: Not on file     Minutes per session: Not on file     Stress: Not on file   Relationships     Social connections     Talks on phone: Not on file     Gets together: Not on file      Attends Judaism service: Not on file     Active member of club or organization: Not on file     Attends meetings of clubs or organizations: Not on file     Relationship status: Not on file     Intimate partner violence     Fear of current or ex partner: Not on file     Emotionally abused: Not on file     Physically abused: Not on file     Forced sexual activity: Not on file   Other Topics Concern     Parent/sibling w/ CABG, MI or angioplasty before 65F 55M? Not Asked   Social History Narrative    Patient is , in a committed monogamous relationship with his .      Social history was reviewed with the patient. Additional pertinent items: None    Review of Systems  General: No fevers or chills  Skin: No rash or diaphoresis, positive for forehead bruise, now resolved  Eyes: No eye redness or discharge  Ears/Nose/Throat: No rhinorrhea or nasal congestion  Respiratory: No cough or SOB  Cardiovascular: No chest pain or palpitations, positive for syncopal episode  Gastrointestinal: positive for nausea and vomiting, now resolved  Genitourinary: No urinary frequency, hematuria, or dysuria  Musculoskeletal: No arthralgias or myalgias  Neurologic: No numbness, tingling, or weakness, no difficulty with speech or ambulation.  No vision or hearing changes reported, positive for mild headache (currently resolved)  Hematologic/Lymphatic/Immunologic: No leg swelling, no easy bruising/bleeding  Endocrine: No polyuria/polydypsia    A complete review of systems was performed with pertinent positives and negatives noted in the HPI, and all other systems negative.    Physical Exam   BP: (!) 132/91  Pulse: 94  Temp: 99.2  F (37.3  C)  Resp: 20  Weight: 75.8 kg (167 lb)  SpO2: 98 %      General: Well nourished, well developed, NAD  HEENT: EOMI, anicteric. NCAT, MMM  Neck: no jugular venous distension, supple, nl ROM  Cardiac: Regular rate and rhythm. No murmurs, rubs, or gallops. Normal S1, S2.  Intact peripheral  pulses  Pulm: airway patent, NLB  Skin: Warm and dry to the touch.  No rash, no laceration or ecchymosis  Extremities: No LE edema, no cyanosis, w/w/p  Neuro: Alert and oriented x 4, no facial droop, CN II-XII intact, strength 5/5 all 4 extremities, sensation intact throughout, steady gait, no nystagmus, coordination normal as tested on finger-to-nose, heel-to-shin, and rapid alternating movements. PERRL, EOMI.  No pronator drift, no lower extremity drift.  Speech is clear without aphasia or dysarthria.    ED Course        Procedures             EKG Interpretation:      Interpreted by Charlene Ryan MD  Time reviewed: 1935  Symptoms at time of EKG: none, previous syncope   Rhythm: normal sinus   Rate: normal  Axis: normal  Ectopy: none  Conduction: normal  ST Segments/ T Waves: No ST-T wave changes  Q Waves: none  Comparison to prior: No old EKG available    Clinical Impression: normal EKG                        Labs Ordered and Resulted from Time of ED Arrival Up to the Time of Departure from the ED - No data to display         Results for orders placed or performed during the hospital encounter of 05/17/21 (from the past 24 hour(s))   Head CT w/o contrast    Narrative    CT SCAN OF THE HEAD WITHOUT CONTRAST   5/17/2021 6:33 PM     HISTORY: Head trauma, moderate-severe.    TECHNIQUE:  Axial images of the head and coronal reformations without  IV contrast material. Radiation dose for this scan was reduced using  automated exposure control, adjustment of the mA and/or kV according  to patient size, or iterative reconstruction technique.    COMPARISON: None.    FINDINGS: There is no evidence of intracranial hemorrhage, mass, acute  infarct or anomaly. The ventricles are normal in size, shape and  configuration. The brain parenchyma and subarachnoid spaces are  normal.     The visualized portions of the sinuses and mastoids appear normal. The  bony calvarium and bones of the skull base appear intact.        Impression    IMPRESSION:   No evidence of acute intracranial hemorrhage, mass, or  herniation.      LAISHA VEGA MD       Labs, vital signs, and imaging studies were reviewed by me.    Medications - No data to display    Assessments & Plan (with Medical Decision Making)   Shan Rashid is a 46 year old male who presents with headaches after head trauma that occurred after syncopal episode.  Syncopal episode sounds vasovagal, but differential diagnosis would also include orthostatic hypotension/dehydration/electrolyte abnormality, cardiac arrhythmia, neurogenic syncope, syncope related to head trauma.  CT ordered to rule out acute skull fracture or intracranial injury.  EKG ordered to evaluate for cardiac arrhythmia.    CT shows no acute disease process.    EKG is normal.    I have reviewed the nursing notes.    I have reviewed the findings, diagnosis, plan and need for follow up with the patient.    Patient to be discharged home. Advised to follow up with PCP as scheduled. To return to ER immediately with any new/worsening symptoms, particularly recurrent syncopal episode, chest pain/shortness of breath, or new neurological symptoms such as numbness, weakness, tingling, difficulty with speech or ambulation, vision changes, etc. Plan of care discussed with patient who expresses understanding and agrees with plan of care.      New Prescriptions    No medications on file       Final diagnoses:   Acute post-traumatic headache, not intractable   Nausea     Charlene Ryan MD  5/17/2021   [facility] EMERGENCY DEPARTMENT     Charlene Ryan MD  05/17/21 1944

## 2021-05-18 ENCOUNTER — TELEPHONE (OUTPATIENT)
Dept: PHARMACY | Facility: CLINIC | Age: 46
End: 2021-05-18

## 2021-05-18 LAB — INTERPRETATION ECG - MUSE: NORMAL

## 2021-05-18 NOTE — TELEPHONE ENCOUNTER
Called patient for refill reminder.    Will mail 1 prescriptions address has been verified.   30 day supply       1 month of on time refill.    Last Filled on:04/15/2021   Follow-up Date: 06/17/2021    Thank You,  Lupillo Pacheco, Student Pharmacist  [facility]

## 2021-05-18 NOTE — DISCHARGE INSTRUCTIONS
TODAY'S VISIT:  You were seen today for headache    Other Instructions:   -  Avoid screen time (TV, phone, computer) as much as possible while your concussion heals  - Avoid contact sports/activities in which there is a high chance of repeat head injury until your symptoms resolve and you are cleared by a physician as a second head trauma before your concussion heals could increase the risk of more severe concussion or second impact syndrome, which can be fatal  - Return to the Emergency Department at any time for any new or worsening symptoms or any concerns, particularly any vision changes, severe headaches, nausea with vomiting, new numbness, weakness, tingling in your arms/legs or difficulty with speech or ambulation.    -   - If you had any labs or imaging/radiology tests performed today, you should also discuss these tests with your usual provider.     FOLLOW-UP:  Please make an appointment to follow up with:  - Your Primary Care Provider. If you do not have a PCP, please call the Primary Care Center (phone: [phone] for an appointment    - Have your provider review the results from today's visit with you again to make sure no further follow-up or additional testing is needed based on those results.     RETURN TO THE EMERGENCY DEPARTMENT  Return to the Emergency Department at any time for any new or worsening symptoms or any concerns.

## 2021-05-26 DIAGNOSIS — B20 HUMAN IMMUNODEFICIENCY VIRUS (HIV) DISEASE (H): ICD-10-CM

## 2021-05-26 LAB
ALBUMIN SERPL-MCNC: 4 G/DL (ref 3.4–5)
ALP SERPL-CCNC: 64 U/L (ref 40–150)
ALT SERPL W P-5'-P-CCNC: 46 U/L (ref 0–70)
ANION GAP SERPL CALCULATED.3IONS-SCNC: 5 MMOL/L (ref 3–14)
AST SERPL W P-5'-P-CCNC: 20 U/L (ref 0–45)
BASOPHILS # BLD AUTO: 0 10E9/L (ref 0–0.2)
BASOPHILS NFR BLD AUTO: 0.4 %
BILIRUB SERPL-MCNC: 0.2 MG/DL (ref 0.2–1.3)
BUN SERPL-MCNC: 13 MG/DL (ref 7–30)
CALCIUM SERPL-MCNC: 8.3 MG/DL (ref 8.5–10.1)
CHLORIDE SERPL-SCNC: 106 MMOL/L (ref 94–109)
CO2 SERPL-SCNC: 28 MMOL/L (ref 20–32)
CREAT SERPL-MCNC: 1.05 MG/DL (ref 0.66–1.25)
DIFFERENTIAL METHOD BLD: ABNORMAL
EOSINOPHIL # BLD AUTO: 0.1 10E9/L (ref 0–0.7)
EOSINOPHIL NFR BLD AUTO: 2.2 %
ERYTHROCYTE [DISTWIDTH] IN BLOOD BY AUTOMATED COUNT: 13 % (ref 10–15)
GFR SERPL CREATININE-BSD FRML MDRD: 85 ML/MIN/{1.73_M2}
GLUCOSE SERPL-MCNC: 105 MG/DL (ref 70–99)
HCT VFR BLD AUTO: 38.8 % (ref 40–53)
HGB BLD-MCNC: 13.4 G/DL (ref 13.3–17.7)
IMM GRANULOCYTES # BLD: 0 10E9/L (ref 0–0.4)
IMM GRANULOCYTES NFR BLD: 0.2 %
LYMPHOCYTES # BLD AUTO: 1.9 10E9/L (ref 0.8–5.3)
LYMPHOCYTES NFR BLD AUTO: 35.2 %
MCH RBC QN AUTO: 28.9 PG (ref 26.5–33)
MCHC RBC AUTO-ENTMCNC: 34.5 G/DL (ref 31.5–36.5)
MCV RBC AUTO: 84 FL (ref 78–100)
MONOCYTES # BLD AUTO: 0.5 10E9/L (ref 0–1.3)
MONOCYTES NFR BLD AUTO: 9.3 %
NEUTROPHILS # BLD AUTO: 2.9 10E9/L (ref 1.6–8.3)
NEUTROPHILS NFR BLD AUTO: 52.7 %
NRBC # BLD AUTO: 0 10*3/UL
NRBC BLD AUTO-RTO: 0 /100
PLATELET # BLD AUTO: 223 10E9/L (ref 150–450)
POTASSIUM SERPL-SCNC: 3.7 MMOL/L (ref 3.4–5.3)
PROT SERPL-MCNC: 7.3 G/DL (ref 6.8–8.8)
RBC # BLD AUTO: 4.63 10E12/L (ref 4.4–5.9)
SODIUM SERPL-SCNC: 140 MMOL/L (ref 133–144)
T PALLIDUM AB SER QL: NONREACTIVE
WBC # BLD AUTO: 5.5 10E9/L (ref 4–11)

## 2021-05-26 PROCEDURE — 86359 T CELLS TOTAL COUNT: CPT | Mod: 90 | Performed by: PATHOLOGY

## 2021-05-26 PROCEDURE — 36415 COLL VENOUS BLD VENIPUNCTURE: CPT | Performed by: PATHOLOGY

## 2021-05-26 PROCEDURE — 99000 SPECIMEN HANDLING OFFICE-LAB: CPT | Performed by: PATHOLOGY

## 2021-05-26 PROCEDURE — 86780 TREPONEMA PALLIDUM: CPT | Mod: 90 | Performed by: PATHOLOGY

## 2021-05-26 PROCEDURE — 85025 COMPLETE CBC W/AUTO DIFF WBC: CPT | Performed by: PATHOLOGY

## 2021-05-26 PROCEDURE — 87536 HIV-1 QUANT&REVRSE TRNSCRPJ: CPT | Mod: 90 | Performed by: PATHOLOGY

## 2021-05-26 PROCEDURE — 86360 T CELL ABSOLUTE COUNT/RATIO: CPT | Mod: 90 | Performed by: PATHOLOGY

## 2021-05-26 PROCEDURE — 80053 COMPREHEN METABOLIC PANEL: CPT | Performed by: PATHOLOGY

## 2021-05-27 LAB
CD3 CELLS # BLD: 2023 CELLS/UL (ref 603–2990)
CD3 CELLS NFR BLD: 79 % (ref 49–84)
CD3+CD4+ CELLS # BLD: 1035 CELLS/UL (ref 441–2156)
CD3+CD4+ CELLS NFR BLD: 41 % (ref 28–63)
CD3+CD4+ CELLS/CD3+CD8+ CLL BLD: 1.14 % (ref 1.4–2.6)
CD3+CD8+ CELLS # BLD: 913 CELLS/UL (ref 125–1312)
CD3+CD8+ CELLS NFR BLD: 36 % (ref 10–40)
HIV1 RNA # PLAS NAA DL=20: NORMAL {COPIES}/ML
HIV1 RNA SERPL NAA+PROBE-LOG#: NORMAL {LOG_COPIES}/ML
IFC SPECIMEN: ABNORMAL

## 2021-06-02 ENCOUNTER — OFFICE VISIT (OUTPATIENT)
Dept: INFECTIOUS DISEASES | Facility: CLINIC | Age: 46
End: 2021-06-02
Attending: INTERNAL MEDICINE
Payer: COMMERCIAL

## 2021-06-02 VITALS
DIASTOLIC BLOOD PRESSURE: 84 MMHG | OXYGEN SATURATION: 99 % | SYSTOLIC BLOOD PRESSURE: 155 MMHG | HEART RATE: 78 BPM | BODY MASS INDEX: 24.56 KG/M2 | WEIGHT: 166.3 LBS

## 2021-06-02 DIAGNOSIS — B20 HUMAN IMMUNODEFICIENCY VIRUS (HIV) DISEASE (H): Primary | ICD-10-CM

## 2021-06-02 PROCEDURE — G0463 HOSPITAL OUTPT CLINIC VISIT: HCPCS

## 2021-06-02 PROCEDURE — 90471 IMMUNIZATION ADMIN: CPT | Performed by: INTERNAL MEDICINE

## 2021-06-02 PROCEDURE — 250N000011 HC RX IP 250 OP 636: Performed by: INTERNAL MEDICINE

## 2021-06-02 PROCEDURE — 90715 TDAP VACCINE 7 YRS/> IM: CPT | Performed by: INTERNAL MEDICINE

## 2021-06-02 PROCEDURE — 99214 OFFICE O/P EST MOD 30 MIN: CPT | Performed by: INTERNAL MEDICINE

## 2021-06-02 RX ADMIN — TETANUS TOXOID, REDUCED DIPHTHERIA TOXOID AND ACELLULAR PERTUSSIS VACCINE, ADSORBED 0.5 ML: 5; 2.5; 8; 8; 2.5 SUSPENSION INTRAMUSCULAR at 13:58

## 2021-06-02 ASSESSMENT — PAIN SCALES - GENERAL: PAINLEVEL: NO PAIN (0)

## 2021-06-02 NOTE — PROGRESS NOTES
Shan Rashid is here today for HIV care follow up.      Subjective       HPI:  Shan is a 46 year old male with PMH including HIV (diagnosed in 07/2009 with CD4 stanislav 234 (11%) at that time, started on Atripla 08/2009, then switched to Genvoya 4/6/2017; well controlled on ART; previously followed with Dr. Pemberton), GERD, history of recurrent skin abscesses including prior perirectal abscess (s/p Bactrim course in 2010), history of depression (now resolved), history of chlamydia (2011), who presents to ID clinic for follow up of HIV care.    Per patient things have been going well from an HIV standpoint. He has been on Genvoya for few years and well controlled and says he misses at most 1-2 doses per month. Otherwise he reports he has had off/on issues with tonsils but not recently. No known contacts with TB. He did get fully vaccinated with COVID vaccine without any side effects. Never had known COVID infection. Used to exercise more before COVID pandemic when he was going to the gym, but has not been doing as much recently other than walking.     He did sustain a head concussion few weeks ago but head CT scan was reportedly ok at that time. He says it occurred in middle of night when fell and hit head on floor when getting up to go to the bathroom. He says he had headaches for week or two afterward which have resolved since then. No blurry vision. No preceding symptoms. No known history of seizures.  was present who didn't notice any other symptoms at that time.    On ROS - no fevers/chills, no night sweats, no SOB, no cough, no dysphagia, no sore throat, no nausea/vomiting, no diarrhea, no rashes, no penile lesion or discharge.        HIV past medical history:  Diagnosed: 07/2009  Risk factors: MSM   Stanislav CD4:  234 (11%) in 07/2009  Genotypes: 7/16/09 HIV genotype/phenotype: no clinically relevant resistance mutations reported  Treatment history:  Started on Atripla 08/2009.  CD4 stanislav 234 (11%)  in 2009. Switched to Genvoya 2017.  Prior OIs:  None known  Supports:      ART Adherence:  Current regimen: Genvoya 1 tab daily  Missed doses in last week, month:  1-2/month  Estimates adherence at:  99%  Medication side effects:  Never with Genvoya or Atripla  OTC medications: multivitamin    Social history:  Background: Originally from [place] ([place], now lives in [place])  Lives in/with:    Employment:  Works for [employer]  Education:   Interests:   International travel: none recently -- last visited 's parents in [place]   Pets: none  Alcohol: occasional weekends  Tobacco:  Never regular smoker  Other substances:  never  Sexual Hx:  History of STI diagnosis and treatment: history of chlamydia  In a monogamous relationship with  -- serodiscordant ( is on PREP)      PAST MEDICAL HISTORY  Past Medical History:   Diagnosis Date     Depression      Human immunodeficiency virus (HIV) disease (H) Diagnosed .    Started on Atripla 09.       Perirectal abscess .    Responded to therapy with bactrim.   Otherwise as per HPI    PAST SURGICAL HISTORY  Denies history of surgeries  Otherwise as per HPI    ALLERGIES AND DRUG REACTIONS  Allergies   Allergen Reactions     Nkda [No Known Drug Allergies]        FAMILY HISTORY  No known immunocompromising conditions or infections unless listed below  Mother with diabetes    SOCIAL AND FUNCTIONAL HISTORY  Social History     Tobacco Use     Smoking status: Former Smoker     Years: 3.00     Types: Cigarettes     Quit date: 2000     Years since quittin.7     Smokeless tobacco: Never Used   Substance Use Topics     Alcohol use: Yes     Comment: 2 drinks week, usually beer     Drug use: No     Socioeconomic History     Marital status:      Spouse name: Not on file     Number of children: Not on file     Years of education: Not on file     Highest education level: Not on  file   As per HPI    CURRENT ANTIBIOTICS  Other medications reviewed in EPIC  Genvoya 1 tab daily      REVIEW OF SYSTEMS  Full 10 point ROS obtained, pertinent positives and negatives as above.      Objective   PHYSICAL EXAMINATION     weight is 75.4 kg (166 lb 4.8 oz). His blood pressure is 155/84 (abnormal) and his pulse is 78. His oxygen saturation is 99%.     Constitutional:  appearance not in distress, appears comfortable, cooperative  Eyes: no conjunctival injection, no scleral icterus  ENT: no nasal discharge, membranes moist, oropharynx pink and without exudates or thrush; mildly enlarged tonsils wihtout exudates  Cardiovascular: regular rate and rhythm  Pulmonary: unlabored breathing, clear to ascultation bilaterally  Gastrointestinal: abdomen non-distended, soft, non-tender  Musculoskeletal: normal bulk and tone  Lymphatic: no significant LAD   Extremities: warm and well perfused  Skin: no rashes  Neurologic: awake, alert and responsive, moving all extremities  Psychiatric: normal judgment/insight, normal memory, normal mood/affect      Other Significant Information (Labs, cultures, radiology, etc)    Recent micro:   7/7/09 HIV VL: 462,100  7/7/09 CD4 count: 234 (11%)  7/7/09 HBsAb: positive  7/7/09 HCV ab: negative  7/7/09 HAV ab: negative  7/7/09 CMV IgG ab: positive  7/7/09 toxo IgG ab: negative  7/7/09 syphilis ab: negative  7/16/09 quantiferon: negative  7/16/09 HIV genotype/phenotype: no clinically relevant resistance mutations reported  12/2/11 urine chlamydia: positive  4/7/16 HAV IgG ab: reactive  11/2/17 GC throat PCR: negative  12/22/17 urine chlamydia/gonorrhea: negtive  6/6/19 anal pap: negative cytology  6/4/20 Syphilis ab: negative  6/4/20 HIV VL: undetectable  6/4/20 CD4 count: 808 (39%)   5/26/21 Syphilis ab: negative  5/26/21 HIV VL: undetecable  5/26/21 CD4 count: 1035 (41%)        Assessment & Plan/Recommendations     Shan is a 46 year old male with PMH including HIV (diagnosed in  07/2009 with CD4 stanislav 234 (11%) at that time, started on Atripla 08/2009, then switched to Genvoya 4/6/2017; well controlled on ART; previously followed with Dr. Pemberton), GERD, history of recurrent skin abscesses including prior perirectal abscess (s/p Bactrim course in 2010), history of depression (now resolved), history of chlamydia (2011), who presents to ID clinic for follow up of HIV care.    1. HIV  - already had routine HIV maintenance labs drawn before this visit which we reviewed together  - will consider full set of screening labs next visit  - has declined repeating gonorrhea/chlamydia, HCV, quantiferon screenings or meningococcal vaccine boosters since 2017 because he's low risk now in a monogamous relationship with   - continue Genvoya 1 tab daily-- has been well controlled on this regimen; could consider discussing newer regimen (eg BIC/TAF/FTC) options next visit if patient is interested  - follow up in 3-4 months for repeat labs and to address any other due vaccines/lab screening at that time      2.  Health maintenance  - administer Tdap booster this visit; consider further due booster vaccines next visit  - recommended he establish care with PCP for remaining health maintenance management including blood pressure management and other routine issues/screenings      Colonoscopy: never  GC/Chlamydia: CT urine positive 12/2/11; last 12/22/17 negative CT/GC screen  Syphilis: last 5/26/21 negative   Quantiferon: last negative 7/16/09  G6PD: unknown  HLA-: unknown  Toxo: 7/7/09 negative  Crypto (if CD4<50): unknown  CMV: 7/16/09 IgG positive   Hep A : 4/7/16 reactive  Hep B: 7/7/09 HBsAb reactive  Hep C: 7/7/09 negative  Hep A vaccine: 5/6/10  Tdap vaccine: unknown, due today  Flu vaccine: last 11/20/15, due  Prevnar-13 vaccine: last 07/2014  Pneumovax-23 vaccine: given in 2009, booster 2015; last booster due  Meningococcal MenACWY (Menveo, Menactra) vaccine: 8/13/15, 11/20/15, booster  due (but declined because low risk as is monogamous)  HPV vaccine: unknown  MMR vaccine: unknown  COVID: Pfizer 3/26/21, 4/16/21      I communicated with the patient at this visit.  Patient was seen on 06/02/21 in ID clinic.    30 minutes spent on the date of the encounter doing chart review, history and exam, documentation and further activities per the note    Bharathi Porter MD  Infectious Diseases

## 2021-06-02 NOTE — NURSING NOTE
Chief Complaint   Patient presents with     RECHECK     B20 former patient of ROCÍO Gottlieb         BP (!) 155/84 (BP Location: Right arm, Patient Position: Sitting, Cuff Size: Adult Regular)   Pulse 78   Wt 75.4 kg (166 lb 4.8 oz)   SpO2 99%   BMI 24.56 kg/m          Robbie Martínez, EMT

## 2021-06-14 ENCOUNTER — TELEPHONE (OUTPATIENT)
Dept: INFECTIOUS DISEASES | Facility: CLINIC | Age: 46
End: 2021-06-14

## 2021-06-16 ENCOUNTER — TELEPHONE (OUTPATIENT)
Dept: PHARMACY | Facility: CLINIC | Age: 46
End: 2021-06-16

## 2021-06-16 DIAGNOSIS — B20 HUMAN IMMUNODEFICIENCY VIRUS (HIV) DISEASE (H): ICD-10-CM

## 2021-06-16 NOTE — TELEPHONE ENCOUNTER
Attempted to contact the patient to for refill reminder call,  left message on voicemail    Last filled on: 05/17/21    Follow-up Date: 06/24/21 2nd attempt    Galina Babin CPhT  [facility]  [phone]

## 2021-06-23 DIAGNOSIS — B20 HUMAN IMMUNODEFICIENCY VIRUS (HIV) DISEASE (H): ICD-10-CM

## 2021-06-23 RX ORDER — ELVITEGRAVIR, COBICISTAT, EMTRICITABINE, AND TENOFOVIR ALAFENAMIDE 150; 150; 200; 10 MG/1; MG/1; MG/1; MG/1
1 TABLET ORAL DAILY
Qty: 90 TABLET | Refills: 11 | Status: SHIPPED | OUTPATIENT
Start: 2021-06-23 | End: 2022-04-06

## 2021-06-23 NOTE — TELEPHONE ENCOUNTER
Pt called back to order refills.   Will mail Genvoya prescription when refilled by the clinic. The  address has been verified.   30 day supply       Last Filled on: 05/17/21   Follow-up Date: 07/15/21    Galina Babin CPhT  [facility]  [phone]

## 2021-06-24 RX ORDER — ELVITEGRAVIR, COBICISTAT, EMTRICITABINE, AND TENOFOVIR ALAFENAMIDE 150; 150; 200; 10 MG/1; MG/1; MG/1; MG/1
TABLET ORAL
Qty: 90 TABLET | Refills: 3 | Status: SHIPPED | OUTPATIENT
Start: 2021-06-24 | End: 2022-04-06

## 2021-06-30 ENCOUNTER — TELEPHONE (OUTPATIENT)
Dept: INFECTIOUS DISEASES | Facility: CLINIC | Age: 46
End: 2021-06-30

## 2021-06-30 NOTE — TELEPHONE ENCOUNTER
----- Message from UDAY Casillas sent at 6/23/2021  4:19 PM CDT -----  Please contact pt and schedule rtn B20 Alejandro Allne

## 2021-07-15 ENCOUNTER — TELEPHONE (OUTPATIENT)
Dept: PHARMACY | Facility: CLINIC | Age: 46
End: 2021-07-15

## 2021-07-15 NOTE — TELEPHONE ENCOUNTER
Called patient for refill reminder.    [facility] mail [employer] prescriptions address has been verified.   30 day supply       Last Filled on:   06/23/21  Follow-up Date: 08/16/21    Galina Babin CPhT  [facility]  [phone]

## 2021-08-12 ENCOUNTER — TELEPHONE (OUTPATIENT)
Dept: INFECTIOUS DISEASES | Facility: CLINIC | Age: 46
End: 2021-08-12

## 2021-08-12 NOTE — TELEPHONE ENCOUNTER
Attempted to contact the patient to for refill reminder call,  left message on cell phone.    Last filled on: 7/16/2021    Follow-up Date: 8/17/2021    ---  Shantanu Cruz  Pharmacy Intern  [facility], CSC/U Discharge

## 2021-08-19 ENCOUNTER — TELEPHONE (OUTPATIENT)
Dept: PHARMACY | Facility: CLINIC | Age: 46
End: 2021-08-19

## 2021-08-19 NOTE — TELEPHONE ENCOUNTER
Attempted to contact the patient to for refill reminder call,    Last filled on: 07/16/2021    Follow-up Date: 08/23/2021

## 2021-09-26 ENCOUNTER — HEALTH MAINTENANCE LETTER (OUTPATIENT)
Age: 46
End: 2021-09-26

## 2021-11-09 ENCOUNTER — IMMUNIZATION (OUTPATIENT)
Dept: NURSING | Facility: CLINIC | Age: 46
End: 2021-11-09
Payer: COMMERCIAL

## 2021-11-09 PROCEDURE — 0004A PR COVID VAC PFIZER DIL RECON 30 MCG/0.3 ML IM: CPT

## 2021-11-09 PROCEDURE — 91300 PR COVID VAC PFIZER DIL RECON 30 MCG/0.3 ML IM: CPT

## 2021-12-29 ENCOUNTER — TELEPHONE (OUTPATIENT)
Dept: INFECTIOUS DISEASES | Facility: CLINIC | Age: 46
End: 2021-12-29
Payer: COMMERCIAL

## 2021-12-29 NOTE — TELEPHONE ENCOUNTER
Called patient for refill reminder.    Will mail Genvoya prescription, address has been verified.   30 day supply       4 months of on time refill.    Last Filled on: 11/30/2021   Follow-up Date: 1/26/2021    ---  Shantanu Cruz  Pharmacy Intern  [facility], CSC/U Discharge

## 2022-03-28 ENCOUNTER — LAB (OUTPATIENT)
Dept: LAB | Facility: CLINIC | Age: 47
End: 2022-03-28
Attending: INTERNAL MEDICINE
Payer: COMMERCIAL

## 2022-03-28 DIAGNOSIS — B20 HUMAN IMMUNODEFICIENCY VIRUS (HIV) DISEASE (H): ICD-10-CM

## 2022-03-28 LAB
ALBUMIN SERPL-MCNC: 4.2 G/DL (ref 3.4–5)
ALP SERPL-CCNC: 71 U/L (ref 40–150)
ALT SERPL W P-5'-P-CCNC: 49 U/L (ref 0–70)
ANION GAP SERPL CALCULATED.3IONS-SCNC: 6 MMOL/L (ref 3–14)
AST SERPL W P-5'-P-CCNC: 27 U/L (ref 0–45)
BASOPHILS # BLD AUTO: 0 10E3/UL (ref 0–0.2)
BASOPHILS NFR BLD AUTO: 0 %
BILIRUB SERPL-MCNC: 0.2 MG/DL (ref 0.2–1.3)
BUN SERPL-MCNC: 20 MG/DL (ref 7–30)
CALCIUM SERPL-MCNC: 9 MG/DL (ref 8.5–10.1)
CHLORIDE BLD-SCNC: 105 MMOL/L (ref 94–109)
CO2 SERPL-SCNC: 28 MMOL/L (ref 20–32)
CREAT SERPL-MCNC: 1.03 MG/DL (ref 0.66–1.25)
EOSINOPHIL # BLD AUTO: 0.1 10E3/UL (ref 0–0.7)
EOSINOPHIL NFR BLD AUTO: 2 %
ERYTHROCYTE [DISTWIDTH] IN BLOOD BY AUTOMATED COUNT: 13.1 % (ref 10–15)
GFR SERPL CREATININE-BSD FRML MDRD: >90 ML/MIN/1.73M2
GLUCOSE BLD-MCNC: 86 MG/DL (ref 70–99)
HCT VFR BLD AUTO: 42.9 % (ref 40–53)
HGB BLD-MCNC: 14.3 G/DL (ref 13.3–17.7)
IMM GRANULOCYTES # BLD: 0 10E3/UL
IMM GRANULOCYTES NFR BLD: 0 %
LYMPHOCYTES # BLD AUTO: 2.4 10E3/UL (ref 0.8–5.3)
LYMPHOCYTES NFR BLD AUTO: 38 %
MCH RBC QN AUTO: 28 PG (ref 26.5–33)
MCHC RBC AUTO-ENTMCNC: 33.3 G/DL (ref 31.5–36.5)
MCV RBC AUTO: 84 FL (ref 78–100)
MONOCYTES # BLD AUTO: 0.7 10E3/UL (ref 0–1.3)
MONOCYTES NFR BLD AUTO: 11 %
NEUTROPHILS # BLD AUTO: 3 10E3/UL (ref 1.6–8.3)
NEUTROPHILS NFR BLD AUTO: 49 %
NRBC # BLD AUTO: 0 10E3/UL
NRBC BLD AUTO-RTO: 0 /100
PLATELET # BLD AUTO: 272 10E3/UL (ref 150–450)
POTASSIUM BLD-SCNC: 4.1 MMOL/L (ref 3.4–5.3)
PROT SERPL-MCNC: 7.4 G/DL (ref 6.8–8.8)
RBC # BLD AUTO: 5.11 10E6/UL (ref 4.4–5.9)
SODIUM SERPL-SCNC: 139 MMOL/L (ref 133–144)
WBC # BLD AUTO: 6.2 10E3/UL (ref 4–11)

## 2022-03-28 PROCEDURE — 87536 HIV-1 QUANT&REVRSE TRNSCRPJ: CPT | Mod: 90 | Performed by: PATHOLOGY

## 2022-03-28 PROCEDURE — 80053 COMPREHEN METABOLIC PANEL: CPT | Mod: 90 | Performed by: PATHOLOGY

## 2022-03-28 PROCEDURE — 85025 COMPLETE CBC W/AUTO DIFF WBC: CPT | Performed by: PATHOLOGY

## 2022-03-28 PROCEDURE — 86360 T CELL ABSOLUTE COUNT/RATIO: CPT | Mod: 90 | Performed by: PATHOLOGY

## 2022-03-28 PROCEDURE — 99000 SPECIMEN HANDLING OFFICE-LAB: CPT | Performed by: PATHOLOGY

## 2022-03-28 PROCEDURE — 36415 COLL VENOUS BLD VENIPUNCTURE: CPT | Performed by: PATHOLOGY

## 2022-03-28 PROCEDURE — 86359 T CELLS TOTAL COUNT: CPT | Mod: 90 | Performed by: PATHOLOGY

## 2022-03-28 PROCEDURE — 86780 TREPONEMA PALLIDUM: CPT | Mod: 90 | Performed by: PATHOLOGY

## 2022-03-29 LAB
CD3 CELLS # BLD: 2236 CELLS/UL (ref 603–2990)
CD3 CELLS NFR BLD: 81 % (ref 49–84)
CD3+CD4+ CELLS # BLD: 1219 CELLS/UL (ref 441–2156)
CD3+CD4+ CELLS NFR BLD: 44 % (ref 28–63)
CD3+CD4+ CELLS/CD3+CD8+ CLL BLD: 1.35 % (ref 1.4–2.6)
CD3+CD8+ CELLS # BLD: 905 CELLS/UL (ref 125–1312)
CD3+CD8+ CELLS NFR BLD: 33 % (ref 10–40)
HIV1 RNA # PLAS NAA DL=20: NOT DETECTED COPIES/ML
T CELL COMMENT: ABNORMAL
T PALLIDUM AB SER QL: NONREACTIVE

## 2022-04-06 ENCOUNTER — OFFICE VISIT (OUTPATIENT)
Dept: INFECTIOUS DISEASES | Facility: CLINIC | Age: 47
End: 2022-04-06
Attending: INTERNAL MEDICINE
Payer: COMMERCIAL

## 2022-04-06 VITALS
OXYGEN SATURATION: 99 % | BODY MASS INDEX: 25.56 KG/M2 | SYSTOLIC BLOOD PRESSURE: 145 MMHG | HEART RATE: 75 BPM | WEIGHT: 173.1 LBS | DIASTOLIC BLOOD PRESSURE: 94 MMHG

## 2022-04-06 DIAGNOSIS — Z00.00 HEALTHCARE MAINTENANCE: ICD-10-CM

## 2022-04-06 DIAGNOSIS — B20 HUMAN IMMUNODEFICIENCY VIRUS (HIV) DISEASE (H): Primary | ICD-10-CM

## 2022-04-06 PROCEDURE — 90471 IMMUNIZATION ADMIN: CPT | Performed by: INTERNAL MEDICINE

## 2022-04-06 PROCEDURE — 250N000021 HC RX MED A9270 GY (STAT IND- M) 250: Performed by: INTERNAL MEDICINE

## 2022-04-06 PROCEDURE — 99214 OFFICE O/P EST MOD 30 MIN: CPT | Performed by: INTERNAL MEDICINE

## 2022-04-06 PROCEDURE — 90750 HZV VACC RECOMBINANT IM: CPT | Performed by: INTERNAL MEDICINE

## 2022-04-06 RX ORDER — ELVITEGRAVIR, COBICISTAT, EMTRICITABINE, AND TENOFOVIR ALAFENAMIDE 150; 150; 200; 10 MG/1; MG/1; MG/1; MG/1
1 TABLET ORAL DAILY
Qty: 30 TABLET | Refills: 5 | Status: SHIPPED | OUTPATIENT
Start: 2022-04-06 | End: 2023-01-24

## 2022-04-06 RX ADMIN — ZOSTER VACCINE RECOMBINANT, ADJUVANTED 0.5 ML: KIT at 16:35

## 2022-04-06 NOTE — PROGRESS NOTES
Shan Rashid is here today for HIV care follow up.      Assessment & Plan/Recommendations     Shna is a 46 year old male with PMH including HIV (diagnosed in 07/2009 with CD4 stanislav 234 (11%) at that time, started on Atripla 08/2009, then switched to Genvoya 4/6/2017; well controlled on ART; previously followed with Dr. Pemberton), GERD, history of skin abscesses and prior perirectal abscess (s/p Bactrim course in 2010), history of depression (now resolved), history of chlamydia (2011), who presents to ID clinic for follow up of HIV care.      1. HIV, asymptomatic  - well controlled on antiretroviral therapy; continue Genvoya 1 tab daily-- will refill today; we also discussed today newer regimen options such as Biktarvy, but patient not interested in changing from Genvoya this visit but will consider discussing further at next visit  - already had routine HIV maintenance labs drawn before this visit which we reviewed together  - will order routine screening labs for next visit  - has declined repeating gonorrhea/chlamydia, HCV, quantiferon screenings or meningococcal vaccine boosters since 2017 since he says he's low risk now in a monogamous relationship with   - follow up in 4-6 months for repeat labs and to address any other due vaccines/lab screenings at that time      2.  Health maintenance  - administered Shingrix first dose this visit; consider second dose Shingrix and further due booster vaccines next visit  - recommended he establish care with PCP for remaining health maintenance management including blood pressure management and other routine issues/screenings - patient says he will work on this prior to next visit now that he's found one that he's interested in seeing; also suggested that patient monitor his BP at home to see if it is elevated there as it has been high on last few clinic visits with us; also consider lipid screening at future visit       Colonoscopy: never  GC/Chlamydia: CT  urine positive 12/2/11; last 12/22/17 negative CT/GC screen  Syphilis: last 5/26/21 negative   Quantiferon: last negative 7/16/09  G6PD: unknown  HLA-: unknown  Toxo: 7/7/09 negative  Crypto (if CD4<50): unknown  CMV: 7/16/09 IgG positive   Hep A : 4/7/16 reactive  Hep B: 7/7/09 HBsAb reactive  Hep C: 7/7/09 negative  Hep A vaccine: 5/6/10  Tdap vaccine: 6/2/2021  Flu vaccine: last 11/20/15, due  Prevnar-13 vaccine: last 07/2014  Pneumovax-23 vaccine: given in 2009, booster 2015; booster due  Meningococcal MenACWY (Menveo, Menactra) vaccine: 8/13/15, 11/20/15, booster due (but declined because low risk as is monogamous)  HPV vaccine: declined previously  MMR vaccine: unknown  COVID: Pfizer 3/26/21, 4/16/21, 11/9/21   Shingrix: 4/6/22      I communicated with the patient at this visit.  Patient was seen on 4/6/22 in ID clinic.    30 minutes spent on the date of the encounter doing chart review, history and exam, documentation and further activities per the note    Bharathi Porter MD  Infectious Diseases      Subjective       HPI Interval events/updates:  4/6/22 update:  Since last visit, doing well. Had COVID over HILDA - got tired, brain fog, breathing was ok (never got SOB), unclear exposure, asymptomatic for few months now; has been vaccinated x3. Still on Genvoya, only missing ~1/month (takes every morning). No fevers/chills, no SOB, no nausea/vomiting, no diarrhea. Is interested in Shingrix booster this visit; also in possible future second COVID booster.      HIV past medical history:  Diagnosed: 07/2009  Risk factors: MSM   Arjun CD4:  234 (11%) in 07/2009  Genotypes: 7/16/09 HIV genotype/phenotype: no clinically relevant resistance mutations reported  Treatment history:  Started on Atripla 08/2009.  CD4 arjun 234 (11%) in 07/2009. Switched to Genvoya 4/6/2017.  Prior OIs:  None known  Supports:      ART Adherence:  Current regimen: Genvoya 1 tab daily  Missed doses in last week, month:   ~1x/month  Estimates adherence at:  99%  Medication side effects:  Never with Genvoya or Atripla  OTC medications: multivitamin    Social history:  Background: Originally from [place] ([place], now lives in [place])  Lives in/with:    Employment:  Works for [employer] -- [employer]  Education:   Interests:   International travel: none recently -- last visited 's parents in [place]   Pets: none  Alcohol: occasional weekends  Tobacco:  Never regular smoker  Other substances:  never  Sexual Hx:  History of STI diagnosis and treatment: history of chlamydia  In a monogamous relationship with  -- serodiscordant ( has been on PrEP)      PAST MEDICAL HISTORY  Past Medical History:   Diagnosis Date     Depression      Human immunodeficiency virus (HIV) disease (H) Diagnosed .    Started on Atripla 09.       Perirectal abscess .    Responded to therapy with bactrim.   Otherwise as per HPI    PAST SURGICAL HISTORY  Denies history of surgeries  Otherwise as per HPI    ALLERGIES AND DRUG REACTIONS  Allergies   Allergen Reactions     Nkda [No Known Drug Allergies]        FAMILY HISTORY  No known immunocompromising conditions or infections unless listed below  Mother with diabetes    SOCIAL AND FUNCTIONAL HISTORY  Social History     Tobacco Use     Smoking status: Former Smoker     Years: 3.00     Types: Cigarettes     Quit date: 2000     Years since quittin.5     Smokeless tobacco: Never Used   Substance Use Topics     Alcohol use: Yes     Comment: 2 drinks week, usually beer     Drug use: No     Socioeconomic History     Marital status:    As per HPI    CURRENT ANTIBIOTICS  Other medications reviewed in EPIC  Genvoya 1 tab daily      REVIEW OF SYSTEMS  Full 10 point ROS obtained, pertinent positives and negatives as above.      Objective   PHYSICAL EXAMINATION     weight is 78.5 kg (173 lb 1.6 oz). His blood pressure is 145/94 (abnormal)  and his pulse is 75. His oxygen saturation is 99%.     Constitutional:  appearance not in distress, appears comfortable, cooperative  Eyes: no conjunctival injection, no scleral icterus  ENT: no nasal discharge, membranes moist, oropharynx pink and without exudates or thrush   Cardiovascular: regular rate and rhythm  Pulmonary: unlabored breathing, clear to ascultation bilaterally  Gastrointestinal: abdomen non-distended, soft, non-tender  Musculoskeletal: normal bulk and tone  Lymphatic: no significant LAD   Extremities: warm and well perfused  Skin: no rashes  Neurologic: awake, alert and responsive, moving all extremities  Psychiatric: normal judgment/insight, normal memory, normal mood/affect      Other Significant Information (Labs, cultures, radiology, etc)    Recent micro:   7/7/09 HIV VL: 462,100  7/7/09 CD4 count: 234 (11%)  7/7/09 HBsAb: positive  7/7/09 HCV ab: negative  7/7/09 HAV ab: negative  7/7/09 CMV IgG ab: positive  7/7/09 toxo IgG ab: negative  7/7/09 syphilis ab: negative  7/16/09 quantiferon: negative  7/16/09 HIV genotype/phenotype: no clinically relevant resistance mutations reported  12/2/11 urine chlamydia: positive  4/7/16 HAV IgG ab: reactive  11/2/17 GC throat PCR: negative  12/22/17 urine chlamydia/gonorrhea: negtive  6/6/19 anal pap: negative cytology  6/4/20 Syphilis ab: negative  6/4/20 HIV VL: undetectable  6/4/20 CD4 count: 808 (39%)   5/26/21 Syphilis ab: negative  5/26/21 HIV VL: undetecable  5/26/21 CD4 count: 1035 (41%)  3/28/22 syphilis screen: negative  3/28/22 HIV VL: undetectable  3/28/22 CD4 count 1219 (44%)

## 2022-04-06 NOTE — LETTER
4/6/2022       RE: Shan Rashid  [address]  [place] [address]     Dear Colleague,    Thank you for referring your patient, Shan Rashid, to the [facility] [place] [facility]. Please see a copy of my visit note below.    Shan Rashid is here today for HIV care follow up.      Assessment & Plan/Recommendations     Shan is a 46 year old male with PMH including HIV (diagnosed in 07/2009 with CD4 stanislav 234 (11%) at that time, started on Atripla 08/2009, then switched to Genvoya 4/6/2017; well controlled on ART; previously followed with Dr. Pemberton), GERD, history of skin abscesses and prior perirectal abscess (s/p Bactrim course in 2010), history of depression (now resolved), history of chlamydia (2011), who presents to ID clinic for follow up of HIV care.      1. HIV, asymptomatic  - well controlled on antiretroviral therapy; continue Genvoya 1 tab daily-- will refill today; we also discussed today newer regimen options such as Biktarvy, but patient not interested in changing from Genvoya this visit but will consider discussing further at next visit  - already had routine HIV maintenance labs drawn before this visit which we reviewed together  - will order routine screening labs for next visit  - has declined repeating gonorrhea/chlamydia, HCV, quantiferon screenings or meningococcal vaccine boosters since 2017 since he says he's low risk now in a monogamous relationship with   - follow up in 4-6 months for repeat labs and to address any other due vaccines/lab screenings at that time      2.  Health maintenance  - administered Shingrix first dose this visit; consider second dose Shingrix and further due booster vaccines next visit  - recommended he establish care with PCP for remaining health maintenance management including blood pressure management and other routine issues/screenings - patient  says he will work on this prior to next visit now that he's found one that he's interested in seeing; also suggested that patient monitor his BP at home to see if it is elevated there as it has been high on last few clinic visits with us; also consider lipid screening at future visit       Colonoscopy: never  GC/Chlamydia: CT urine positive 12/2/11; last 12/22/17 negative CT/GC screen  Syphilis: last 5/26/21 negative   Quantiferon: last negative 7/16/09  G6PD: unknown  HLA-: unknown  Toxo: 7/7/09 negative  Crypto (if CD4<50): unknown  CMV: 7/16/09 IgG positive   Hep A : 4/7/16 reactive  Hep B: 7/7/09 HBsAb reactive  Hep C: 7/7/09 negative  Hep A vaccine: 5/6/10  Tdap vaccine: 6/2/2021  Flu vaccine: last 11/20/15, due  Prevnar-13 vaccine: last 07/2014  Pneumovax-23 vaccine: given in 2009, booster 2015; booster due  Meningococcal MenACWY (Menveo, Menactra) vaccine: 8/13/15, 11/20/15, booster due (but declined because low risk as is monogamous)  HPV vaccine: declined previously  MMR vaccine: unknown  COVID: Pfizer 3/26/21, 4/16/21, 11/9/21   Shingrix: 4/6/22      I communicated with the patient at this visit.  Patient was seen on 4/6/22 in ID clinic.    30 minutes spent on the date of the encounter doing chart review, history and exam, documentation and further activities per the note    Bharathi Porter MD  Infectious Diseases      Subjective       HPI Interval events/updates:  4/6/22 update:  Since last visit, doing well. Had COVID over HILDA - got tired, brain fog, breathing was ok (never got SOB), unclear exposure, asymptomatic for few months now; has been vaccinated x3. Still on Genvoya, only missing ~1/month (takes every morning). No fevers/chills, no SOB, no nausea/vomiting, no diarrhea. Is interested in Shingrix booster this visit; also in possible future second COVID booster.      HIV past medical history:  Diagnosed: 07/2009  Risk factors: MSM   Arjun CD4:  234 (11%) in 07/2009  Genotypes: 7/16/09 HIV  genotype/phenotype: no clinically relevant resistance mutations reported  Treatment history:  Started on Atripla 2009.  CD4 stanislav 234 (11%) in 2009. Switched to Genvoya 2017.  Prior OIs:  None known  Supports:      ART Adherence:  Current regimen: Genvoya 1 tab daily  Missed doses in last week, month:  ~1x/month  Estimates adherence at:  99%  Medication side effects:  Never with Genvoya or Atripla  OTC medications: multivitamin    Social history:  Background: Originally from [place] ([place], now lives in [place])  Lives in/with:    Employment:  Works for [employer] -- [employer]  Education:   Interests:   International travel: none recently -- last visited 's parents in [place]   Pets: none  Alcohol: occasional weekends  Tobacco:  Never regular smoker  Other substances:  never  Sexual Hx:  History of STI diagnosis and treatment: history of chlamydia  In a monogamous relationship with  -- serodiscordant ( has been on PrEP)      PAST MEDICAL HISTORY  Past Medical History:   Diagnosis Date     Depression      Human immunodeficiency virus (HIV) disease (H) Diagnosed .    Started on Atripla 09.       Perirectal abscess .    Responded to therapy with bactrim.   Otherwise as per HPI    PAST SURGICAL HISTORY  Denies history of surgeries  Otherwise as per HPI    ALLERGIES AND DRUG REACTIONS  Allergies   Allergen Reactions     Nkda [No Known Drug Allergies]        FAMILY HISTORY  No known immunocompromising conditions or infections unless listed below  Mother with diabetes    SOCIAL AND FUNCTIONAL HISTORY  Social History     Tobacco Use     Smoking status: Former Smoker     Years: 3.00     Types: Cigarettes     Quit date: 2000     Years since quittin.5     Smokeless tobacco: Never Used   Substance Use Topics     Alcohol use: Yes     Comment: 2 drinks week, usually beer     Drug use: No     Socioeconomic History     Marital status:     As per HPI    CURRENT ANTIBIOTICS  Other medications reviewed in EPIC  Genvoya 1 tab daily      REVIEW OF SYSTEMS  Full 10 point ROS obtained, pertinent positives and negatives as above.      Objective   PHYSICAL EXAMINATION     weight is 78.5 kg (173 lb 1.6 oz). His blood pressure is 145/94 (abnormal) and his pulse is 75. His oxygen saturation is 99%.     Constitutional:  appearance not in distress, appears comfortable, cooperative  Eyes: no conjunctival injection, no scleral icterus  ENT: no nasal discharge, membranes moist, oropharynx pink and without exudates or thrush   Cardiovascular: regular rate and rhythm  Pulmonary: unlabored breathing, clear to ascultation bilaterally  Gastrointestinal: abdomen non-distended, soft, non-tender  Musculoskeletal: normal bulk and tone  Lymphatic: no significant LAD   Extremities: warm and well perfused  Skin: no rashes  Neurologic: awake, alert and responsive, moving all extremities  Psychiatric: normal judgment/insight, normal memory, normal mood/affect      Other Significant Information (Labs, cultures, radiology, etc)    Recent micro:   7/7/09 HIV VL: 462,100  7/7/09 CD4 count: 234 (11%)  7/7/09 HBsAb: positive  7/7/09 HCV ab: negative  7/7/09 HAV ab: negative  7/7/09 CMV IgG ab: positive  7/7/09 toxo IgG ab: negative  7/7/09 syphilis ab: negative  7/16/09 quantiferon: negative  7/16/09 HIV genotype/phenotype: no clinically relevant resistance mutations reported  12/2/11 urine chlamydia: positive  4/7/16 HAV IgG ab: reactive  11/2/17 GC throat PCR: negative  12/22/17 urine chlamydia/gonorrhea: negtive  6/6/19 anal pap: negative cytology  6/4/20 Syphilis ab: negative  6/4/20 HIV VL: undetectable  6/4/20 CD4 count: 808 (39%)   5/26/21 Syphilis ab: negative  5/26/21 HIV VL: undetecable  5/26/21 CD4 count: 1035 (41%)  3/28/22 syphilis screen: negative  3/28/22 HIV VL: undetectable  3/28/22 CD4 count 1219 (44%)    Bharathi Porter MD

## 2022-04-29 ENCOUNTER — TELEPHONE (OUTPATIENT)
Dept: INFECTIOUS DISEASES | Facility: CLINIC | Age: 47
End: 2022-04-29
Payer: COMMERCIAL

## 2022-05-08 ENCOUNTER — HEALTH MAINTENANCE LETTER (OUTPATIENT)
Age: 47
End: 2022-05-08

## 2022-08-03 ENCOUNTER — ALLIED HEALTH/NURSE VISIT (OUTPATIENT)
Dept: INFECTIOUS DISEASES | Facility: CLINIC | Age: 47
End: 2022-08-03
Attending: INTERNAL MEDICINE
Payer: COMMERCIAL

## 2022-08-03 DIAGNOSIS — Z00.00 HEALTH CARE MAINTENANCE: ICD-10-CM

## 2022-08-03 DIAGNOSIS — B20 HUMAN IMMUNODEFICIENCY VIRUS (HIV) DISEASE (H): Primary | ICD-10-CM

## 2022-08-03 DIAGNOSIS — Z20.89 CONTACT WITH AND (SUSPECTED) EXPOSURE TO OTHER COMMUNICABLE DISEASES: ICD-10-CM

## 2022-08-03 PROCEDURE — 90611 SMALLPOX&MONKEYPOX VAC 0.5ML: CPT | Performed by: INTERNAL MEDICINE

## 2022-08-03 PROCEDURE — 90471 IMMUNIZATION ADMIN: CPT | Performed by: INTERNAL MEDICINE

## 2022-08-03 PROCEDURE — 250N000011 HC RX IP 250 OP 636: Performed by: INTERNAL MEDICINE

## 2022-08-03 RX ADMIN — RABIES VACCINE 0.5 ML: KIT at 15:25

## 2022-08-03 NOTE — NURSING NOTE
Nurse review: Monekypox and Jynneos vaccine administration criteria:     Criteria for Jynneos vaccine [facility]     Reviewed Jynneos vaccine criteria Yes  and patient is a candidate for the vaccine: Yes     Patient does not have symptoms of monkeypox: Yes. If patient has symptoms of monkeypox then a provider visit should be scheduled and vaccination not administered.    Patient does not have contraindications to the monkeypox vaccine: Yes.Contraindications for vaccine include:   Allergies to a Jynneos vaccine or to an ingredient in the the vaccine (gentamicin, ciprofloxacin,egg protein).    Date of vaccine: 08/03/22

## 2022-09-21 ENCOUNTER — IMMUNIZATION (OUTPATIENT)
Dept: NURSING | Facility: CLINIC | Age: 47
End: 2022-09-21
Payer: COMMERCIAL

## 2022-09-21 PROCEDURE — 91312 COVID-19,PF,PFIZER BOOSTER BIVALENT: CPT

## 2022-09-21 PROCEDURE — 0124A COVID-19,PF,PFIZER BOOSTER BIVALENT: CPT

## 2022-09-28 ENCOUNTER — ALLIED HEALTH/NURSE VISIT (OUTPATIENT)
Dept: INFECTIOUS DISEASES | Facility: CLINIC | Age: 47
End: 2022-09-28
Attending: INTERNAL MEDICINE
Payer: COMMERCIAL

## 2022-09-28 DIAGNOSIS — Z23 NEED FOR VACCINATION: Primary | ICD-10-CM

## 2022-09-28 PROCEDURE — 250N000011 HC RX IP 250 OP 636: Performed by: INTERNAL MEDICINE

## 2022-09-28 PROCEDURE — 90471 IMMUNIZATION ADMIN: CPT | Performed by: INTERNAL MEDICINE

## 2022-09-28 PROCEDURE — 90611 SMALLPOX&MONKEYPOX VAC 0.5ML: CPT | Performed by: INTERNAL MEDICINE

## 2022-09-28 RX ADMIN — RABIES VACCINE 0.1 ML: KIT at 15:56

## 2022-09-28 NOTE — PROGRESS NOTES
Monkeypox/Jynneos Vaccine    Criteria for Jynneos Vaccination (9/6/22:    No life-threatening allergies to gentamicin, ciprofloxacin or chicken/egg protein (these are components of the vaccine).     No history of keloid scars.     AND VACCINE Eligibility criteria  based on one of the following:     Person identifies as hester, bisexual, or other man who has sex with men (MSM) and has had >1 partner in the past 90 days.    Other person deemed at high risk per clinical judgement or public health recommendation (i.e. treated for a sexually transmitted diseases in the past 6 months, people experiencing homelessness, incarceration, or travel to an area with monkeypox cases)    Person engages in sex work, or exchange sex for food, money, substances, shelter, etc. (not limited to MSM).    Patient is eligible for the Monkeypox vaccine. Yes    Kenneth Verma RN  Infectious Disease 3:40 PM 09/28/22

## 2022-09-28 NOTE — NURSING NOTE
Chief Complaint   Patient presents with     Allied Health Visit     Mpx #2      Clinic Administered Medication Documentation    Administrations This Visit     smallpox-monkeypox (JYNNEOS) intradermal injection 0.1 mL     Admin Date  09/28/2022 Action  Given Dose  0.1 mL Route  Intradermal Site  Right Anterior Forearm Administered By  Dara Cloud CMA    Ordering Provider: Bharathi Porter MD    NDC: 35432-246-86    Lot#: [id number]    : [facility]    Patient Supplied?: No                Dara Cloud CMA

## 2022-11-10 ENCOUNTER — OFFICE VISIT (OUTPATIENT)
Dept: URGENT CARE | Facility: URGENT CARE | Age: 47
End: 2022-11-10
Payer: COMMERCIAL

## 2022-11-10 VITALS
SYSTOLIC BLOOD PRESSURE: 126 MMHG | HEART RATE: 95 BPM | OXYGEN SATURATION: 98 % | DIASTOLIC BLOOD PRESSURE: 98 MMHG | TEMPERATURE: 98.2 F

## 2022-11-10 DIAGNOSIS — J02.9 SORE THROAT: Primary | ICD-10-CM

## 2022-11-10 LAB — DEPRECATED S PYO AG THROAT QL EIA: NEGATIVE

## 2022-11-10 PROCEDURE — 87651 STREP A DNA AMP PROBE: CPT | Performed by: FAMILY MEDICINE

## 2022-11-10 PROCEDURE — U0005 INFEC AGEN DETEC AMPLI PROBE: HCPCS | Performed by: FAMILY MEDICINE

## 2022-11-10 PROCEDURE — U0003 INFECTIOUS AGENT DETECTION BY NUCLEIC ACID (DNA OR RNA); SEVERE ACUTE RESPIRATORY SYNDROME CORONAVIRUS 2 (SARS-COV-2) (CORONAVIRUS DISEASE [COVID-19]), AMPLIFIED PROBE TECHNIQUE, MAKING USE OF HIGH THROUGHPUT TECHNOLOGIES AS DESCRIBED BY CMS-2020-01-R: HCPCS | Performed by: FAMILY MEDICINE

## 2022-11-10 PROCEDURE — 99213 OFFICE O/P EST LOW 20 MIN: CPT | Mod: CS | Performed by: FAMILY MEDICINE

## 2022-11-10 NOTE — PROGRESS NOTES
ICD-10-CM    1. Sore throat  J02.9 Symptomatic; Unknown COVID-19 Virus (Coronavirus) by PCR Nose     Streptococcus A Rapid Screen w/Reflex to PCR - Clinic Collect     Group A Streptococcus PCR Throat Swab      ? Etiology. Doubt covid. May be mild viral illness v gerd related or post nasal drip related. Symptomatic therapy and follow up discussed    -------------------------------  Shan Rashid with presents with 3 days symptoms including sore throat and no other symptoms. ,maybe a little runny nose.     The patient has a history of hiv controlled    Treatment measures tried include None tried.  Exposures--none  Recent travel--no    Current Outpatient Medications   Medication Sig Dispense Refill     GENVOYA [phone] MG PO TABS Take 1 tablet by mouth daily 30 tablet 5       ROS otherwise negative for resp., ID,  HEENT symptoms.    Objective: BP (!) 126/98 (BP Location: Right arm, Patient Position: Sitting, Cuff Size: Adult Regular)   Pulse 95   Temp 98.2  F (36.8  C) (Oral)   SpO2 98%   Exam:  GENERAL APPEARANCE: healthy, alert and no distress  EYES: Eyes grossly normal to inspection  HENT: ear canals and TM's normal and nose and mouth without ulcers or lesions  NECK: no adenopathy, no asymmetry, masses, or scars and thyroid normal to palpation  RESP: lungs clear to auscultation - no rales, rhonchi or wheezes  CV: regular rates and rhythm, no murmur    Results for orders placed or performed in visit on 11/10/22   Streptococcus A Rapid Screen w/Reflex to PCR - Clinic Collect     Status: Normal    Specimen: Throat; Swab   Result Value Ref Range    Group A Strep antigen Negative Negative

## 2022-11-10 NOTE — PATIENT INSTRUCTIONS
Gargling with warm to hot salt water may also help your sore throat.     Ibu/tylenol may help the symptoms.

## 2022-11-11 LAB
GROUP A STREP BY PCR: NOT DETECTED
SARS-COV-2 RNA RESP QL NAA+PROBE: NEGATIVE

## 2023-01-24 DIAGNOSIS — B20 HUMAN IMMUNODEFICIENCY VIRUS (HIV) DISEASE (H): ICD-10-CM

## 2023-01-24 RX ORDER — ELVITEGRAVIR, COBICISTAT, EMTRICITABINE, AND TENOFOVIR ALAFENAMIDE 150; 150; 200; 10 MG/1; MG/1; MG/1; MG/1
1 TABLET ORAL DAILY
Qty: 30 TABLET | Refills: 5 | Status: SHIPPED | OUTPATIENT
Start: 2023-01-24 | End: 2023-04-28 | Stop reason: ALTCHOICE

## 2023-04-10 ENCOUNTER — HOSPITAL ENCOUNTER (OUTPATIENT)
Facility: CLINIC | Age: 48
Discharge: HOME | End: 2023-04-10
Attending: INTERNAL MEDICINE | Admitting: INTERNAL MEDICINE
Payer: COMMERCIAL

## 2023-04-10 ENCOUNTER — LAB (OUTPATIENT)
Dept: LAB | Facility: CLINIC | Age: 48
End: 2023-04-10
Attending: INTERNAL MEDICINE
Payer: COMMERCIAL

## 2023-04-10 DIAGNOSIS — B20 HUMAN IMMUNODEFICIENCY VIRUS (HIV) DISEASE (H): ICD-10-CM

## 2023-04-10 LAB
ALBUMIN SERPL BCG-MCNC: 4.6 G/DL (ref 3.5–5.2)
ALP SERPL-CCNC: 70 U/L (ref 40–129)
ALT SERPL W P-5'-P-CCNC: 60 U/L (ref 10–50)
ANION GAP SERPL CALCULATED.3IONS-SCNC: 10 MMOL/L (ref 7–15)
AST SERPL W P-5'-P-CCNC: 33 U/L (ref 10–50)
BASOPHILS # BLD AUTO: 0 10E3/UL (ref 0–0.2)
BASOPHILS NFR BLD AUTO: 0 %
BILIRUB SERPL-MCNC: 0.2 MG/DL
BUN SERPL-MCNC: 17 MG/DL (ref 6–20)
CALCIUM SERPL-MCNC: 9.7 MG/DL (ref 8.6–10)
CHLORIDE SERPL-SCNC: 100 MMOL/L (ref 98–107)
CREAT SERPL-MCNC: 1.14 MG/DL (ref 0.67–1.17)
DEPRECATED HCO3 PLAS-SCNC: 28 MMOL/L (ref 22–29)
EOSINOPHIL # BLD AUTO: 0.1 10E3/UL (ref 0–0.7)
EOSINOPHIL NFR BLD AUTO: 2 %
ERYTHROCYTE [DISTWIDTH] IN BLOOD BY AUTOMATED COUNT: 13.1 % (ref 10–15)
GFR SERPL CREATININE-BSD FRML MDRD: 80 ML/MIN/1.73M2
GLUCOSE SERPL-MCNC: 89 MG/DL (ref 70–99)
HCT VFR BLD AUTO: 41.1 % (ref 40–53)
HGB BLD-MCNC: 14.3 G/DL (ref 13.3–17.7)
IMM GRANULOCYTES # BLD: 0 10E3/UL
IMM GRANULOCYTES NFR BLD: 0 %
LYMPHOCYTES # BLD AUTO: 2.5 10E3/UL (ref 0.8–5.3)
LYMPHOCYTES NFR BLD AUTO: 36 %
MCH RBC QN AUTO: 28.4 PG (ref 26.5–33)
MCHC RBC AUTO-ENTMCNC: 34.8 G/DL (ref 31.5–36.5)
MCV RBC AUTO: 82 FL (ref 78–100)
MONOCYTES # BLD AUTO: 0.6 10E3/UL (ref 0–1.3)
MONOCYTES NFR BLD AUTO: 10 %
NEUTROPHILS # BLD AUTO: 3.5 10E3/UL (ref 1.6–8.3)
NEUTROPHILS NFR BLD AUTO: 52 %
NRBC # BLD AUTO: 0 10E3/UL
NRBC BLD AUTO-RTO: 0 /100
PLATELET # BLD AUTO: 275 10E3/UL (ref 150–450)
POTASSIUM SERPL-SCNC: 4.1 MMOL/L (ref 3.4–5.3)
PROT SERPL-MCNC: 7.6 G/DL (ref 6.4–8.3)
RBC # BLD AUTO: 5.03 10E6/UL (ref 4.4–5.9)
SODIUM SERPL-SCNC: 138 MMOL/L (ref 136–145)
WBC # BLD AUTO: 6.7 10E3/UL (ref 4–11)

## 2023-04-10 PROCEDURE — 86359 T CELLS TOTAL COUNT: CPT

## 2023-04-10 PROCEDURE — 36415 COLL VENOUS BLD VENIPUNCTURE: CPT | Performed by: PATHOLOGY

## 2023-04-10 PROCEDURE — 99000 SPECIMEN HANDLING OFFICE-LAB: CPT | Performed by: PATHOLOGY

## 2023-04-10 PROCEDURE — 85025 COMPLETE CBC W/AUTO DIFF WBC: CPT | Performed by: PATHOLOGY

## 2023-04-10 PROCEDURE — 86360 T CELL ABSOLUTE COUNT/RATIO: CPT

## 2023-04-10 PROCEDURE — 87536 HIV-1 QUANT&REVRSE TRNSCRPJ: CPT

## 2023-04-10 PROCEDURE — 80053 COMPREHEN METABOLIC PANEL: CPT | Performed by: PATHOLOGY

## 2023-04-11 LAB
CD3 CELLS # BLD: 2004 CELLS/UL (ref 603–2990)
CD3 CELLS NFR BLD: 82 % (ref 49–84)
CD3+CD4+ CELLS # BLD: 1056 CELLS/UL (ref 441–2156)
CD3+CD4+ CELLS NFR BLD: 43 % (ref 28–63)
CD3+CD4+ CELLS/CD3+CD8+ CLL BLD: 1.3 % (ref 1.4–2.6)
CD3+CD8+ CELLS # BLD: 811 CELLS/UL (ref 125–1312)
CD3+CD8+ CELLS NFR BLD: 33 % (ref 10–40)
T CELL COMMENT: ABNORMAL

## 2023-04-12 LAB — HIV1 RNA # PLAS NAA DL=20: NOT DETECTED COPIES/ML

## 2023-04-26 ENCOUNTER — OFFICE VISIT (OUTPATIENT)
Dept: INFECTIOUS DISEASES | Facility: CLINIC | Age: 48
End: 2023-04-26
Attending: INTERNAL MEDICINE
Payer: COMMERCIAL

## 2023-04-26 VITALS — OXYGEN SATURATION: 98 % | SYSTOLIC BLOOD PRESSURE: 140 MMHG | HEART RATE: 84 BPM | DIASTOLIC BLOOD PRESSURE: 90 MMHG

## 2023-04-26 DIAGNOSIS — Z00.00 HEALTHCARE MAINTENANCE: ICD-10-CM

## 2023-04-26 DIAGNOSIS — B20 HUMAN IMMUNODEFICIENCY VIRUS (HIV) DISEASE (H): Primary | ICD-10-CM

## 2023-04-26 PROCEDURE — 90471 IMMUNIZATION ADMIN: CPT | Performed by: INTERNAL MEDICINE

## 2023-04-26 PROCEDURE — 90750 HZV VACC RECOMBINANT IM: CPT | Performed by: INTERNAL MEDICINE

## 2023-04-26 PROCEDURE — G0463 HOSPITAL OUTPT CLINIC VISIT: HCPCS | Mod: 25 | Performed by: INTERNAL MEDICINE

## 2023-04-26 PROCEDURE — 99214 OFFICE O/P EST MOD 30 MIN: CPT | Performed by: INTERNAL MEDICINE

## 2023-04-26 PROCEDURE — 250N000021 HC RX MED A9270 GY (STAT IND- M) 250: Performed by: INTERNAL MEDICINE

## 2023-04-26 RX ADMIN — ZOSTER VACCINE RECOMBINANT, ADJUVANTED 0.5 ML: KIT at 15:43

## 2023-04-26 NOTE — PROGRESS NOTES
Patient given fluid bolus off floor for a CT with contrast. Shan Rashid is here today for HIV care follow up.      Assessment & Plan/Recommendations     Shan is a 48 year old male with PMH including HIV (diagnosed in 07/2009 with CD4 stanislav 234 (11%) at that time, started on Atripla 08/2009, then switched to Genvoya 4/6/2017; well controlled on ART; previously followed with Dr. Pemberton), GERD, history of skin abscesses and prior perirectal abscess (s/p Bactrim course in 2010), history of depression (now resolved), history of chlamydia (2011), who presents to ID clinic for follow up of HIV care.      1. HIV, asymptomatic  - well controlled on antiretroviral therapy; patient expresses interest in changing Genvoya (FTC/TAF/EVG/c) to Biktarvy (FTC/TAF/BIC) so will prescribe today and follow up to reassess patient in 1 month  - already had routine HIV maintenance labs drawn before this visit which we reviewed together  - will order routine screening labs for next visit  - has declined repeating gonorrhea/chlamydia, HCV, quantiferon screenings previously (since he says he's low risk now in a monogamous relationship with ), but says he is amenable to additional labs next visit though (but not to throat/rectal swabs)  - follow up in approx 1 months to reassess on new antiretroviral regimen      2.  Health maintenance  - patient amenable to second shingrix dose this visit (missed it for the 2-6 month second dose as was planned last year)  - consider addressing other possible routine vaccine boosters next visit as noted below  - recommended he establish care with PCP for remaining health maintenance management including blood pressure management and other routine issues/screenings - patient says he will work on this prior to next visit; also suggested that patient monitor his BP at home to see if it is elevated there as it has been high on last few clinic visits with us; also consider lipid screening at future visit         Colonoscopy: never  GC/Chlamydia: CT urine  positive 12/2/11; last 12/22/17 negative CT/GC screen  Syphilis: last 5/26/21 negative   Quantiferon: last negative 7/16/09  G6PD: unknown  HLA-: unknown  Toxo: 7/7/09 negative  Crypto (if CD4<50): unknown  CMV: 7/16/09 IgG positive   Hep A : 4/7/16 reactive  Hep B: 7/7/09 HBsAb reactive  Hep C: 7/7/09 negative  Hep A vaccine: 5/6/10  Tdap vaccine: 6/2/2021  Flu vaccine: last 9/28/22  Prevnar-13 vaccine: 07/2014  Pneumovax-23 vaccine: given in 2009, booster 2015; booster due  Meningococcal MenACWY (Menveo, Menactra) vaccine: 8/13/15, 11/20/15, booster due (but declined because says low risk as is monogamous)  HPV vaccine: declined previously  MMR vaccine: unknown  COVID: 3/26/21, 4/16/21, 11/9/21, 9/21/22  Shingrix: 4/6/22, 4/28/23  Jynneos: 8/3/22, 9/28/22      I communicated with the patient at this visit.  Patient was seen on 4/26/22 in ID clinic.    30 minutes spent on the date of the encounter doing chart review, history and exam, documentation and further activities per the note    Bharathi Porter MD  Infectious Diseases      Subjective       HPI Interval events/updates:  4/26/22 update:  Patient says he's been doing well since last visit. Says he remains on Genvoya daily, only misses a dose ~1x/month at most. Takes it every morning. No side effects. No major life changes or health changes since last visit. No fevers/chills, no GI symptoms. Monogamous with . Only 1-2 drinks a week, no cigarettes. Has not seen any PCP recently, looking to get set up soon. He got Jynneos vaccine already. Says he's open to routine labs/STI screens next visit (but no swabs as says he's low risk in a monogamous relationship with ). Also says he's interested in changing regimen to Biktarvy.     HIV past medical history:  Diagnosed: 07/2009  Risk factors: MSM   Arjun CD4:  234 (11%) in 07/2009  Genotypes: 7/16/09 HIV genotype/phenotype: no clinically relevant resistance mutations reported  Treatment history:   Started on Atripla 2009.  CD4 stanislav 234 (11%) in 2009. Switched to Genvoya 2017; changed to Biktarvy 23  Prior OIs:  None known  Supports:      ART Adherence:  Current regimen: Genvoya 1 tab daily  Missed doses in last week, month:  ~1x/month  Estimates adherence at:  99%  Medication side effects:  Never with Genvoya or Atripla  OTC medications: multivitamin    Social history:  Background: Originally from [place] ([place], now lives in [place])  Lives in/with:    Employment:  Works for [employer] -- [employer]  Education:   Interests:   International travel: none recently -- last visited 's parents in [place]   Pets: none  Alcohol: occasional weekends  Tobacco:  Never regular smoker  Other substances:  never  Sexual Hx:  History of STI diagnosis and treatment: history of chlamydia  In a monogamous relationship with  -- serodiscordant ( has been on PrEP)      PAST MEDICAL HISTORY  Past Medical History:   Diagnosis Date     Depression      Human immunodeficiency virus (HIV) disease (H) Diagnosed .    Started on Atripla 09.       Perirectal abscess .    Responded to therapy with bactrim.   Otherwise as per HPI    PAST SURGICAL HISTORY  Denies history of surgeries  Otherwise as per HPI    ALLERGIES AND DRUG REACTIONS  Allergies   Allergen Reactions     Nkda [No Known Drug Allergy]        FAMILY HISTORY  No known immunocompromising conditions or infections unless listed below  Mother with diabetes    SOCIAL AND FUNCTIONAL HISTORY  Social History     Tobacco Use     Smoking status: Former     Years: 3.00     Types: Cigarettes     Quit date: 2000     Years since quittin.6     Smokeless tobacco: Never   Substance Use Topics     Alcohol use: Yes     Comment: 2 drinks week, usually beer     Drug use: No     Socioeconomic History     Marital status:    As per HPI    CURRENT ANTIBIOTICS  Other medications reviewed in  EPIC  Genvoya 1 tab daily      REVIEW OF SYSTEMS  Full 10 point ROS obtained, pertinent positives and negatives as above.      Objective   PHYSICAL EXAMINATION     blood pressure is 140/90 (abnormal) and his pulse is 84. His oxygen saturation is 98%.     Constitutional:  appearance not in acute distress  Eyes: no conjunctival injection, no scleral icterus  Cardiovascular: regular rate and rhythm  Pulmonary: unlabored breathing, clear to ascultation bilaterally  Gastrointestinal: abdomen non-distended   Musculoskeletal: normal bulk and tone  Extremities: warm and well perfused  Skin: no rashes visible on exposed skin  Neurologic: awake, alert and interactive      Other Significant Information (Labs, cultures, radiology, etc)    Recent micro:   7/7/09 HIV VL: 462,100  7/7/09 CD4 count: 234 (11%)  7/7/09 HBsAb: positive  7/7/09 HCV ab: negative  7/7/09 HAV ab: negative  7/7/09 CMV IgG ab: positive  7/7/09 toxo IgG ab: negative  7/7/09 syphilis ab: negative  7/16/09 quantiferon: negative  7/16/09 HIV genotype/phenotype: no clinically relevant resistance mutations reported  12/2/11 urine chlamydia: positive  4/7/16 HAV IgG ab: reactive  11/2/17 GC throat PCR: negative  12/22/17 urine chlamydia/gonorrhea: negtive  6/6/19 anal pap: negative cytology  6/4/20 Syphilis ab: negative  6/4/20 HIV VL: undetectable  6/4/20 CD4 count: 808 (39%)   5/26/21 Syphilis ab: negative  5/26/21 HIV VL: undetecable  5/26/21 CD4 count: 1035 (41%)  3/28/22 syphilis screen: negative  3/28/22 HIV VL: undetectable  3/28/22 CD4 count 1219 (44%)  4/10/23 HIV viral load: undetectable  4/10/23 CD4 count 1056 (43%)

## 2023-04-26 NOTE — Clinical Note
4/26/2023       RE: Shan Rashid  [address]  [facility] [address]     Dear Colleague,    Thank you for referring your patient, Shan Rashid, to the [facility] [place] at [facility]. Please see a copy of my visit note below.    Shan Rashid is here today for HIV care follow up.      Assessment & Plan/Recommendations     Shan is a 48 year old male with PMH including HIV (diagnosed in 07/2009 with CD4 stanislav 234 (11%) at that time, started on Atripla 08/2009, then switched to Genvoya 4/6/2017; well controlled on ART; previously followed with Dr. Pemberton), GERD, history of skin abscesses and prior perirectal abscess (s/p Bactrim course in 2010), history of depression (now resolved), history of chlamydia (2011), who presents to ID clinic for follow up of HIV care.      1. HIV, asymptomatic  - well controlled on antiretroviral therapy; patient expresses interest in changing Genvoya (FTC/TAF/EVG/c) to Biktarvy (FTC/TAF/BIC) so will prescribe today and follow up to reassess patient in 1 month  - already had routine HIV maintenance labs drawn before this visit which we reviewed together  - will order routine screening labs for next visit  - has declined repeating gonorrhea/chlamydia, HCV, quantiferon screenings previously (since he says he's low risk now in a monogamous relationship with ), but says he is amenable to additional labs next visit though (but not to throat/rectal swabs)  - follow up in approx 1 months to reassess on new antiretroviral regimen      2.  Health maintenance  - patient amenable to second shingrix dose this visit (missed it for the 2-6 month second dose as was planned last year)  - consider addressing other possible routine vaccine boosters next visit as noted below  - recommended he establish care with PCP for remaining health maintenance management including blood pressure  management and other routine issues/screenings - patient says he will work on this prior to next visit; also suggested that patient monitor his BP at home to see if it is elevated there as it has been high on last few clinic visits with us; also consider lipid screening at future visit         Colonoscopy: never  GC/Chlamydia: CT urine positive 12/2/11; last 12/22/17 negative CT/GC screen  Syphilis: last 5/26/21 negative   Quantiferon: last negative 7/16/09  G6PD: unknown  HLA-: unknown  Toxo: 7/7/09 negative  Crypto (if CD4<50): unknown  CMV: 7/16/09 IgG positive   Hep A : 4/7/16 reactive  Hep B: 7/7/09 HBsAb reactive  Hep C: 7/7/09 negative  Hep A vaccine: 5/6/10  Tdap vaccine: 6/2/2021  Flu vaccine: last 9/28/22  Prevnar-13 vaccine: 07/2014  Pneumovax-23 vaccine: given in 2009, booster 2015; booster due  Meningococcal MenACWY (Menveo, Menactra) vaccine: 8/13/15, 11/20/15, booster due (but declined because says low risk as is monogamous)  HPV vaccine: declined previously  MMR vaccine: unknown  COVID: 3/26/21, 4/16/21, 11/9/21, 9/21/22  Shingrix: 4/6/22, 4/28/23  Jynneos: 8/3/22, 9/28/22      I communicated with the patient at this visit.  Patient was seen on 4/26/22 in ID clinic.    30 minutes spent on the date of the encounter doing chart review, history and exam, documentation and further activities per the note    Bharathi Porter MD  Infectious Diseases      Subjective       HPI Interval events/updates:  4/26/22 update:  Patient says he's been doing well since last visit. Says he remains on Genvoya daily, only misses a dose ~1x/month at most. Takes it every morning. No side effects. No major life changes or health changes since last visit. No fevers/chills, no GI symptoms. Monogamous with . Only 1-2 drinks a week, no cigarettes. Has not seen any PCP recently, looking to get set up soon. He got Jynneos vaccine already. Says he's open to routine labs/STI screens next visit (but no swabs as says he's  low risk in a monogamous relationship with ). Also says he's interested in changing regimen to Biktarvy.     HIV past medical history:  Diagnosed: 07/2009  Risk factors: MSM   Stanislav CD4:  234 (11%) in 07/2009  Genotypes: 7/16/09 HIV genotype/phenotype: no clinically relevant resistance mutations reported  Treatment history:  Started on Atripla 08/2009.  CD4 stanislav 234 (11%) in 07/2009. Switched to Genvoya 4/6/2017; changed to Biktarvy 4/26/23  Prior OIs:  None known  Supports:      ART Adherence:  Current regimen: Genvoya 1 tab daily  Missed doses in last week, month:  ~1x/month  Estimates adherence at:  99%  Medication side effects:  Never with Genvoya or Atripla  OTC medications: multivitamin    Social history:  Background: Originally from [place] ([place], now lives in [place])  Lives in/with:    Employment:  Works for [employer] -- [employer]  Education:   Interests:   International travel: none recently -- last visited 's parents in [place] 2019  Pets: none  Alcohol: occasional weekends  Tobacco:  Never regular smoker  Other substances:  never  Sexual Hx:  History of STI diagnosis and treatment: history of chlamydia  In a monogamous relationship with  -- serodiscordant ( has been on PrEP)      PAST MEDICAL HISTORY  Past Medical History:   Diagnosis Date     Depression      Human immunodeficiency virus (HIV) disease (H) Diagnosed July, 2009.    Started on Atripla 8/13/09.       Perirectal abscess October, 2010.    Responded to therapy with bactrim.   Otherwise as per HPI    PAST SURGICAL HISTORY  Denies history of surgeries  Otherwise as per HPI    ALLERGIES AND DRUG REACTIONS  Allergies   Allergen Reactions     Nkda [No Known Drug Allergy]        FAMILY HISTORY  No known immunocompromising conditions or infections unless listed below  Mother with diabetes    SOCIAL AND FUNCTIONAL HISTORY  Social History     Tobacco Use     Smoking status: Former     Years: 3.00      Types: Cigarettes     Quit date: 2000     Years since quittin.6     Smokeless tobacco: Never   Substance Use Topics     Alcohol use: Yes     Comment: 2 drinks week, usually beer     Drug use: No     Socioeconomic History     Marital status:    As per HPI    CURRENT ANTIBIOTICS  Other medications reviewed in EPIC  Genvoya 1 tab daily      REVIEW OF SYSTEMS  Full 10 point ROS obtained, pertinent positives and negatives as above.      Objective   PHYSICAL EXAMINATION     blood pressure is 140/90 (abnormal) and his pulse is 84. His oxygen saturation is 98%.     Constitutional:  appearance not in distress, appears comfortable, cooperative  Eyes: no conjunctival injection, no scleral icterus  ENT: no nasal discharge, membranes moist, oropharynx pink and without exudates or thrush   Cardiovascular: regular rate and rhythm  Pulmonary: unlabored breathing, clear to ascultation bilaterally  Gastrointestinal: abdomen non-distended, soft, non-tender  Musculoskeletal: normal bulk and tone  Lymphatic: no significant LAD   Extremities: warm and well perfused  Skin: no rashes  Neurologic: awake, alert and responsive, moving all extremities  Psychiatric: normal judgment/insight, normal memory, normal mood/affect      Other Significant Information (Labs, cultures, radiology, etc)    Recent micro:   09 HIV VL: 462,100  09 CD4 count: 234 (11%)  09 HBsAb: positive  09 HCV ab: negative  09 HAV ab: negative  09 CMV IgG ab: positive  09 toxo IgG ab: negative  09 syphilis ab: negative  09 quantiferon: negative  09 HIV genotype/phenotype: no clinically relevant resistance mutations reported  11 urine chlamydia: positive  16 HAV IgG ab: reactive  17 GC throat PCR: negative  17 urine chlamydia/gonorrhea: negtive  19 anal pap: negative cytology  20 Syphilis ab: negative  20 HIV VL: undetectable  20 CD4 count: 808 (39%)   21 Syphilis ab:  negative  5/26/21 HIV VL: undetecable  5/26/21 CD4 count: 1035 (41%)  3/28/22 syphilis screen: negative  3/28/22 HIV VL: undetectable  3/28/22 CD4 count 1219 (44%)  4/10/23 HIV viral load: undetectable  4/10/23 CD4 count 1056 (43%)      Again, thank you for allowing me to participate in the care of your patient.      Sincerely,    Bharathi Porter MD

## 2023-04-27 ENCOUNTER — TELEPHONE (OUTPATIENT)
Dept: INFECTIOUS DISEASES | Facility: CLINIC | Age: 48
End: 2023-04-27
Payer: COMMERCIAL

## 2023-04-27 NOTE — TELEPHONE ENCOUNTER
EP LVM 4/27 to sched 1 month (around 5/26) follow up with Dr. Porter with labs prior per checkout notes from 4/26.

## 2023-05-01 ENCOUNTER — TELEPHONE (OUTPATIENT)
Dept: INFECTIOUS DISEASES | Facility: CLINIC | Age: 48
End: 2023-05-01
Payer: COMMERCIAL

## 2023-05-01 NOTE — TELEPHONE ENCOUNTER
EP LVM 5/1 to sched 1 month (around 5/26) follow up with Dr. Porter with labs prior per checkout notes from 4/26. --2nd attempt.

## 2023-06-02 ENCOUNTER — HEALTH MAINTENANCE LETTER (OUTPATIENT)
Age: 48
End: 2023-06-02

## 2023-08-03 ENCOUNTER — LAB (OUTPATIENT)
Dept: LAB | Facility: CLINIC | Age: 48
End: 2023-08-03
Attending: INTERNAL MEDICINE
Payer: COMMERCIAL

## 2023-08-03 DIAGNOSIS — B20 HUMAN IMMUNODEFICIENCY VIRUS (HIV) DISEASE (H): ICD-10-CM

## 2023-08-03 LAB
ALBUMIN SERPL BCG-MCNC: 4.6 G/DL (ref 3.5–5.2)
ALBUMIN UR-MCNC: NEGATIVE MG/DL
ALP SERPL-CCNC: 70 U/L (ref 40–129)
ALT SERPL W P-5'-P-CCNC: 45 U/L (ref 0–70)
ANION GAP SERPL CALCULATED.3IONS-SCNC: 9 MMOL/L (ref 7–15)
APPEARANCE UR: CLEAR
AST SERPL W P-5'-P-CCNC: 28 U/L (ref 0–45)
BILIRUB SERPL-MCNC: 0.2 MG/DL
BILIRUB UR QL STRIP: NEGATIVE
BUN SERPL-MCNC: 13.9 MG/DL (ref 6–20)
CALCIUM SERPL-MCNC: 9.3 MG/DL (ref 8.6–10)
CHLORIDE SERPL-SCNC: 106 MMOL/L (ref 98–107)
CHOLEST SERPL-MCNC: 170 MG/DL
COLOR UR AUTO: YELLOW
CREAT SERPL-MCNC: 1.05 MG/DL (ref 0.67–1.17)
DEPRECATED HCO3 PLAS-SCNC: 24 MMOL/L (ref 22–29)
ERYTHROCYTE [DISTWIDTH] IN BLOOD BY AUTOMATED COUNT: 13.2 % (ref 10–15)
GFR SERPL CREATININE-BSD FRML MDRD: 88 ML/MIN/1.73M2
GLUCOSE SERPL-MCNC: 92 MG/DL (ref 70–99)
GLUCOSE UR STRIP-MCNC: NEGATIVE MG/DL
HCT VFR BLD AUTO: 41.4 % (ref 40–53)
HDLC SERPL-MCNC: 41 MG/DL
HGB BLD-MCNC: 14.3 G/DL (ref 13.3–17.7)
HGB UR QL STRIP: NEGATIVE
KETONES UR STRIP-MCNC: NEGATIVE MG/DL
LDLC SERPL CALC-MCNC: 102 MG/DL
LEUKOCYTE ESTERASE UR QL STRIP: NEGATIVE
MCH RBC QN AUTO: 28.3 PG (ref 26.5–33)
MCHC RBC AUTO-ENTMCNC: 34.5 G/DL (ref 31.5–36.5)
MCV RBC AUTO: 82 FL (ref 78–100)
NITRATE UR QL: NEGATIVE
NONHDLC SERPL-MCNC: 129 MG/DL
PH UR STRIP: 6 [PH] (ref 5–7)
PLATELET # BLD AUTO: 263 10E3/UL (ref 150–450)
POTASSIUM SERPL-SCNC: 4 MMOL/L (ref 3.4–5.3)
PROT SERPL-MCNC: 7.1 G/DL (ref 6.4–8.3)
RBC # BLD AUTO: 5.06 10E6/UL (ref 4.4–5.9)
RBC URINE: <1 /HPF
SODIUM SERPL-SCNC: 139 MMOL/L (ref 136–145)
SP GR UR STRIP: 1.02 (ref 1–1.03)
T PALLIDUM AB SER QL: NONREACTIVE
TRIGL SERPL-MCNC: 137 MG/DL
UROBILINOGEN UR STRIP-MCNC: NORMAL MG/DL
WBC # BLD AUTO: 5.9 10E3/UL (ref 4–11)
WBC URINE: 1 /HPF

## 2023-08-03 PROCEDURE — 80061 LIPID PANEL: CPT | Performed by: PATHOLOGY

## 2023-08-03 PROCEDURE — 85027 COMPLETE CBC AUTOMATED: CPT | Performed by: PATHOLOGY

## 2023-08-03 PROCEDURE — 80053 COMPREHEN METABOLIC PANEL: CPT | Performed by: PATHOLOGY

## 2023-08-03 PROCEDURE — 86803 HEPATITIS C AB TEST: CPT | Performed by: INTERNAL MEDICINE

## 2023-08-03 PROCEDURE — 36415 COLL VENOUS BLD VENIPUNCTURE: CPT | Performed by: PATHOLOGY

## 2023-08-03 PROCEDURE — 99000 SPECIMEN HANDLING OFFICE-LAB: CPT | Performed by: PATHOLOGY

## 2023-08-03 PROCEDURE — 86780 TREPONEMA PALLIDUM: CPT | Performed by: INTERNAL MEDICINE

## 2023-08-03 PROCEDURE — 81001 URINALYSIS AUTO W/SCOPE: CPT | Performed by: PATHOLOGY

## 2023-08-03 PROCEDURE — 87536 HIV-1 QUANT&REVRSE TRNSCRPJ: CPT | Performed by: INTERNAL MEDICINE

## 2023-08-03 PROCEDURE — 86481 TB AG RESPONSE T-CELL SUSP: CPT | Performed by: INTERNAL MEDICINE

## 2023-08-04 LAB — HCV AB SERPL QL IA: NONREACTIVE

## 2023-08-05 LAB
HIV1 RNA # PLAS NAA DL=20: 52 COPIES/ML
HIV1 RNA SERPL NAA+PROBE-LOG#: 1.7 {LOG_COPIES}/ML

## 2023-08-06 LAB
GAMMA INTERFERON BACKGROUND BLD IA-ACNC: 0.03 IU/ML
M TB IFN-G BLD-IMP: NEGATIVE
M TB IFN-G CD4+ BCKGRND COR BLD-ACNC: 9.97 IU/ML
MITOGEN IGNF BCKGRD COR BLD-ACNC: -0.02 IU/ML
MITOGEN IGNF BCKGRD COR BLD-ACNC: 0 IU/ML
QUANTIFERON MITOGEN: 10 IU/ML
QUANTIFERON NIL TUBE: 0.03 IU/ML
QUANTIFERON TB1 TUBE: 0.03 IU/ML
QUANTIFERON TB2 TUBE: 0.01

## 2023-08-30 ENCOUNTER — OFFICE VISIT (OUTPATIENT)
Dept: INFECTIOUS DISEASES | Facility: CLINIC | Age: 48
End: 2023-08-30
Attending: INTERNAL MEDICINE
Payer: COMMERCIAL

## 2023-08-30 VITALS
BODY MASS INDEX: 24.45 KG/M2 | TEMPERATURE: 97.8 F | OXYGEN SATURATION: 98 % | HEART RATE: 84 BPM | WEIGHT: 165.6 LBS | DIASTOLIC BLOOD PRESSURE: 89 MMHG | SYSTOLIC BLOOD PRESSURE: 127 MMHG

## 2023-08-30 DIAGNOSIS — Z00.00 HEALTHCARE MAINTENANCE: ICD-10-CM

## 2023-08-30 DIAGNOSIS — B20 HUMAN IMMUNODEFICIENCY VIRUS (HIV) DISEASE (H): Primary | ICD-10-CM

## 2023-08-30 PROCEDURE — 90677 PCV20 VACCINE IM: CPT | Performed by: INTERNAL MEDICINE

## 2023-08-30 PROCEDURE — G0009 ADMIN PNEUMOCOCCAL VACCINE: HCPCS | Performed by: INTERNAL MEDICINE

## 2023-08-30 PROCEDURE — 250N000011 HC RX IP 250 OP 636: Performed by: INTERNAL MEDICINE

## 2023-08-30 PROCEDURE — 99214 OFFICE O/P EST MOD 30 MIN: CPT | Performed by: INTERNAL MEDICINE

## 2023-08-30 PROCEDURE — G0463 HOSPITAL OUTPT CLINIC VISIT: HCPCS | Mod: 25 | Performed by: INTERNAL MEDICINE

## 2023-08-30 RX ADMIN — PNEUMOCOCCAL 20-VALENT CONJUGATE VACCINE 0.5 ML
2.2; 2.2; 2.2; 2.2; 2.2; 2.2; 2.2; 2.2; 2.2; 2.2; 2.2; 2.2; 2.2; 2.2; 2.2; 2.2; 4.4; 2.2; 2.2; 2.2 INJECTION, SUSPENSION INTRAMUSCULAR at 15:32

## 2023-08-30 ASSESSMENT — PAIN SCALES - GENERAL: PAINLEVEL: NO PAIN (0)

## 2023-08-30 NOTE — Clinical Note
8/30/2023       RE: Shan Rashid  [address]  [facility] [address]     Dear Colleague,    Thank you for referring your patient, Shan Rashid, to the [facility] INFECTIOUS DISEASE CLINIC [place] at [facility]. Please see a copy of my visit note below.    Shan Rashid is here today for HIV care follow up.      Assessment & Plan/Recommendations     Shan is a 48 year old male with PMH including HIV (diagnosed in 07/2009 with CD4 stanislav 234 (11%) at that time, started on Atripla 08/2009, then switched to Genvoya 4/6/2017; well controlled on ART; previously followed with Dr. Pemberton), GERD, history of skin abscesses and prior perirectal abscess (s/p Bactrim course in 2010), history of depression (now resolved), history of chlamydia (2011), who presents to ID clinic for follow up of HIV care.      1. HIV, asymptomatic  - well controlled on antiretroviral therapy; patient expresses interest in changing Genvoya (FTC/TAF/EVG/c) to Biktarvy (FTC/TAF/BIC) so will prescribe today and follow up to reassess patient in 1 month  - already had routine HIV maintenance labs drawn before this visit which we reviewed together  - will order routine screening labs for next visit  - has declined repeating gonorrhea/chlamydia, HCV, quantiferon screenings previously (since he says he's low risk now in a monogamous relationship with ), but says he is amenable to additional labs next visit though (but not to throat/rectal swabs)  - follow up in approx 1 months to reassess on new antiretroviral regimen      2.  Health maintenance  - patient amenable to second shingrix dose this visit (missed it for the 2-6 month second dose as was planned last year)  - consider addressing other possible routine vaccine boosters next visit as noted below  - recommended he establish care with PCP for remaining health maintenance management including blood pressure  management and other routine issues/screenings - patient says he will work on this prior to next visit; also suggested that patient monitor his BP at home to see if it is elevated there as it has been high on last few clinic visits with us; also consider lipid screening at future visit         Colonoscopy: never  GC/Chlamydia: CT urine positive 12/2/11; last 12/22/17 negative CT/GC screen  Syphilis: last 5/26/21 negative   Quantiferon: last negative 7/16/09  G6PD: unknown  HLA-: unknown  Toxo: 7/7/09 negative  Crypto (if CD4<50): unknown  CMV: 7/16/09 IgG positive   Hep A : 4/7/16 reactive  Hep B: 7/7/09 HBsAb reactive  Hep C: 7/7/09 negative  Hep A vaccine: 5/6/10  Tdap vaccine: 6/2/2021  Flu vaccine: last 9/28/22  Prevnar-13 vaccine: 07/2014  Pneumovax-23 vaccine: given in 2009, booster 2015; booster due  Meningococcal MenACWY (Menveo, Menactra) vaccine: 8/13/15, 11/20/15, booster due (but declined because says low risk as is monogamous)  HPV vaccine: declined previously  MMR vaccine: unknown  COVID: 3/26/21, 4/16/21, 11/9/21, 9/21/22  Shingrix: 4/6/22, 4/28/23  Jynneos: 8/3/22, 9/28/22      I communicated with the patient at this visit.  Patient was seen on ***  in ID clinic.    ***  minutes spent on the date of the encounter doing chart review, history and exam, documentation and further activities per the note    Bharathi Porter MD  Infectious Diseases      Subjective       HPI Interval events/updates:  4/26/22 update:  Patient says he's been doing well since last visit. Says he remains on Genvoya daily, only misses a dose ~1x/month at most. Takes it every morning. No side effects. No major life changes or health changes since last visit. No fevers/chills, no GI symptoms. Monogamous with . Only 1-2 drinks a week, no cigarettes. Has not seen any PCP recently, looking to get set up soon. He got Jynneos vaccine already. Says he's open to routine labs/STI screens next visit (but no swabs as says he's  low risk in a monogamous relationship with ). Also says he's interested in changing regimen to Biktarvy.     8/30/23 update:   Since last visit, started Biktarvy without any issues or side effects - only misses dose ~1x/month, takes in mornings, not taking other meds or vitamins.  No other changes - sexually active with symptoms, no other new symptoms.     HIV past medical history:  Diagnosed: 07/2009  Risk factors: MSM   Stanislav CD4:  234 (11%) in 07/2009  Genotypes: 7/16/09 HIV genotype/phenotype: no clinically relevant resistance mutations reported  Treatment history:  Started on Atripla 08/2009.  CD4 stanislav 234 (11%) in 07/2009. Switched to Genvoya 4/6/2017; changed to Biktarvy 4/26/23  Prior OIs:  None known  Supports:      ART Adherence:  Current regimen: Genvoya 1 tab daily  Missed doses in last week, month:  ~1x/month  Estimates adherence at:  99%  Medication side effects:  Never with Genvoya or Atripla  OTC medications: multivitamin    Social history:  Background: Originally from [place] ([place], now lives in [place])  Lives in/with:    Employment:  Works for [employer] -- [employer]  Education:   Interests:   International travel: none recently -- last visited 's parents in [place] 2019  Pets: none  Alcohol: occasional weekends  Tobacco:  Never regular smoker  Other substances:  never  Sexual Hx:  History of STI diagnosis and treatment: history of chlamydia  In a monogamous relationship with  -- serodiscordant ( has been on PrEP)      PAST MEDICAL HISTORY  Past Medical History:   Diagnosis Date    Depression     Human immunodeficiency virus (HIV) disease (H) Diagnosed July, 2009.    Started on Atripla 8/13/09.      Perirectal abscess October, 2010.    Responded to therapy with bactrim.   Otherwise as per HPI    PAST SURGICAL HISTORY  Denies history of surgeries  Otherwise as per HPI    ALLERGIES AND DRUG REACTIONS  Allergies   Allergen Reactions    Nkda [No  Known Drug Allergy]        FAMILY HISTORY  No known immunocompromising conditions or infections unless listed below  Mother with diabetes    SOCIAL AND FUNCTIONAL HISTORY  Social History     Tobacco Use    Smoking status: Former     Years: 3.00     Types: Cigarettes     Quit date: 2000     Years since quittin.9    Smokeless tobacco: Never   Substance Use Topics    Alcohol use: Yes     Comment: 2 drinks week, usually beer    Drug use: No     Socioeconomic History    Marital status:    As per HPI    CURRENT ANTIBIOTICS  Other medications reviewed in EPIC  Genvoya 1 tab daily      REVIEW OF SYSTEMS  Full 10 point ROS obtained, pertinent positives and negatives as above.      Objective   PHYSICAL EXAMINATION     weight is 75.1 kg (165 lb 9.6 oz). His oral temperature is 97.8  F (36.6  C). His blood pressure is 127/89 and his pulse is 84. His oxygen saturation is 98%.     Constitutional:  appearance not in acute distress  Eyes: no conjunctival injection, no scleral icterus  Cardiovascular: regular rate and rhythm  Pulmonary: unlabored breathing, clear to ascultation bilaterally  Gastrointestinal: abdomen non-distended   Musculoskeletal: normal bulk and tone  Extremities: warm and well perfused  Skin: no rashes visible on exposed skin  Neurologic: awake, alert and interactive      Other Significant Information (Labs, cultures, radiology, etc)    Recent micro:   09 HIV VL: 462,100  09 CD4 count: 234 (11%)  09 HBsAb: positive  09 HCV ab: negative  09 HAV ab: negative  09 CMV IgG ab: positive  09 toxo IgG ab: negative  09 syphilis ab: negative  09 quantiferon: negative  09 HIV genotype/phenotype: no clinically relevant resistance mutations reported  11 urine chlamydia: positive  16 HAV IgG ab: reactive  17 GC throat PCR: negative  17 urine chlamydia/gonorrhea: negtive  19 anal pap: negative cytology  20 Syphilis ab: negative  20 HIV  VL: undetectable  6/4/20 CD4 count: 808 (39%)   5/26/21 Syphilis ab: negative  5/26/21 HIV VL: undetecable  5/26/21 CD4 count: 1035 (41%)  3/28/22 syphilis screen: negative  3/28/22 HIV VL: undetectable  3/28/22 CD4 count 1219 (44%)  4/10/23 HIV viral load: undetectable  4/10/23 CD4 count 1056 (43%)    Shan Rashid is here today for HIV care follow up.      Assessment & Plan/Recommendations     Shan is a 48 year old male with PMH including HIV (diagnosed in 07/2009 with CD4 stanislav 234 (11%) at that time, started on Atripla 08/2009, then switched to Genvoya 4/6/2017, and now Biktarvy since 4/2023; well controlled on ART; previously followed with Dr. Pemberton), GERD, history of skin abscesses and prior perirectal abscess (s/p Bactrim course in 2010), history of depression (now improved), who presents to ID clinic for follow up of HIV care.      1. HIV, asymptomatic  - well controlled on antiretroviral therapy - on Biktarvy (FTC/TAF/BIC) without issues - will refill today  - already had routine HIV maintenance labs drawn before this visit which we reviewed together  - will order routine screening labs for next visit (but not interested in throat/rectal swabs as he is monogamous so low risk)  - follow up in approx 3 months for repeat labs and will discuss further vaccine boosters at that time      2.  Health maintenance  - patient amenable to getting Prevnar 20 vaccine this visit  - consider addressing other possible routine vaccine boosters next visit if available - influenza, COVID, MMR (depending on result of MMR titers being sent with next labs), meningococcal booster  - suggested he consider establishing care with PCP in future for remaining health maintenance management including blood pressure monitoring and other routine screenings         Colonoscopy: never  GC/Chlamydia: CT urine positive 12/2/11; last 12/22/17 negative CT/GC screen  Syphilis: last 8/3/23 negative   Quantiferon: last negative  8/3/23  G6PD: unknown  HLA-: unknown  Toxo: 7/7/09 negative  Crypto (if CD4<50): unknown  CMV: 7/16/09 IgG positive   Hep A : 4/7/16 reactive  Hep B: 7/7/09 HBsAb reactive  Hep C: 8/3/23 negative  Hep A vaccine: 5/6/10  Tdap vaccine: 6/2/2021  Flu vaccine: last 9/28/22  Prevnar-13 vaccine: 07/2014  Pneumovax-23 vaccine: given in 2009, booster 2015; booster due  Meningococcal MenACWY (Menveo, Menactra) vaccine: 8/13/15, 11/20/15, booster due (but declined because says low risk as is monogamous)  HPV vaccine: declined previously  MMR vaccine: unknown  COVID: 3/26/21, 4/16/21, 11/9/21, 9/21/22  Shingrix: 4/6/22, 4/28/23  Jynneos: 8/3/22, 9/28/22      I communicated with the patient at this visit.  Patient was seen on 8/30/23  in ID clinic.    35  minutes spent on the date of the encounter doing chart review, history and exam, documentation and further activities per the note    Bharathi Porter MD  Infectious Diseases      Subjective       HPI Interval events/updates:  8/30/23 update:   Since last visit, started Biktarvy without any issues or side effects - only misses dose ~1x/month, takes in mornings, not taking other meds or vitamins.  No other changes - sexually active with  only, no other new symptoms. Declines GC/CT swabs today - is in monogamous relationship low risk, but is ok with urine screening.     HIV past medical history:  Diagnosed: 07/2009  Risk factors: MSM   Arjun CD4:  234 (11%) in 07/2009  Genotypes: 7/16/09 HIV genotype/phenotype: no clinically relevant resistance mutations reported  Treatment history:  Started on Atripla 08/2009.  CD4 arjun 234 (11%) in 07/2009. Switched to Genvoya 4/6/2017; changed to Biktarvy 4/26/23  Prior OIs:  None known  Supports:      ART Adherence:  Current regimen: Biktarvy 1 tab daily  Missed doses in last week, month:  ~1x/month  Estimates adherence at:  99%  Medication side effects:  Never with Genvoya or Atripla  OTC medications: multivitamin    Social  history:  Background: Originally from [place] ([place]), now lives in [place]  Lives in/with:    Employment:  Works for [employer] -- [employer]  Education:   Interests:   International travel:  last visited 's parents in [place]   Pets: none  Alcohol: occasional weekends  Tobacco:  Never regular smoker  Other substances:  never  Sexual Hx:  History of STI diagnosis and treatment:  remote history of chlamydia ()  In a monogamous relationship with  -- serodiscordant ( has been on PrEP)      PAST MEDICAL HISTORY  Past Medical History:   Diagnosis Date     Depression      Human immunodeficiency virus (HIV) disease (H) Diagnosed .    Started on Atripla 09.       Perirectal abscess .    Responded to therapy with bactrim.   Otherwise as per HPI    PAST SURGICAL HISTORY  Denies history of surgeries  Otherwise as per HPI    ALLERGIES AND DRUG REACTIONS  Allergies   Allergen Reactions     Nkda [No Known Drug Allergy]        FAMILY HISTORY  No known immunocompromising conditions or infections unless listed below  Mother with diabetes    SOCIAL AND FUNCTIONAL HISTORY  Social History     Tobacco Use     Smoking status: Former     Years: 3.00     Types: Cigarettes     Quit date: 2000     Years since quittin.9     Smokeless tobacco: Never   Substance Use Topics     Alcohol use: Yes     Comment: 2 drinks week, usually beer     Drug use: No     Socioeconomic History     Marital status:    As per HPI    CURRENT ANTIBIOTICS  Other medications reviewed in EPIC  Biktarvy 1 tab daily      REVIEW OF SYSTEMS  8 point ROS obtained, pertinent positives and negatives as above.      Objective   PHYSICAL EXAMINATION     weight is 75.1 kg (165 lb 9.6 oz). His oral temperature is 97.8  F (36.6  C). His blood pressure is 127/89 and his pulse is 84. His oxygen saturation is 98%.     Constitutional:  appearance not in acute distress  Eyes: no  conjunctival injection, no scleral icterus  Pulmonary: unlabored breathing, clear to ascultation bilaterally  Gastrointestinal: abdomen non-distended   Musculoskeletal: normal bulk and tone  Extremities: warm and well perfused  Skin: no rashes visible on exposed skin  Neurologic: awake, alert and interactive      Other Significant Information (Labs, cultures, radiology, etc)    Recent micro:   7/7/09 HIV VL: 462,100  7/7/09 CD4 count: 234 (11%)  7/7/09 HBsAb: positive  7/7/09 HCV ab: negative  7/7/09 HAV ab: negative  7/7/09 CMV IgG ab: positive  7/7/09 toxo IgG ab: negative  7/7/09 syphilis ab: negative  7/16/09 quantiferon: negative  7/16/09 HIV genotype/phenotype: no clinically relevant resistance mutations reported  12/2/11 urine chlamydia: positive  4/7/16 HAV IgG ab: reactive  11/2/17 GC throat PCR: negative  12/22/17 urine chlamydia/gonorrhea: negtive  6/6/19 anal pap: negative cytology  6/4/20 Syphilis ab: negative  6/4/20 HIV VL: undetectable  6/4/20 CD4 count: 808 (39%)   5/26/21 Syphilis ab: negative  5/26/21 HIV VL: undetecable  5/26/21 CD4 count: 1035 (41%)  3/28/22 syphilis screen: negative  3/28/22 HIV VL: undetectable  3/28/22 CD4 count 1219 (44%)  4/10/23 HIV viral load: undetectable  4/10/23 CD4 count 1056 (43%)  8/3/23 quantiferon: negative  8/3/23 HCV screen: negative  8/3/23 syphilis screen: negative  8/3/23 HIV VL: 52      Again, thank you for allowing me to participate in the care of your patient.      Sincerely,    Bharathi Porter MD

## 2023-08-30 NOTE — PROGRESS NOTES
Shan Rashid is here today for HIV care follow up.      Assessment & Plan/Recommendations     Shan is a 48 year old male with PMH including HIV (diagnosed in 07/2009 with CD4 stanislav 234 (11%) at that time, started on Atripla 08/2009, then switched to Genvoya 4/6/2017, and now Biktarvy since 4/2023; well controlled on ART; previously followed with Dr. Pemberton), GERD, history of skin abscesses and prior perirectal abscess (s/p Bactrim course in 2010), history of depression (now improved), who presents to ID clinic for follow up of HIV care.      1. HIV, asymptomatic  - well controlled on antiretroviral therapy - on Biktarvy (FTC/TAF/BIC) without issues - will refill today  - already had routine HIV maintenance labs drawn before this visit which we reviewed together  - will order routine screening labs for next visit (but not interested in throat/rectal swabs as he is monogamous so low risk)  - follow up in approx 3 months for repeat labs and will discuss further vaccine boosters at that time      2.  Health maintenance  - patient amenable to getting Prevnar 20 vaccine this visit  - consider addressing other possible routine vaccine boosters next visit if available - influenza, COVID, MMR (depending on result of MMR titers being sent with next labs), meningococcal booster  - suggested he consider establishing care with PCP in future for remaining health maintenance management including blood pressure monitoring and other routine screenings         Colonoscopy: never  GC/Chlamydia: CT urine positive 12/2/11; last 12/22/17 negative CT/GC screen  Syphilis: last 8/3/23 negative   Quantiferon: last negative 8/3/23  G6PD: unknown  HLA-: unknown  Toxo: 7/7/09 negative  Crypto (if CD4<50): unknown  CMV: 7/16/09 IgG positive   Hep A : 4/7/16 reactive  Hep B: 7/7/09 HBsAb reactive  Hep C: 8/3/23 negative  Hep A vaccine: 5/6/10  Tdap vaccine: 6/2/2021  Flu vaccine: last 9/28/22  Prevnar-13 vaccine:  07/2014  Pneumovax-23 vaccine: given in 2009, booster 2015; booster due  Meningococcal MenACWY (Menveo, Menactra) vaccine: 8/13/15, 11/20/15, booster due (but declined because says low risk as is monogamous)  HPV vaccine: declined previously  MMR vaccine: unknown  COVID: 3/26/21, 4/16/21, 11/9/21, 9/21/22  Shingrix: 4/6/22, 4/28/23  Jynneos: 8/3/22, 9/28/22      I communicated with the patient at this visit.  Patient was seen on 8/30/23  in ID clinic.    35  minutes spent on the date of the encounter doing chart review, history and exam, documentation and further activities per the note    Bharathi Porter MD  Infectious Diseases      Subjective       HPI Interval events/updates:  8/30/23 update:   Since last visit, started Biktarvy without any issues or side effects - only misses dose ~1x/month, takes in mornings, not taking other meds or vitamins.  No other changes - sexually active with  only, no other new symptoms. Declines GC/CT swabs today - is in monogamous relationship low risk, but is ok with urine screening.     HIV past medical history:  Diagnosed: 07/2009  Risk factors: MSM   Arjun CD4:  234 (11%) in 07/2009  Genotypes: 7/16/09 HIV genotype/phenotype: no clinically relevant resistance mutations reported  Treatment history:  Started on Atripla 08/2009.  CD4 arjun 234 (11%) in 07/2009. Switched to Genvoya 4/6/2017; changed to Biktarvy 4/26/23  Prior OIs:  None known  Supports:      ART Adherence:  Current regimen: Biktarvy 1 tab daily  Missed doses in last week, month:  ~1x/month  Estimates adherence at:  99%  Medication side effects:  Never with Genvoya or Atripla  OTC medications: multivitamin    Social history:  Background: Originally from [place] ([place]), now lives in [place]  Lives in/with:    Employment:  Works for [employer] -- [employer]  Education:   Interests:   International travel:  last visited 's parents in [place] 2019  Pets: none  Alcohol: occasional  weekends  Tobacco:  Never regular smoker  Other substances:  never  Sexual Hx:  History of STI diagnosis and treatment:  remote history of chlamydia ()  In a monogamous relationship with  -- serodiscordant ( has been on PrEP)      PAST MEDICAL HISTORY  Past Medical History:   Diagnosis Date     Depression      Human immunodeficiency virus (HIV) disease (H) Diagnosed .    Started on Atripla 09.       Perirectal abscess .    Responded to therapy with bactrim.   Otherwise as per HPI    PAST SURGICAL HISTORY  Denies history of surgeries  Otherwise as per HPI    ALLERGIES AND DRUG REACTIONS  Allergies   Allergen Reactions     Nkda [No Known Drug Allergy]        FAMILY HISTORY  No known immunocompromising conditions or infections unless listed below  Mother with diabetes    SOCIAL AND FUNCTIONAL HISTORY  Social History     Tobacco Use     Smoking status: Former     Years: 3.00     Types: Cigarettes     Quit date: 2000     Years since quittin.9     Smokeless tobacco: Never   Substance Use Topics     Alcohol use: Yes     Comment: 2 drinks week, usually beer     Drug use: No     Socioeconomic History     Marital status:    As per HPI    CURRENT ANTIBIOTICS  Other medications reviewed in EPIC  Biktarvy 1 tab daily      REVIEW OF SYSTEMS  8 point ROS obtained, pertinent positives and negatives as above.      Objective   PHYSICAL EXAMINATION     weight is 75.1 kg (165 lb 9.6 oz). His oral temperature is 97.8  F (36.6  C). His blood pressure is 127/89 and his pulse is 84. His oxygen saturation is 98%.     Constitutional:  appearance not in acute distress  Eyes: no conjunctival injection, no scleral icterus  Pulmonary: unlabored breathing, clear to ascultation bilaterally  Gastrointestinal: abdomen non-distended   Musculoskeletal: normal bulk and tone  Extremities: warm and well perfused  Skin: no rashes visible on exposed skin  Neurologic: awake, alert and  interactive      Other Significant Information (Labs, cultures, radiology, etc)    Recent micro:   7/7/09 HIV VL: 462,100  7/7/09 CD4 count: 234 (11%)  7/7/09 HBsAb: positive  7/7/09 HCV ab: negative  7/7/09 HAV ab: negative  7/7/09 CMV IgG ab: positive  7/7/09 toxo IgG ab: negative  7/7/09 syphilis ab: negative  7/16/09 quantiferon: negative  7/16/09 HIV genotype/phenotype: no clinically relevant resistance mutations reported  12/2/11 urine chlamydia: positive  4/7/16 HAV IgG ab: reactive  11/2/17 GC throat PCR: negative  12/22/17 urine chlamydia/gonorrhea: negtive  6/6/19 anal pap: negative cytology  6/4/20 Syphilis ab: negative  6/4/20 HIV VL: undetectable  6/4/20 CD4 count: 808 (39%)   5/26/21 Syphilis ab: negative  5/26/21 HIV VL: undetecable  5/26/21 CD4 count: 1035 (41%)  3/28/22 syphilis screen: negative  3/28/22 HIV VL: undetectable  3/28/22 CD4 count 1219 (44%)  4/10/23 HIV viral load: undetectable  4/10/23 CD4 count 1056 (43%)  8/3/23 quantiferon: negative  8/3/23 HCV screen: negative  8/3/23 syphilis screen: negative  8/3/23 HIV VL: 52

## 2023-09-08 ENCOUNTER — TELEPHONE (OUTPATIENT)
Dept: INFECTIOUS DISEASES | Facility: CLINIC | Age: 48
End: 2023-09-08
Payer: COMMERCIAL

## 2023-09-08 NOTE — TELEPHONE ENCOUNTER
ALIDA RICE 9/8 to sched a 3 month (around 11/30) follow up with Dr. Porter with labs prior per checkout notes from 8/30.

## 2024-02-28 ENCOUNTER — LAB (OUTPATIENT)
Dept: LAB | Facility: CLINIC | Age: 49
End: 2024-02-28
Payer: COMMERCIAL

## 2024-02-28 DIAGNOSIS — B20 HUMAN IMMUNODEFICIENCY VIRUS (HIV) DISEASE (H): ICD-10-CM

## 2024-02-28 DIAGNOSIS — Z00.00 HEALTHCARE MAINTENANCE: ICD-10-CM

## 2024-02-28 LAB
ALBUMIN SERPL BCG-MCNC: 4.5 G/DL (ref 3.5–5.2)
ALP SERPL-CCNC: 73 U/L (ref 40–150)
ALT SERPL W P-5'-P-CCNC: 30 U/L (ref 0–70)
ANION GAP SERPL CALCULATED.3IONS-SCNC: 10 MMOL/L (ref 7–15)
AST SERPL W P-5'-P-CCNC: 26 U/L (ref 0–45)
BILIRUB SERPL-MCNC: 0.3 MG/DL
BUN SERPL-MCNC: 14.4 MG/DL (ref 6–20)
CALCIUM SERPL-MCNC: 9.2 MG/DL (ref 8.6–10)
CHLORIDE SERPL-SCNC: 100 MMOL/L (ref 98–107)
CREAT SERPL-MCNC: 1 MG/DL (ref 0.67–1.17)
DEPRECATED HCO3 PLAS-SCNC: 28 MMOL/L (ref 22–29)
EGFRCR SERPLBLD CKD-EPI 2021: >90 ML/MIN/1.73M2
ERYTHROCYTE [DISTWIDTH] IN BLOOD BY AUTOMATED COUNT: 13.3 % (ref 10–15)
GLUCOSE SERPL-MCNC: 83 MG/DL (ref 70–99)
HCT VFR BLD AUTO: 41.8 % (ref 40–53)
HGB BLD-MCNC: 14.2 G/DL (ref 13.3–17.7)
MCH RBC QN AUTO: 28.1 PG (ref 26.5–33)
MCHC RBC AUTO-ENTMCNC: 34 G/DL (ref 31.5–36.5)
MCV RBC AUTO: 83 FL (ref 78–100)
PLATELET # BLD AUTO: 242 10E3/UL (ref 150–450)
POTASSIUM SERPL-SCNC: 3.7 MMOL/L (ref 3.4–5.3)
PROT SERPL-MCNC: 7.3 G/DL (ref 6.4–8.3)
RBC # BLD AUTO: 5.06 10E6/UL (ref 4.4–5.9)
SODIUM SERPL-SCNC: 138 MMOL/L (ref 135–145)
WBC # BLD AUTO: 7.4 10E3/UL (ref 4–11)

## 2024-02-28 PROCEDURE — 80053 COMPREHEN METABOLIC PANEL: CPT | Performed by: PATHOLOGY

## 2024-02-28 PROCEDURE — 87536 HIV-1 QUANT&REVRSE TRNSCRPJ: CPT | Performed by: INTERNAL MEDICINE

## 2024-02-28 PROCEDURE — 86762 RUBELLA ANTIBODY: CPT | Performed by: INTERNAL MEDICINE

## 2024-02-28 PROCEDURE — 36415 COLL VENOUS BLD VENIPUNCTURE: CPT | Performed by: PATHOLOGY

## 2024-02-28 PROCEDURE — 87591 N.GONORRHOEAE DNA AMP PROB: CPT | Performed by: INTERNAL MEDICINE

## 2024-02-28 PROCEDURE — 99000 SPECIMEN HANDLING OFFICE-LAB: CPT | Performed by: PATHOLOGY

## 2024-02-28 PROCEDURE — 86359 T CELLS TOTAL COUNT: CPT | Performed by: INTERNAL MEDICINE

## 2024-02-28 PROCEDURE — 85027 COMPLETE CBC AUTOMATED: CPT | Performed by: PATHOLOGY

## 2024-02-28 PROCEDURE — 86765 RUBEOLA ANTIBODY: CPT | Performed by: INTERNAL MEDICINE

## 2024-02-28 PROCEDURE — 87491 CHLMYD TRACH DNA AMP PROBE: CPT | Performed by: INTERNAL MEDICINE

## 2024-02-28 PROCEDURE — 86735 MUMPS ANTIBODY: CPT | Performed by: INTERNAL MEDICINE

## 2024-02-29 LAB
C TRACH DNA SPEC QL NAA+PROBE: NEGATIVE
CD3 CELLS # BLD: 1833 CELLS/UL (ref 603–2990)
CD3 CELLS NFR BLD: 81 % (ref 49–84)
CD3+CD4+ CELLS # BLD: 1004 CELLS/UL (ref 441–2156)
CD3+CD4+ CELLS NFR BLD: 44 % (ref 28–63)
CD3+CD4+ CELLS/CD3+CD8+ CLL BLD: 1.43 % (ref 1.4–2.6)
CD3+CD8+ CELLS # BLD: 701 CELLS/UL (ref 125–1312)
CD3+CD8+ CELLS NFR BLD: 31 % (ref 10–40)
HIV1 RNA # PLAS NAA DL=20: 58 COPIES/ML
HIV1 RNA SERPL NAA+PROBE-LOG#: 1.8 {LOG_COPIES}/ML
MEV IGG SER IA-ACNC: 126 AU/ML
MEV IGG SER IA-ACNC: POSITIVE
MUMPS ANTIBODY IGG INSTRUMENT VALUE: 22.6 AU/ML
MUV IGG SER QL IA: POSITIVE
N GONORRHOEA DNA SPEC QL NAA+PROBE: NEGATIVE
RUBV IGG SERPL QL IA: 7.65 INDEX
RUBV IGG SERPL QL IA: POSITIVE
T CELL COMMENT: NORMAL

## 2024-03-13 ENCOUNTER — OFFICE VISIT (OUTPATIENT)
Dept: INFECTIOUS DISEASES | Facility: CLINIC | Age: 49
End: 2024-03-13
Attending: INTERNAL MEDICINE
Payer: COMMERCIAL

## 2024-03-13 VITALS
SYSTOLIC BLOOD PRESSURE: 148 MMHG | HEART RATE: 83 BPM | TEMPERATURE: 98.1 F | BODY MASS INDEX: 24.59 KG/M2 | HEIGHT: 69 IN | DIASTOLIC BLOOD PRESSURE: 88 MMHG | WEIGHT: 166 LBS

## 2024-03-13 DIAGNOSIS — B20 HUMAN IMMUNODEFICIENCY VIRUS (HIV) DISEASE (H): ICD-10-CM

## 2024-03-13 DIAGNOSIS — Z00.00 HEALTHCARE MAINTENANCE: ICD-10-CM

## 2024-03-13 PROCEDURE — 90619 MENACWY-TT VACCINE IM: CPT | Performed by: INTERNAL MEDICINE

## 2024-03-13 PROCEDURE — 99214 OFFICE O/P EST MOD 30 MIN: CPT | Performed by: INTERNAL MEDICINE

## 2024-03-13 PROCEDURE — 90471 IMMUNIZATION ADMIN: CPT | Performed by: INTERNAL MEDICINE

## 2024-03-13 PROCEDURE — 250N000011 HC RX IP 250 OP 636: Performed by: INTERNAL MEDICINE

## 2024-03-13 RX ADMIN — NEISSERIA MENINGITIDIS GROUP A CAPSULAR POLYSACCHARIDE TETANUS TOXOID CONJUGATE ANTIGEN, NEISSERIA MENINGITIDIS GROUP C CAPSULAR POLYSACCHARIDE TETANUS TOXOID CONJUGATE ANTIGEN, NEISSERIA MENINGITIDIS GROUP Y CAPSULAR POLYSACCHARIDE TETANUS TOXOID CONJUGATE ANTIGEN, AND NEISSERIA MENINGITIDIS GROUP W-135 CAPSULAR POLYSACCHARIDE TETANUS TOXOID CONJUGATE ANTIGEN 0.5 ML: 10; 10; 10; 10 INJECTION, SOLUTION INTRAMUSCULAR at 15:02

## 2024-03-13 ASSESSMENT — PAIN SCALES - GENERAL: PAINLEVEL: NO PAIN (0)

## 2024-03-13 NOTE — PROGRESS NOTES
Shan Rashid is here today for HIV care follow up.      Assessment & Plan/Recommendations     Shan is a 49 year old male with PMH including HIV (diagnosed in 07/2009 with CD4 stanislav 234 (11%) at that time, started on Atripla 08/2009, then switched to Genvoya 4/6/2017, and now Biktarvy since 4/2023; well controlled on ART; previously followed with Dr. Pemberton), GERD, history of skin abscesses and prior perirectal abscess (s/p Bactrim course in 2010), history of depression (now improved), who presents to ID clinic for follow up of HIV care.      1. HIV, asymptomatic  - well controlled on antiretroviral therapy - on Biktarvy (FTC/TAF/BIC) without issues - will refill today  - already had routine HIV maintenance labs drawn before this visit which we reviewed together  - will order routine screening labs for next visit (but not interested in throat/rectal swabs as he is monogamous so low risk); will also hold off on CD4 count with next labs as it has been stable  - follow up in approx 3-4 months for repeat labs and will discuss further vaccine boosters at that time      2.  Health maintenance  - patient amenable to getting meningococcal booster this visit  - consider addressing other possible routine vaccine boosters next visit if available - COVID booster (if not done already)  - suggested he consider establishing care with PCP in future for remaining health maintenance management including blood pressure monitoring and other routine screenings         Colonoscopy: never  Anal pap: last 6/2019  GC/Chlamydia: CT urine positive 12/2/11; last 12/22/17 negative CT/GC screen  Syphilis: last 8/3/23 negative   Quantiferon: last negative 8/3/23  G6PD: unknown  HLA-: unknown  Toxo: 7/7/09 negative  Crypto (if CD4<50): unknown  CMV: 7/16/09 IgG positive   Hep A : 4/7/16 reactive  Hep B: 7/7/09 HBsAb reactive  Hep C: 8/3/23 negative  Hep A vaccine: 5/6/10  Tdap vaccine: 6/2/2021  Flu vaccine: last 9/28/22  Prevnar  vaccine: 07/2014, Prevar-20 (8/2023)  Pneumovax-23 vaccine: given in 2009, booster 2015  Meningococcal MenACWY (Menveo, Menactra) vaccine: 8/13/15, 11/20/15, 3/2024  HPV vaccine: declined previously  MMR vaccine: immune on titers 2/2024  COVID: 3/26/21, 4/16/21, 11/9/21, 9/21/22  Shingrix: 4/6/22, 4/28/23  Jynneos: 8/3/22, 9/28/22      I communicated with the patient at this visit.  Patient was seen on 3/13/24in ID clinic.    35 minutes spent on the date of the encounter doing chart review, history and exam, documentation and further activities per the note    Bharathi Porter MD  Infectious Diseases      Subjective       HPI Interval events/updates:  3/13/24: Still taking Biktarvy without issues, only misses dose rarely, no new symptoms. Syphilis screen ok, declines covid vaccine, ok with getting meningococcal vaccine today. Declines STI swabs today (monogamous with , asymptomatic). Repeat labs prior to next visit, discussed and will hold off on checking CD4 count next visit though as been stable. Has home BP cuff and is planning to start seeing primary care provider.     HIV past medical history:  Diagnosed: 07/2009  Risk factors: MSM   Arjun CD4:  234 (11%) in 07/2009  Genotypes: 7/16/09 HIV genotype/phenotype: no clinically relevant resistance mutations reported  Treatment history:  Started on Atripla 08/2009.  CD4 arjun 234 (11%) in 07/2009. Switched to Genvoya 4/6/2017; changed to Biktarvy 4/26/23  Prior OIs:  None known  Supports:      ART Adherence:  Current regimen: Biktarvy 1 tab daily  Missed doses in last week, month:  ~1x/month  Estimates adherence at:  99%  Medication side effects:  Never with Genvoya or Atripla  OTC medications: multivitamin    Social history:  Background: Originally from [place] ([place]), now lives in [place]  Lives in/with:    Employment:  Works for [employer] at [employer]  Education:   Interests:   International travel:  last visited 's  "parents in [place] 2019  Pets: none  Alcohol: occasional weekends  Tobacco:  Never regular smoker  Other substances:  never  Sexual Hx:  History of STI diagnosis and treatment:  remote history of chlamydia ()  In a monogamous relationship with  -- serodiscordant ( has been on PrEP)      PAST MEDICAL HISTORY  Past Medical History:   Diagnosis Date     Depression      Human immunodeficiency virus (HIV) disease (H) Diagnosed .    Started on Atripla 09.       Perirectal abscess .    Responded to therapy with bactrim.   Otherwise as per HPI    PAST SURGICAL HISTORY  Denies history of surgeries  Otherwise as per HPI    ALLERGIES AND DRUG REACTIONS  Allergies   Allergen Reactions     Nkda [No Known Drug Allergy]        FAMILY HISTORY  No known immunocompromising conditions or infections unless listed below  Mother with diabetes    SOCIAL AND FUNCTIONAL HISTORY  Social History     Tobacco Use     Smoking status: Former     Years: 3     Types: Cigarettes     Quit date: 2000     Years since quittin.4     Smokeless tobacco: Never   Substance Use Topics     Alcohol use: Yes     Comment: 2 drinks week, usually beer     Drug use: No     Socioeconomic History     Marital status:    As per HPI    CURRENT ANTIBIOTICS  Other medications reviewed in EPIC  Biktarvy 1 tab daily      REVIEW OF SYSTEMS  8 point ROS obtained, pertinent positives and negatives as above.      Objective   PHYSICAL EXAMINATION     height is 1.753 m (5' 9\") and weight is 75.3 kg (166 lb). His oral temperature is 98.1  F (36.7  C). His blood pressure is 148/88 (abnormal) and his pulse is 83.     Constitutional:  appearance not in acute distress  ENT: no conjunctival injection, no scleral icterus; no thrush  Neck: no lymphadenopathy  Pulmonary: unlabored breathing, clear to ascultation bilaterally  Gastrointestinal: abdomen non-distended   Musculoskeletal: normal bulk and tone  Skin: no rashes " visible on exposed skin  Neurologic: awake, alert and interactive      Other Significant Information (Labs, cultures, radiology, etc)    Recent micro:   7/7/09 HIV VL: 462,100  7/7/09 CD4 count: 234 (11%)  7/7/09 HBsAb: positive  7/7/09 HCV ab: negative  7/7/09 HAV ab: negative  7/7/09 CMV IgG ab: positive  7/7/09 toxo IgG ab: negative  7/7/09 syphilis ab: negative  7/16/09 quantiferon: negative  7/16/09 HIV genotype/phenotype: no clinically relevant resistance mutations reported  12/2/11 urine chlamydia: positive  4/7/16 HAV IgG ab: reactive  11/2/17 GC throat PCR: negative  12/22/17 urine chlamydia/gonorrhea: negtive  6/6/19 anal pap: negative cytology  6/4/20 Syphilis ab: negative  6/4/20 HIV VL: undetectable  6/4/20 CD4 count: 808 (39%)   5/26/21 Syphilis ab: negative  5/26/21 HIV VL: undetecable  5/26/21 CD4 count: 1035 (41%)  3/28/22 syphilis screen: negative  3/28/22 HIV VL: undetectable  3/28/22 CD4 count 1219 (44%)  4/10/23 HIV viral load: undetectable  4/10/23 CD4 count 1056 (43%)  8/3/23 quantiferon: negative  8/3/23 HCV screen: negative  8/3/23 syphilis screen: negative  8/3/23 HIV VL: 52  2/28/24 CD4 count 1004  2/28/24 HIV VL 58  2/28/24 urine gonorrhea/chlamydia: negative

## 2024-03-13 NOTE — Clinical Note
3/13/2024       RE: Shan Rashid  [address]  [facility] [address]     Dear Colleague,    Thank you for referring your patient, Shan Rashid, to the [facility] [place] at [facility]. Please see a copy of my visit note below.    Shan aRshid is here today for HIV care follow up.      Assessment & Plan/Recommendations     Shan is a 49 year old male with PMH including HIV (diagnosed in 07/2009 with CD4 stanislav 234 (11%) at that time, started on Atripla 08/2009, then switched to Genvoya 4/6/2017, and now Biktarvy since 4/2023; well controlled on ART; previously followed with Dr. Pemberton), GERD, history of skin abscesses and prior perirectal abscess (s/p Bactrim course in 2010), history of depression (now improved), who presents to ID clinic for follow up of HIV care.      1. HIV, asymptomatic  - well controlled on antiretroviral therapy - on Biktarvy (FTC/TAF/BIC) without issues - will refill today  - already had routine HIV maintenance labs drawn before this visit which we reviewed together  - will order routine screening labs for next visit (but not interested in throat/rectal swabs as he is monogamous so low risk); will also hold off on CD4 count with next labs as it has been stable  - follow up in approx 3-4 months for repeat labs and will discuss further vaccine boosters at that time      2.  Health maintenance  - patient amenable to getting meningococcal booster this visit  - consider addressing other possible routine vaccine boosters next visit if available - COVID booster (if not done already)  - suggested he consider establishing care with PCP in future for remaining health maintenance management including blood pressure monitoring and other routine screenings         Colonoscopy: never  Anal pap: last 6/2019  GC/Chlamydia: CT urine positive 12/2/11; last 12/22/17 negative CT/GC screen  Syphilis: last  8/3/23 negative   Quantiferon: last negative 8/3/23  G6PD: unknown  HLA-: unknown  Toxo: 7/7/09 negative  Crypto (if CD4<50): unknown  CMV: 7/16/09 IgG positive   Hep A : 4/7/16 reactive  Hep B: 7/7/09 HBsAb reactive  Hep C: 8/3/23 negative  Hep A vaccine: 5/6/10  Tdap vaccine: 6/2/2021  Flu vaccine: last 9/28/22  Prevnar vaccine: 07/2014, Prevar-20 (8/2023)  Pneumovax-23 vaccine: given in 2009, booster 2015  Meningococcal MenACWY (Menveo, Menactra) vaccine: 8/13/15, 11/20/15, 3/2024  HPV vaccine: declined previously  MMR vaccine: immune on titers 2/2024  COVID: 3/26/21, 4/16/21, 11/9/21, 9/21/22  Shingrix: 4/6/22, 4/28/23  Jynneos: 8/3/22, 9/28/22      I communicated with the patient at this visit.  Patient was seen on 3/13/24in ID clinic.    35 minutes spent on the date of the encounter doing chart review, history and exam, documentation and further activities per the note    Bharathi Porter MD  Infectious Diseases      Subjective       HPI Interval events/updates:  3/13/24: Still taking Biktarvy without issues, only misses dose rarely, no new symptoms. Syphilis screen ok, declines covid vaccine, ok with getting meningococcal vaccine today. Declines STI swabs today (monogamous with , asymptomatic). Repeat labs prior to next visit, discussed and will hold off on checking CD4 count next visit though as been stable. Has home BP cuff and is planning to start seeing primary care provider.     HIV past medical history:  Diagnosed: 07/2009  Risk factors: MSM   Arjun CD4:  234 (11%) in 07/2009  Genotypes: 7/16/09 HIV genotype/phenotype: no clinically relevant resistance mutations reported  Treatment history:  Started on Atripla 08/2009.  CD4 arjun 234 (11%) in 07/2009. Switched to Genvoya 4/6/2017; changed to Biktarvy 4/26/23  Prior OIs:  None known  Supports:      ART Adherence:  Current regimen: Biktarvy 1 tab daily  Missed doses in last week, month:  ~1x/month  Estimates adherence at:  99%  Medication  "side effects:  Never with Genvoya or Atripla  OTC medications: multivitamin    Social history:  Background: Originally from [place] ([place]), now lives in [place]  Lives in/with:    Employment:  Works for [employer] at [employer]  Education:   Interests:   International travel:  last visited 's parents in [place]   Pets: none  Alcohol: occasional weekends  Tobacco:  Never regular smoker  Other substances:  never  Sexual Hx:  History of STI diagnosis and treatment:  remote history of chlamydia ()  In a monogamous relationship with  -- serodiscordant ( has been on PrEP)      PAST MEDICAL HISTORY  Past Medical History:   Diagnosis Date     Depression      Human immunodeficiency virus (HIV) disease (H) Diagnosed .    Started on Atripla 09.       Perirectal abscess .    Responded to therapy with bactrim.   Otherwise as per HPI    PAST SURGICAL HISTORY  Denies history of surgeries  Otherwise as per HPI    ALLERGIES AND DRUG REACTIONS  Allergies   Allergen Reactions     Nkda [No Known Drug Allergy]        FAMILY HISTORY  No known immunocompromising conditions or infections unless listed below  Mother with diabetes    SOCIAL AND FUNCTIONAL HISTORY  Social History     Tobacco Use     Smoking status: Former     Years: 3     Types: Cigarettes     Quit date: 2000     Years since quittin.4     Smokeless tobacco: Never   Substance Use Topics     Alcohol use: Yes     Comment: 2 drinks week, usually beer     Drug use: No     Socioeconomic History     Marital status:    As per HPI    CURRENT ANTIBIOTICS  Other medications reviewed in EPIC  Biktarvy 1 tab daily      REVIEW OF SYSTEMS  8 point ROS obtained, pertinent positives and negatives as above.      Objective   PHYSICAL EXAMINATION     height is 1.753 m (5' 9\") and weight is 75.3 kg (166 lb). His oral temperature is 98.1  F (36.7  C). His blood pressure is 148/88 (abnormal) " and his pulse is 83.     Constitutional:  appearance not in acute distress  ENT: no conjunctival injection, no scleral icterus; no thrush  Neck: no lymphadenopathy  Pulmonary: unlabored breathing, clear to ascultation bilaterally  Gastrointestinal: abdomen non-distended   Musculoskeletal: normal bulk and tone  Skin: no rashes visible on exposed skin  Neurologic: awake, alert and interactive      Other Significant Information (Labs, cultures, radiology, etc)    Recent micro:   7/7/09 HIV VL: 462,100  7/7/09 CD4 count: 234 (11%)  7/7/09 HBsAb: positive  7/7/09 HCV ab: negative  7/7/09 HAV ab: negative  7/7/09 CMV IgG ab: positive  7/7/09 toxo IgG ab: negative  7/7/09 syphilis ab: negative  7/16/09 quantiferon: negative  7/16/09 HIV genotype/phenotype: no clinically relevant resistance mutations reported  12/2/11 urine chlamydia: positive  4/7/16 HAV IgG ab: reactive  11/2/17 GC throat PCR: negative  12/22/17 urine chlamydia/gonorrhea: negtive  6/6/19 anal pap: negative cytology  6/4/20 Syphilis ab: negative  6/4/20 HIV VL: undetectable  6/4/20 CD4 count: 808 (39%)   5/26/21 Syphilis ab: negative  5/26/21 HIV VL: undetecable  5/26/21 CD4 count: 1035 (41%)  3/28/22 syphilis screen: negative  3/28/22 HIV VL: undetectable  3/28/22 CD4 count 1219 (44%)  4/10/23 HIV viral load: undetectable  4/10/23 CD4 count 1056 (43%)  8/3/23 quantiferon: negative  8/3/23 HCV screen: negative  8/3/23 syphilis screen: negative  8/3/23 HIV VL: 52  2/28/24 CD4 count 1004  2/28/24 HIV VL 58  2/28/24 urine gonorrhea/chlamydia: negative        Again, thank you for allowing me to participate in the care of your patient.      Sincerely,    Bharathi Porter MD

## 2024-03-13 NOTE — NURSING NOTE
"Chief Complaint   Patient presents with    Follow Up     B20     BP (!) 148/88   Pulse 83   Temp 98.1  F (36.7  C) (Oral)   Ht 1.753 m (5' 9\")   Wt 75.3 kg (166 lb)   BMI 24.51 kg/m    Charlene Walsh MA on 3/13/2024 at 2:34 PM    "

## 2024-03-15 ENCOUNTER — TELEPHONE (OUTPATIENT)
Dept: INFECTIOUS DISEASES | Facility: CLINIC | Age: 49
End: 2024-03-15
Payer: COMMERCIAL

## 2024-03-15 NOTE — TELEPHONE ENCOUNTER
Left Voicemail (1st Attempt) for the patient to call back and schedule the following:    Appointment type: Return  Provider: German  Return date: 06/2024  Specialty phone number: [phone]  Additional appointment(s) needed: labs beforehand  Additonal Notes: na

## 2024-03-18 ENCOUNTER — TELEPHONE (OUTPATIENT)
Dept: INFECTIOUS DISEASES | Facility: CLINIC | Age: 49
End: 2024-03-18
Payer: COMMERCIAL

## 2024-03-18 NOTE — TELEPHONE ENCOUNTER
Left Voicemail (2nd Attempt) for the patient to call back and schedule the following:    Appointment type: Return  Provider: German  Return date: 06/2024  Specialty phone number: [phone]  Additional appointment(s) needed: labs  Additonal Notes: na

## 2024-04-02 ENCOUNTER — OFFICE VISIT (OUTPATIENT)
Dept: URGENT CARE | Facility: URGENT CARE | Age: 49
End: 2024-04-02
Payer: COMMERCIAL

## 2024-04-02 VITALS
BODY MASS INDEX: 25.76 KG/M2 | HEIGHT: 68 IN | RESPIRATION RATE: 16 BRPM | TEMPERATURE: 98.2 F | DIASTOLIC BLOOD PRESSURE: 82 MMHG | HEART RATE: 86 BPM | SYSTOLIC BLOOD PRESSURE: 133 MMHG | WEIGHT: 170 LBS | OXYGEN SATURATION: 99 %

## 2024-04-02 DIAGNOSIS — R07.0 THROAT PAIN: Primary | ICD-10-CM

## 2024-04-02 LAB
DEPRECATED S PYO AG THROAT QL EIA: NEGATIVE
GROUP A STREP BY PCR: NOT DETECTED

## 2024-04-02 PROCEDURE — 99213 OFFICE O/P EST LOW 20 MIN: CPT | Performed by: NURSE PRACTITIONER

## 2024-04-02 PROCEDURE — 87651 STREP A DNA AMP PROBE: CPT | Performed by: NURSE PRACTITIONER

## 2024-04-02 ASSESSMENT — PAIN SCALES - GENERAL: PAINLEVEL: MODERATE PAIN (5)

## 2024-04-02 NOTE — PATIENT INSTRUCTIONS
Rapid strep test today is negative.   Your throat culture is pending. Urgent Care will call if positive results to start antibiotics at that time; No call if the culture is negative.  Drink plenty of fluids and rest.  May use salt water gargles- about 8 oz warm water with about 1 teaspoon salt  Sucrets and Cepacol spray are over the counter medications that numb the throat.  Over the counter pain relievers such as tylenol or ibuprofen may be used as needed.   Mucinex is product known to help loosen congestion and thin mucus (generic is guaifenesin)   Delsym 12 hour over the counter works well for cough.  Honey has been shown to be helpful in cough management and is soothing to a sore throat. May add to lemon tea.  Please follow up with primary care provider if not improving, worsening or new symptoms.  
ambulatory

## 2024-04-02 NOTE — PROGRESS NOTES
"Chief Complaint   Patient presents with    Pharyngitis     X1 day of sore throat     Nasal Congestion     X1 day of congestion     Urgent Care     No fever      SUBJECTIVE:  Shan Rashid is a 49 year old male presenting with sore throat congestion since yesterday.  No fever or trismus potato voice uvular deviation.  No known sick contacts.  Declines concerns for chlamydia gonorrhea COVID flu.    Past Medical History:   Diagnosis Date    Depression     Human immunodeficiency virus (HIV) disease (H) Diagnosed .    Started on Atripla 09.      Perirectal abscess .    Responded to therapy with bactrim.     Current Outpatient Medications   Medication Sig Dispense Refill    bictegravir-emtricitabine-tenofovir (BIKTARVY) -25 MG per tablet Take 1 tablet by mouth daily 30 tablet 5     No current facility-administered medications for this visit.     Social History     Tobacco Use    Smoking status: Former     Years: 3     Types: Cigarettes     Quit date: 2000     Years since quittin.5    Smokeless tobacco: Never   Substance Use Topics    Alcohol use: Yes     Comment: 2 drinks week, usually beer     Allergies   Allergen Reactions    Nkda [No Known Drug Allergy]        Review of Systems  All systems negative except those listed above in HPI.    OBJECTIVE:   /82   Pulse 86   Temp 98.2  F (36.8  C) (Oral)   Resp 16   Ht 1.727 m (5' 8\")   Wt 77.1 kg (170 lb)   SpO2 99%   BMI 25.85 kg/m      Physical Exam  Vitals reviewed.   Constitutional:       General: He is not in acute distress.     Appearance: Normal appearance. He is not ill-appearing, toxic-appearing or diaphoretic.   HENT:      Head: Normocephalic and atraumatic.      Nose: Nose normal.      Mouth/Throat:      Mouth: Mucous membranes are moist.      Pharynx: Posterior oropharyngeal erythema present. No oropharyngeal exudate.   Cardiovascular:      Rate and Rhythm: Normal rate.      Pulses: Normal pulses. "   Pulmonary:      Effort: Pulmonary effort is normal.      Breath sounds: Normal breath sounds.   Abdominal:      General: Abdomen is flat.      Palpations: Abdomen is soft.   Musculoskeletal:         General: Normal range of motion.      Cervical back: Normal range of motion and neck supple.   Lymphadenopathy:      Cervical: Cervical adenopathy present.   Skin:     General: Skin is warm and dry.      Findings: No rash.   Neurological:      General: No focal deficit present.      Mental Status: He is alert and oriented to person, place, and time.   Psychiatric:         Mood and Affect: Mood normal.         Behavior: Behavior normal.       Results for orders placed or performed in visit on 04/02/24   Streptococcus A Rapid Screen w/Reflex to PCR - Clinic Collect     Status: Normal    Specimen: Throat; Swab   Result Value Ref Range    Group A Strep antigen Negative Negative     ASSESSMENT:    ICD-10-CM    1. Throat pain  R07.0 Streptococcus A Rapid Screen w/Reflex to PCR - Clinic Collect     Group A Streptococcus PCR Throat Swab        PLAN:     Rapid strep test today is negative.   Your throat culture is pending. Urgent Care will call if positive results to start antibiotics at that time; No call if the culture is negative.  Drink plenty of fluids and rest.  May use salt water gargles- about 8 oz warm water with about 1 teaspoon salt  Sucrets and Cepacol spray are over the counter medications that numb the throat.  Over the counter pain relievers such as tylenol or ibuprofen may be used as needed.   Mucinex is product known to help loosen congestion and thin mucus (generic is guaifenesin)   Delsym 12 hour over the counter works well for cough.  Honey has been shown to be helpful in cough management and is soothing to a sore throat. May add to lemon tea.  Please follow up with primary care provider if not improving, worsening or new symptoms.    Follow up with primary care provider with any problems, questions or  concerns or if symptoms worsen or fail to improve. Patient agreed to plan and verbalized understanding.    Josefina Salomon, AK[facility]

## 2024-06-05 ENCOUNTER — LAB (OUTPATIENT)
Dept: LAB | Facility: CLINIC | Age: 49
End: 2024-06-05
Payer: COMMERCIAL

## 2024-06-05 ENCOUNTER — OFFICE VISIT (OUTPATIENT)
Dept: URGENT CARE | Facility: URGENT CARE | Age: 49
End: 2024-06-05
Payer: COMMERCIAL

## 2024-06-05 VITALS
HEART RATE: 76 BPM | WEIGHT: 162 LBS | TEMPERATURE: 97.8 F | SYSTOLIC BLOOD PRESSURE: 108 MMHG | HEIGHT: 69 IN | OXYGEN SATURATION: 98 % | DIASTOLIC BLOOD PRESSURE: 80 MMHG | BODY MASS INDEX: 23.99 KG/M2 | RESPIRATION RATE: 15 BRPM

## 2024-06-05 DIAGNOSIS — Z00.00 HEALTHCARE MAINTENANCE: ICD-10-CM

## 2024-06-05 DIAGNOSIS — L03.213 PERIORBITAL CELLULITIS OF RIGHT EYE: Primary | ICD-10-CM

## 2024-06-05 DIAGNOSIS — B20 HUMAN IMMUNODEFICIENCY VIRUS (HIV) DISEASE (H): ICD-10-CM

## 2024-06-05 LAB
ALBUMIN SERPL BCG-MCNC: 4.5 G/DL (ref 3.5–5.2)
ALP SERPL-CCNC: 72 U/L (ref 40–150)
ALT SERPL W P-5'-P-CCNC: 30 U/L (ref 0–70)
ANION GAP SERPL CALCULATED.3IONS-SCNC: 12 MMOL/L (ref 7–15)
AST SERPL W P-5'-P-CCNC: 26 U/L (ref 0–45)
BILIRUB SERPL-MCNC: 0.4 MG/DL
BUN SERPL-MCNC: 14.5 MG/DL (ref 6–20)
CALCIUM SERPL-MCNC: 9.3 MG/DL (ref 8.6–10)
CHLORIDE SERPL-SCNC: 101 MMOL/L (ref 98–107)
CREAT SERPL-MCNC: 1.08 MG/DL (ref 0.67–1.17)
DEPRECATED HCO3 PLAS-SCNC: 25 MMOL/L (ref 22–29)
EGFRCR SERPLBLD CKD-EPI 2021: 85 ML/MIN/1.73M2
ERYTHROCYTE [DISTWIDTH] IN BLOOD BY AUTOMATED COUNT: 13.4 % (ref 10–15)
GLUCOSE SERPL-MCNC: 88 MG/DL (ref 70–99)
HCT VFR BLD AUTO: 40 % (ref 40–53)
HGB BLD-MCNC: 13.9 G/DL (ref 13.3–17.7)
MCH RBC QN AUTO: 28.4 PG (ref 26.5–33)
MCHC RBC AUTO-ENTMCNC: 34.8 G/DL (ref 31.5–36.5)
MCV RBC AUTO: 82 FL (ref 78–100)
PLATELET # BLD AUTO: 249 10E3/UL (ref 150–450)
POTASSIUM SERPL-SCNC: 3.8 MMOL/L (ref 3.4–5.3)
PROT SERPL-MCNC: 7.1 G/DL (ref 6.4–8.3)
RBC # BLD AUTO: 4.9 10E6/UL (ref 4.4–5.9)
SODIUM SERPL-SCNC: 138 MMOL/L (ref 135–145)
T PALLIDUM AB SER QL: NONREACTIVE
WBC # BLD AUTO: 6.1 10E3/UL (ref 4–11)

## 2024-06-05 PROCEDURE — 87491 CHLMYD TRACH DNA AMP PROBE: CPT | Performed by: INTERNAL MEDICINE

## 2024-06-05 PROCEDURE — 86780 TREPONEMA PALLIDUM: CPT | Performed by: INTERNAL MEDICINE

## 2024-06-05 PROCEDURE — 87536 HIV-1 QUANT&REVRSE TRNSCRPJ: CPT | Performed by: INTERNAL MEDICINE

## 2024-06-05 PROCEDURE — 85027 COMPLETE CBC AUTOMATED: CPT | Performed by: PATHOLOGY

## 2024-06-05 PROCEDURE — 99213 OFFICE O/P EST LOW 20 MIN: CPT

## 2024-06-05 PROCEDURE — 99000 SPECIMEN HANDLING OFFICE-LAB: CPT | Performed by: PATHOLOGY

## 2024-06-05 PROCEDURE — 87591 N.GONORRHOEAE DNA AMP PROB: CPT | Performed by: INTERNAL MEDICINE

## 2024-06-05 PROCEDURE — 80053 COMPREHEN METABOLIC PANEL: CPT | Performed by: PATHOLOGY

## 2024-06-05 PROCEDURE — 36415 COLL VENOUS BLD VENIPUNCTURE: CPT | Performed by: PATHOLOGY

## 2024-06-05 ASSESSMENT — VISUAL ACUITY: OU: 1

## 2024-06-05 NOTE — PROGRESS NOTES
"Patient presents with:  Urgent Care: Right eye, upper lid red and irritated.       Clinical Decision Makin yr old well appearing male presenting with right upper eye lid swelling, redness, tenderness x 2-3 days. Reassuringly without oculomotor pain, without sensitivity to light, without fevers or chills, otherwise feeling well. Exam is remarkable for hordeolum right upper eye lid with surrounding erythema, swelling, and he is tender upon palpation of lid. Eye itself does not appear to be tender.    Rx Augmentin bid x 7 days for periorbital cellulitis.  Discussed supportive care.     At the end of the encounter, I discussed results, diagnosis, medications. Discussed red flags for immediate return to clinic/ER, as well as indications for follow up if no improvement. Patient understood and agreed to plan. Patient was stable for discharge.    ICD-10-CM    1. Periorbital cellulitis of right eye  L03.213 amoxicillin-clavulanate (AUGMENTIN) 875-125 MG tablet          Patient Instructions   I am concerned about the redness and swelling of your right upper eye lid - for this, please take antibiotic - Augmentin, twice daily for a total of 5 days.     I also suspect you have a stye.     A stye is a red and painful lump on the eyelid. It happens when a small gland on the edge of the eyelid gets infected or inflamed. Styes can form on the upper or lower eyelids. Most styes get better on their own after a few days to a week. The medical term for stye is \"hordeolum.\"    People sometimes get a stye confused with a different eye problem called a \"chalazion.\" A chalazion also causes a lump on the eyelid. But a stye is caused by an infection and is painful. A chalazion is not tender or painful, but it often lasts longer than a stye does.    A stye usually develops over a few days. Styes can cause other symptoms, too, such as tearing and eyelid pain and swelling.     Put a warm, wet compress on the stye. Wet a clean washcloth " with warm water, and put it over your stye. When the washcloth cools, reheat it with warm water and put it back over the stye. Repeat these steps for about 15 minutes, and try to do this 4 times a day.    ?It might help to gently massage your eyelid.  ?Do not squeeze or try to pop your stye. This can make it worse.  ?Do not wear eye makeup or contact lenses until your stye is gone.    To lower your chances of getting a stye, you can:  ?Wash your hands often - It's especially important to wash your hands before you touch your eyes. Also, if you wear contact lenses, keep them clean and wash your hands before you put them in.  ?Be careful with your eye makeup - Wearing eye makeup can sometimes cause a stye. Remove your eye makeup each night, and throw away old makeup. Do not share eye makeup with other people.    Please return for follow up if your stye doesn't go away after using warm compresses for 1 to 2 weeks, your stye gets very big, bleeds, or affects your vision, your whole eye is red, or your whole eyelid is red or swollen, the redness or swelling spreads to your cheek or other parts of your face.     HPI:  Shan Rashid is a 49 year old male who presents today with right upper eye lid redness, tenderness, swelling over the past 2-3 days. No fevers or chills. No visual disturbances. No obvious discharge from eye. No eye crusting. Otherwise is feeling well.     History obtained from the patient.    Problem List:  Human immunodeficiency virus (HIV) disease (H)  Perirectal abscess  Depression      Past Medical History:   Diagnosis Date    Depression     Human immunodeficiency virus (HIV) disease (H) Diagnosed July, 2009.    Started on Atripla 8/13/09.      Perirectal abscess October, 2010.    Responded to therapy with bactrim.       Social History     Tobacco Use    Smoking status: Former     Current packs/day: 0.00     Types: Cigarettes     Start date: 9/22/1997     Quit date: 9/22/2000     Years since  "quittin.7    Smokeless tobacco: Never   Substance Use Topics    Alcohol use: Yes     Comment: 2 drinks week, usually beer       Review of Systems   Eyes:         Right upper eye lid swelling, redness, tenderness.   All other systems reviewed and are negative.      Vitals:    24 1641   BP: 108/80   Pulse: 76   Resp: 15   Temp: 97.8  F (36.6  C)   TempSrc: Temporal   SpO2: 98%   Weight: 73.5 kg (162 lb)   Height: 1.753 m (5' 9\")       Physical Exam  Constitutional:       General: He is not in acute distress.     Appearance: Normal appearance. He is normal weight. He is not ill-appearing, toxic-appearing or diaphoretic.   HENT:      Head: Normocephalic and atraumatic.      Right Ear: Tympanic membrane, ear canal and external ear normal. There is no impacted cerumen.      Left Ear: Tympanic membrane, ear canal and external ear normal. There is no impacted cerumen.      Nose: Nose normal.      Mouth/Throat:      Mouth: Mucous membranes are moist.      Pharynx: No oropharyngeal exudate or posterior oropharyngeal erythema.   Eyes:      General: Lids are everted, no foreign bodies appreciated. Vision grossly intact. Gaze aligned appropriately. No allergic shiner, visual field deficit or scleral icterus.        Right eye: Hordeolum present. No foreign body or discharge.         Left eye: No foreign body, discharge or hordeolum.      Extraocular Movements:      Right eye: Normal extraocular motion and no nystagmus.      Left eye: Normal extraocular motion and no nystagmus.      Conjunctiva/sclera: Conjunctivae normal.      Right eye: Right conjunctiva is not injected. No chemosis, exudate or hemorrhage.     Left eye: Left conjunctiva is not injected. No chemosis, exudate or hemorrhage.     Pupils: Pupils are equal, round, and reactive to light.      Comments: Appears to be a hordeolum, but has not yet come to surface of right upper eye lid. Upper eye lid also with surrounding erythema, swelling, tenderness upon " palpation.    Cardiovascular:      Rate and Rhythm: Normal rate and regular rhythm.      Heart sounds: Normal heart sounds. No murmur heard.  Pulmonary:      Effort: Pulmonary effort is normal. No respiratory distress.      Breath sounds: Normal breath sounds.   Abdominal:      General: Abdomen is flat.   Musculoskeletal:      Cervical back: Neck supple. No rigidity or tenderness.   Lymphadenopathy:      Cervical: No cervical adenopathy.   Skin:     General: Skin is warm.      Capillary Refill: Capillary refill takes less than 2 seconds.      Findings: Erythema present.   Neurological:      General: No focal deficit present.      Mental Status: He is alert and oriented to person, place, and time.   Psychiatric:         Mood and Affect: Mood normal.         Behavior: Behavior normal.         Thought Content: Thought content normal.         Judgment: Judgment normal.

## 2024-06-05 NOTE — PATIENT INSTRUCTIONS
"I am concerned about the redness and swelling of your right upper eye lid - for this, please take antibiotic - Augmentin, twice daily for a total of 5 days.     I also suspect you have a stye.     A stye is a red and painful lump on the eyelid. It happens when a small gland on the edge of the eyelid gets infected or inflamed. Styes can form on the upper or lower eyelids. Most styes get better on their own after a few days to a week. The medical term for stye is \"hordeolum.\"    People sometimes get a stye confused with a different eye problem called a \"chalazion.\" A chalazion also causes a lump on the eyelid. But a stye is caused by an infection and is painful. A chalazion is not tender or painful, but it often lasts longer than a stye does.    A stye usually develops over a few days. Styes can cause other symptoms, too, such as tearing and eyelid pain and swelling.     Put a warm, wet compress on the stye. Wet a clean washcloth with warm water, and put it over your stye. When the washcloth cools, reheat it with warm water and put it back over the stye. Repeat these steps for about 15 minutes, and try to do this 4 times a day.    ?It might help to gently massage your eyelid.  ?Do not squeeze or try to pop your stye. This can make it worse.  ?Do not wear eye makeup or contact lenses until your stye is gone.    To lower your chances of getting a stye, you can:  ?Wash your hands often - It's especially important to wash your hands before you touch your eyes. Also, if you wear contact lenses, keep them clean and wash your hands before you put them in.  ?Be careful with your eye makeup - Wearing eye makeup can sometimes cause a stye. Remove your eye makeup each night, and throw away old makeup. Do not share eye makeup with other people.    Please return for follow up if your stye doesn't go away after using warm compresses for 1 to 2 weeks, your stye gets very big, bleeds, or affects your vision, your whole eye is red, or " your whole eyelid is red or swollen, the redness or swelling spreads to your cheek or other parts of your face.

## 2024-06-06 LAB
C TRACH DNA SPEC QL NAA+PROBE: NEGATIVE
HIV1 RNA # PLAS NAA DL=20: NOT DETECTED COPIES/ML
N GONORRHOEA DNA SPEC QL NAA+PROBE: NEGATIVE

## 2024-06-13 ENCOUNTER — OFFICE VISIT (OUTPATIENT)
Dept: INFECTIOUS DISEASES | Facility: CLINIC | Age: 49
End: 2024-06-13
Attending: INTERNAL MEDICINE
Payer: COMMERCIAL

## 2024-06-13 VITALS
DIASTOLIC BLOOD PRESSURE: 84 MMHG | OXYGEN SATURATION: 96 % | SYSTOLIC BLOOD PRESSURE: 136 MMHG | HEART RATE: 87 BPM | BODY MASS INDEX: 25.17 KG/M2 | WEIGHT: 169.9 LBS | TEMPERATURE: 98.2 F | HEIGHT: 69 IN

## 2024-06-13 DIAGNOSIS — B20 HUMAN IMMUNODEFICIENCY VIRUS (HIV) DISEASE (H): Primary | ICD-10-CM

## 2024-06-13 DIAGNOSIS — Z00.00 HEALTH CARE MAINTENANCE: ICD-10-CM

## 2024-06-13 DIAGNOSIS — R21 RASH AND NONSPECIFIC SKIN ERUPTION: ICD-10-CM

## 2024-06-13 DIAGNOSIS — Z00.00 HEALTHCARE MAINTENANCE: ICD-10-CM

## 2024-06-13 PROCEDURE — 99213 OFFICE O/P EST LOW 20 MIN: CPT | Performed by: INTERNAL MEDICINE

## 2024-06-13 PROCEDURE — 99214 OFFICE O/P EST MOD 30 MIN: CPT | Mod: GC | Performed by: INTERNAL MEDICINE

## 2024-06-13 PROCEDURE — 90480 ADMN SARSCOV2 VAC 1/ONLY CMP: CPT | Performed by: INTERNAL MEDICINE

## 2024-06-13 PROCEDURE — 91320 SARSCV2 VAC 30MCG TRS-SUC IM: CPT | Performed by: INTERNAL MEDICINE

## 2024-06-13 PROCEDURE — 250N000011 HC RX IP 250 OP 636: Performed by: INTERNAL MEDICINE

## 2024-06-13 RX ADMIN — COVID-19 VACCINE, MRNA 30 MCG: 0.05 INJECTION, SUSPENSION INTRAMUSCULAR at 16:11

## 2024-06-13 ASSESSMENT — PAIN SCALES - GENERAL: PAINLEVEL: NO PAIN (0)

## 2024-06-13 NOTE — PROGRESS NOTES
Shan Rashid is here today for a complaint of asymptomatic HIV      Assessment & Plan/Recommendations     Shan is a 49 year old male with PMH including HIV (diagnosed in 07/2009 with CD4 stanislav 234 (11%) at that time, started on Atripla 08/2009, then switched to Genvoya 4/6/2017, and now Biktarvy since 4/2023; well controlled on ART; previously followed with Dr. Pemberton), GERD, history of skin abscesses and prior perirectal abscess (s/p Bactrim course in 2010), history of depression (now improved), who presents to ID clinic for follow up of HIV care.        1. HIV, asymptomatic  - well controlled on antiretroviral therapy - on Biktarvy (FTC/TAF/BIC) without issues - will refill today  - already had routine HIV maintenance labs drawn before this visit which we reviewed together  - will order routine screening labs for next visit (but not interested in throat/rectal swabs as he is monogamous so low risk); will obtain CD4 count with next labs  - follow up in approx 4-6 months for repeat labs     2. Facial rash  Has rash on mid-forehead and bilateral cheeks, appears consistent with rosacea. Does not spare nasolabial folds. Seb derm also possible though not flaky or greasy.   - derm referral placed    3.  Health maintenance  - COVID booster in clinic today  - suggested he consider establishing care with PCP in future for remaining health maintenance management           Colonoscopy: never  Anal pap: last 6/2019  GC/Chlamydia: CT urine positive 12/2/11; last negative CT/GC screen 6/5/2024  Syphilis: last 8/3/23 negative   Quantiferon: last negative 8/3/23  G6PD: unknown  HLA-: unknown  Toxo: 7/7/09 negative  Crypto (if CD4<50): unknown  CMV: 7/16/09 IgG positive   Hep A : 4/7/16 reactive  Hep B: 7/7/09 HBsAb reactive  Hep C: 8/3/23 negative  Hep A vaccine: 5/6/10  Tdap vaccine: 6/2/2021  Flu vaccine: last 9/28/22  Prevnar vaccine: 07/2014, Prevar-20 (8/2023)  Pneumovax-23 vaccine: given in 2009, booster  2015  Meningococcal MenACWY (Menveo, Menactra) vaccine: 8/13/15, 11/20/15, 3/2024  HPV vaccine: declined previously  MMR vaccine: immune on titers 2/2024  COVID: 3/26/21, 4/16/21, 11/9/21, 9/21/22, 6/13/2204  Shingrix: 4/6/22, 4/28/23  Jynneos: 8/3/22, 9/28/22      I communicated with the patient and at this visit.      Patient was seen on 06/13/2024                Patient seen and staffed with Dr. Porter.     Jose Sheldon MD  IM PGY3    Bharathi Porter MD  Infectious Diseases      Subjective       HPI:  Shan Rashid is a 49 year old male with PMH including asymptomatic HIV.     HIV past medical history:  Diagnosed: 07/2009  Risk factors: MSM   Stanislav CD4:  234 (11%) in 07/2009  Genotypes: 7/16/09 HIV genotype/phenotype: no clinically relevant resistance mutations reported  Treatment history:  Started on Atripla 08/2009.  CD4 stanislav 234 (11%) in 07/2009. Switched to Genvoya 4/6/2017; changed to Biktarvy 4/26/23  Prior OIs:  None known  Supports:     Interval HPI:  Last seen in HIV clinic 3/13/2024, and at that time, he was to continue Biktarvy. Most recent labs 6/5/2024 include HIV viral load not detected, treponema negative, GC/chlamydia (urine) negative, unremarkable CBC/CMP.     Today he reports feeling well. He does mention that he was on a course of antibiotics for an infected stye on his right upper eyelid that improved.     Regarding Biktarvy, he rarely misses a dose (misses 0-1 dose per month).     He does have a rash on his face for about a month that does not itch, not greasy, on forehead and cheeks. No significant sun exposure.     ROS: no vision changes, headaches, pain with eye movements, chest pain, difficulty breathing, abdominal pain, nausea, vomiting, diarrhea, hematochezia, melena, dysuria.     Social hx:   - tobacco- no  - alcohol- no  - illicit drugs- no  - sexual- no new sexual partners, monogamous with , no STI concerns      PAST MEDICAL HISTORY  Past Medical History:  "  Diagnosis Date    Depression     Human immunodeficiency virus (HIV) disease (H) Diagnosed .    Started on Atripla 09.      Perirectal abscess .    Responded to therapy with bactrim.     Otherwise as per HPI    PAST SURGICAL HISTORY  No past surgical history on file.  Otherwise as per HPI    ALLERGIES AND DRUG REACTIONS  Allergies   Allergen Reactions    Nkda [No Known Drug Allergy]        FAMILY HISTORY  No known immunocompromising conditions or infections unless listed below  family history is not on file.    SOCIAL AND FUNCTIONAL HISTORY  Social History     Tobacco Use    Smoking status: Former     Current packs/day: 0.00     Types: Cigarettes     Start date: 1997     Quit date: 2000     Years since quittin.7    Smokeless tobacco: Never   Substance Use Topics    Alcohol use: Yes     Comment: 2 drinks week, usually beer    Drug use: No     Otherwise as per HPI    CURRENT ANTIMICROBIALS  Other medications reviewed in [facility]      REVIEW OF SYSTEMS  ROS obtained, pertinent positives and negatives as above.      Objective   PHYSICAL EXAMINATION     height is 1.753 m (5' 9\") and weight is 77.1 kg (169 lb 14.4 oz). His oral temperature is 98.2  F (36.8  C). His blood pressure is 136/84 and his pulse is 87. His oxygen saturation is 96%.     Constitutional:  appearance not in distress, appears comfortable, cooperative  Eyes: no conjunctival injection, no scleral icterus  ENT: no nasal discharge, membranes moist, oropharynx pink and without exudates or thrush  Cardiovascular: regular rate and rhythm  Pulmonary: unlabored breathing, clear to ascultation bilaterally  Gastrointestinal: abdomen non-distended, soft, non-tender, normoactive bowel sounds  Musculoskeletal: normal bulk and tone  Lymphatic: no significant LAD   Extremities: warm and well perfused, no cyanosis or edema  Skin: mid forehead and bilateral cheeks with mild pink erythematous rash not sparing nasolabial folds " without scale or greasy appearance.  Neurologic: awake, alert and responsive, moving all extremities, no facial asymmetry, no nuchal rigidity  Psychiatric: normal judgment/insight, normal memory, normal mood/affect      Other Significant Information (Labs, cultures, radiology, etc)    No lab results found.    Recent micro reviewed: Most recent labs 6/5/2024 include HIV viral load not detected, treponema negative, GC/chlamydia (urine) negative, unremarkable CBC/CMP.       Recent radiology reviewed:  n/a

## 2024-06-13 NOTE — NURSING NOTE
"Chief Complaint   Patient presents with    Follow Up     /84   Pulse 87   Temp 98.2  F (36.8  C) (Oral)   Ht 1.753 m (5' 9\")   Wt 77.1 kg (169 lb 14.4 oz)   SpO2 96%   BMI 25.09 kg/m    Akhil De Los Santos MA on 6/13/2024 at 3:34 PM    "

## 2024-06-13 NOTE — Clinical Note
6/13/2024       RE: Shan Rashid  [address]  [place] [address]     Dear Colleague,    Thank you for referring your patient, Shan Rashid, to the [facility] [place] at [facility]. Please see a copy of my visit note below.    Shan Rashid is here today for a complaint of asymptomatic HIV      Assessment & Plan/Recommendations     Shan is a 49 year old male with PMH including HIV (diagnosed in 07/2009 with CD4 stanislav 234 (11%) at that time, started on Atripla 08/2009, then switched to Genvoya 4/6/2017, and now Biktarvy since 4/2023; well controlled on ART; previously followed with Dr. Pemberton), GERD, history of skin abscesses and prior perirectal abscess (s/p Bactrim course in 2010), history of depression (now improved), who presents to ID clinic for follow up of HIV care.        1. HIV, asymptomatic  - well controlled on antiretroviral therapy - on Biktarvy (FTC/TAF/BIC) without issues - will refill today  - already had routine HIV maintenance labs drawn before this visit which we reviewed together  - will order routine screening labs for next visit (but not interested in throat/rectal swabs as he is monogamous so low risk); will obtain CD4 count with next labs  - follow up in approx 4-6 months for repeat labs     2. Facial rash  Has rash on mid-forehead and bilateral cheeks, appears consistent with rosacea. Does not spare nasolabial folds. Seb derm also possible though not flaky or greasy.   - derm referral placed    3.  Health maintenance  - COVID booster in clinic today  - suggested he consider establishing care with PCP in future for remaining health maintenance management           Colonoscopy: never  Anal pap: last 6/2019  GC/Chlamydia: CT urine positive 12/2/11; last negative CT/GC screen 6/5/2024  Syphilis: last 8/3/23 negative   Quantiferon: last negative 8/3/23  G6PD: unknown  HLA-:  unknown  Toxo: 7/7/09 negative  Crypto (if CD4<50): unknown  CMV: 7/16/09 IgG positive   Hep A : 4/7/16 reactive  Hep B: 7/7/09 HBsAb reactive  Hep C: 8/3/23 negative  Hep A vaccine: 5/6/10  Tdap vaccine: 6/2/2021  Flu vaccine: last 9/28/22  Prevnar vaccine: 07/2014, Prevar-20 (8/2023)  Pneumovax-23 vaccine: given in 2009, booster 2015  Meningococcal MenACWY (Menveo, Menactra) vaccine: 8/13/15, 11/20/15, 3/2024  HPV vaccine: declined previously  MMR vaccine: immune on titers 2/2024  COVID: 3/26/21, 4/16/21, 11/9/21, 9/21/22, 6/13/2204  Shingrix: 4/6/22, 4/28/23  Jynneos: 8/3/22, 9/28/22      I communicated with the patient and at this visit.      Patient was seen on 06/13/2024                Patient seen and staffed with Dr. Porter.     Jose Sheldon MD  IM PGY3    Bharathi Porter MD  Infectious Diseases      Subjective       HPI:  Shan Rashid is a 49 year old male with PMH including asymptomatic HIV.     HIV past medical history:  Diagnosed: 07/2009  Risk factors: MSM   Stanislav CD4:  234 (11%) in 07/2009  Genotypes: 7/16/09 HIV genotype/phenotype: no clinically relevant resistance mutations reported  Treatment history:  Started on Atripla 08/2009.  CD4 stanislav 234 (11%) in 07/2009. Switched to Genvoya 4/6/2017; changed to Biktarvy 4/26/23  Prior OIs:  None known  Supports:     Interval HPI:  Last seen in HIV clinic 3/13/2024, and at that time, he was to continue Biktarvy. Most recent labs 6/5/2024 include HIV viral load not detected, treponema negative, GC/chlamydia (urine) negative, unremarkable CBC/CMP.     Today he reports feeling well. He does mention that he was on a course of antibiotics for an infected stye on his right upper eyelid that improved.     Regarding Biktarvy, he rarely misses a dose (misses 0-1 dose per month).     He does have a rash on his face for about a month that does not itch, not greasy, on forehead and cheeks. No significant sun exposure.     ROS: no vision changes, headaches,  "pain with eye movements, chest pain, difficulty breathing, abdominal pain, nausea, vomiting, diarrhea, hematochezia, melena, dysuria.     Social hx:   - tobacco- no  - alcohol- no  - illicit drugs- no  - sexual- no new sexual partners, monogamous with , no STI concerns      PAST MEDICAL HISTORY  Past Medical History:   Diagnosis Date    Depression     Human immunodeficiency virus (HIV) disease (H) Diagnosed .    Started on Atripla 09.      Perirectal abscess .    Responded to therapy with bactrim.     Otherwise as per HPI    PAST SURGICAL HISTORY  No past surgical history on file.  Otherwise as per HPI    ALLERGIES AND DRUG REACTIONS  Allergies   Allergen Reactions    Nkda [No Known Drug Allergy]        FAMILY HISTORY  No known immunocompromising conditions or infections unless listed below  family history is not on file.    SOCIAL AND FUNCTIONAL HISTORY  Social History     Tobacco Use    Smoking status: Former     Current packs/day: 0.00     Types: Cigarettes     Start date: 1997     Quit date: 2000     Years since quittin.7    Smokeless tobacco: Never   Substance Use Topics    Alcohol use: Yes     Comment: 2 drinks week, usually beer    Drug use: No     Otherwise as per HPI    CURRENT ANTIMICROBIALS  Other medications reviewed in [facility]      REVIEW OF SYSTEMS  ROS obtained, pertinent positives and negatives as above.      Objective   PHYSICAL EXAMINATION     height is 1.753 m (5' 9\") and weight is 77.1 kg (169 lb 14.4 oz). His oral temperature is 98.2  F (36.8  C). His blood pressure is 136/84 and his pulse is 87. His oxygen saturation is 96%.     Constitutional:  appearance not in distress, appears comfortable, cooperative  Eyes: no conjunctival injection, no scleral icterus  ENT: no nasal discharge, membranes moist, oropharynx pink and without exudates or thrush  Cardiovascular: regular rate and rhythm  Pulmonary: unlabored breathing, clear to ascultation " bilaterally  Gastrointestinal: abdomen non-distended, soft, non-tender, normoactive bowel sounds  Musculoskeletal: normal bulk and tone  Lymphatic: no significant LAD   Extremities: warm and well perfused, no cyanosis or edema  Skin: mid forehead and bilateral cheeks with mild pink erythematous rash not sparing nasolabial folds without scale or greasy appearance.  Neurologic: awake, alert and responsive, moving all extremities, no facial asymmetry, no nuchal rigidity  Psychiatric: normal judgment/insight, normal memory, normal mood/affect      Other Significant Information (Labs, cultures, radiology, etc)    No lab results found.    Recent micro reviewed: Most recent labs 6/5/2024 include HIV viral load not detected, treponema negative, GC/chlamydia (urine) negative, unremarkable CBC/CMP.       Recent radiology reviewed:  n/a       Attestation signed by Bharathi Porter MD at 6/15/2024  8:34 AM:  Attestation:  I saw and evaluated this patient, discussed the patient with the resident, and agree with the assessment and plan of care as documented in the note. I personally reviewed vital signs, medications, labs, and imaging. 35 minutes spent by me on the date of the encounter doing chart review, history and exam, documentation and further activities per the note    Bharathi Porter MD  Date of Service: 06/13/2024        Again, thank you for allowing me to participate in the care of your patient.      Sincerely,    Bharathi Porter MD

## 2024-06-25 ENCOUNTER — TELEPHONE (OUTPATIENT)
Dept: INFECTIOUS DISEASES | Facility: CLINIC | Age: 49
End: 2024-06-25
Payer: COMMERCIAL

## 2024-06-25 NOTE — TELEPHONE ENCOUNTER
Left Voicemail (1st Attempt) and Sent Mychart (1st Attempt) for the patient to call back and schedule the following:    Appointment type: Return  Provider: German  Return date: 10/13/2024  Specialty phone number: [phone]  Additional appointment(s) needed: labs  Additonal Notes: na

## 2024-06-30 ENCOUNTER — HEALTH MAINTENANCE LETTER (OUTPATIENT)
Age: 49
End: 2024-06-30

## 2024-10-10 ENCOUNTER — LAB (OUTPATIENT)
Dept: LAB | Facility: CLINIC | Age: 49
End: 2024-10-10
Payer: COMMERCIAL

## 2024-10-10 DIAGNOSIS — Z00.00 HEALTH CARE MAINTENANCE: ICD-10-CM

## 2024-10-10 DIAGNOSIS — B20 HUMAN IMMUNODEFICIENCY VIRUS (HIV) DISEASE (H): ICD-10-CM

## 2024-10-10 LAB
ALBUMIN SERPL BCG-MCNC: 4.6 G/DL (ref 3.5–5.2)
ALP SERPL-CCNC: 72 U/L (ref 40–150)
ALT SERPL W P-5'-P-CCNC: 29 U/L (ref 0–70)
ANION GAP SERPL CALCULATED.3IONS-SCNC: 10 MMOL/L (ref 7–15)
AST SERPL W P-5'-P-CCNC: 23 U/L (ref 0–45)
BILIRUB SERPL-MCNC: 0.4 MG/DL
BUN SERPL-MCNC: 11.4 MG/DL (ref 6–20)
CALCIUM SERPL-MCNC: 9.5 MG/DL (ref 8.8–10.4)
CHLORIDE SERPL-SCNC: 101 MMOL/L (ref 98–107)
CREAT SERPL-MCNC: 1.1 MG/DL (ref 0.67–1.17)
EGFRCR SERPLBLD CKD-EPI 2021: 83 ML/MIN/1.73M2
ERYTHROCYTE [DISTWIDTH] IN BLOOD BY AUTOMATED COUNT: 13.1 % (ref 10–15)
GLUCOSE SERPL-MCNC: 95 MG/DL (ref 70–99)
HCO3 SERPL-SCNC: 28 MMOL/L (ref 22–29)
HCT VFR BLD AUTO: 41.4 % (ref 40–53)
HGB BLD-MCNC: 14.4 G/DL (ref 13.3–17.7)
MCH RBC QN AUTO: 28.2 PG (ref 26.5–33)
MCHC RBC AUTO-ENTMCNC: 34.8 G/DL (ref 31.5–36.5)
MCV RBC AUTO: 81 FL (ref 78–100)
PLATELET # BLD AUTO: 243 10E3/UL (ref 150–450)
POTASSIUM SERPL-SCNC: 3.9 MMOL/L (ref 3.4–5.3)
PROT SERPL-MCNC: 7.4 G/DL (ref 6.4–8.3)
RBC # BLD AUTO: 5.11 10E6/UL (ref 4.4–5.9)
SODIUM SERPL-SCNC: 139 MMOL/L (ref 135–145)
T PALLIDUM AB SER QL: NONREACTIVE
WBC # BLD AUTO: 7.3 10E3/UL (ref 4–11)

## 2024-10-10 PROCEDURE — 36415 COLL VENOUS BLD VENIPUNCTURE: CPT | Performed by: PATHOLOGY

## 2024-10-10 PROCEDURE — 80053 COMPREHEN METABOLIC PANEL: CPT | Performed by: PATHOLOGY

## 2024-10-10 PROCEDURE — 87536 HIV-1 QUANT&REVRSE TRNSCRPJ: CPT | Performed by: INTERNAL MEDICINE

## 2024-10-10 PROCEDURE — 87491 CHLMYD TRACH DNA AMP PROBE: CPT | Performed by: INTERNAL MEDICINE

## 2024-10-10 PROCEDURE — 85027 COMPLETE CBC AUTOMATED: CPT | Performed by: PATHOLOGY

## 2024-10-10 PROCEDURE — 86359 T CELLS TOTAL COUNT: CPT | Performed by: INTERNAL MEDICINE

## 2024-10-10 PROCEDURE — 86780 TREPONEMA PALLIDUM: CPT | Performed by: INTERNAL MEDICINE

## 2024-10-10 PROCEDURE — 87591 N.GONORRHOEAE DNA AMP PROB: CPT | Performed by: INTERNAL MEDICINE

## 2024-10-10 PROCEDURE — 99000 SPECIMEN HANDLING OFFICE-LAB: CPT | Performed by: PATHOLOGY

## 2024-10-11 LAB
C TRACH DNA SPEC QL NAA+PROBE: NEGATIVE
CD3 CELLS # BLD: 1927 CELLS/UL (ref 603–2990)
CD3 CELLS NFR BLD: 81 % (ref 49–84)
CD3+CD4+ CELLS # BLD: 1052 CELLS/UL (ref 441–2156)
CD3+CD4+ CELLS NFR BLD: 44 % (ref 28–63)
CD3+CD4+ CELLS/CD3+CD8+ CLL BLD: 1.39 % (ref 1.4–2.6)
CD3+CD8+ CELLS # BLD: 754 CELLS/UL (ref 125–1312)
CD3+CD8+ CELLS NFR BLD: 32 % (ref 10–40)
N GONORRHOEA DNA SPEC QL NAA+PROBE: NEGATIVE
T CELL COMMENT: ABNORMAL

## 2024-10-12 LAB
HIV1 RNA # PLAS NAA DL=20: 38 COPIES/ML
HIV1 RNA SERPL NAA+PROBE-LOG#: 1.6 {LOG_COPIES}/ML

## 2024-10-24 ENCOUNTER — OFFICE VISIT (OUTPATIENT)
Dept: INFECTIOUS DISEASES | Facility: CLINIC | Age: 49
End: 2024-10-24
Attending: INTERNAL MEDICINE
Payer: COMMERCIAL

## 2024-10-24 VITALS
HEART RATE: 79 BPM | DIASTOLIC BLOOD PRESSURE: 87 MMHG | OXYGEN SATURATION: 98 % | TEMPERATURE: 97.4 F | WEIGHT: 166.1 LBS | BODY MASS INDEX: 24.6 KG/M2 | SYSTOLIC BLOOD PRESSURE: 123 MMHG | HEIGHT: 69 IN

## 2024-10-24 DIAGNOSIS — B20 HUMAN IMMUNODEFICIENCY VIRUS (HIV) DISEASE (H): Primary | ICD-10-CM

## 2024-10-24 DIAGNOSIS — Z11.59 SCREENING FOR VIRAL DISEASE: ICD-10-CM

## 2024-10-24 DIAGNOSIS — Z00.00 HEALTH CARE MAINTENANCE: ICD-10-CM

## 2024-10-24 PROCEDURE — 250N000011 HC RX IP 250 OP 636: Performed by: INTERNAL MEDICINE

## 2024-10-24 PROCEDURE — G0008 ADMIN INFLUENZA VIRUS VAC: HCPCS | Performed by: INTERNAL MEDICINE

## 2024-10-24 PROCEDURE — 99214 OFFICE O/P EST MOD 30 MIN: CPT | Performed by: INTERNAL MEDICINE

## 2024-10-24 PROCEDURE — 99213 OFFICE O/P EST LOW 20 MIN: CPT | Performed by: INTERNAL MEDICINE

## 2024-10-24 PROCEDURE — 90656 IIV3 VACC NO PRSV 0.5 ML IM: CPT | Performed by: INTERNAL MEDICINE

## 2024-10-24 RX ADMIN — INFLUENZA A VIRUS A/VICTORIA/4897/2022 IVR-238 (H1N1) ANTIGEN (FORMALDEHYDE INACTIVATED), INFLUENZA A VIRUS A/CALIFORNIA/122/2022 SAN-022 (H3N2) ANTIGEN (FORMALDEHYDE INACTIVATED), AND INFLUENZA B VIRUS B/MICHIGAN/01/2021 ANTIGEN (FORMALDEHYDE INACTIVATED) 0.5 ML: 15; 15; 15 INJECTION, SUSPENSION INTRAMUSCULAR at 15:57

## 2024-10-24 ASSESSMENT — PAIN SCALES - GENERAL: PAINLEVEL_OUTOF10: NO PAIN (0)

## 2024-10-24 NOTE — NURSING NOTE
"Chief Complaint   Patient presents with    RECHECK     Follow Up     /87   Pulse 79   Temp 97.4  F (36.3  C) (Oral)   Ht 1.753 m (5' 9\")   Wt 75.3 kg (166 lb 1.6 oz)   SpO2 98%   BMI 24.53 kg/m    Marian Tavares on 10/24/2024 at 3:25 PM    "

## 2024-10-24 NOTE — PROGRESS NOTES
Shan Rashid is here today for HIV care follow up.    Assessment & Plan/Recommendations     Shan is a 49 year old male with PMH including HIV (diagnosed in 07/2009 with CD4 stanislav 234 (11%) at that time, started on Atripla 08/2009, then switched to Genvoya 4/6/2017, and now Biktarvy since 4/2023; well controlled on ART; previously followed with Dr. Pemberton), GERD, history of skin abscesses and prior perirectal abscess (s/p Bactrim course in 2010), history of depression (now improved), who presents to ID clinic for follow up of HIV care.      1. HIV, asymptomatic  - well controlled on antiretroviral therapy - on Biktarvy (FTC/TAF/BIC) without issues - will refill today  - already had routine HIV maintenance labs drawn before this visit which we reviewed together  - will order routine screening labs for next visit (including routine annual screenings - fasting lipids, etc)  - follow up in approx 4-6 months for repeat labs and will discuss further vaccine boosters at that time      2.  Health maintenance  - patient amenable to getting influenza vaccine booster at today's visit  - patient reports seeing therapist  - previously suggested he consider establishing care with PCP in future for remaining health maintenance management and other routine screenings         Colonoscopy: never  Anal pap: last 6/2019  GC/Chlamydia: CT urine positive 12/2/11; last 12/22/17 negative CT/GC screen  Syphilis: last 10/2024 negative   Quantiferon: last negative 8/3/23  G6PD: unknown  HLA-: unknown  Toxo: 7/7/09 negative  Crypto (if CD4<50): unknown  CMV: 7/16/09 IgG positive   Hep A : 4/7/16 reactive  Hep B: 7/7/09 HBsAb reactive  Hep C: 8/3/23 negative  Hep A vaccine: 5/6/10, (reports that he had the second dose as well - not documented)  Tdap vaccine: 6/2/2021  Flu vaccine: 10/24/24  Prevnar vaccine: 07/2014, Prevar-20 (8/2023)  Pneumovax-23 vaccine: given in 2009, booster 2015  Meningococcal MenACWY (Menveo, Menactra)  vaccine: 8/13/15, 11/20/15, 3/2024  HPV vaccine: declined previously  MMR vaccine: immune on titers 2/2024  COVID: 3/26/21, 4/16/21, 11/9/21, 9/21/22, 6/13/24  Shingrix: 4/6/22, 4/28/23  Jynneos: 8/3/22, 9/28/22      I communicated with the patient at this visit.  Patient was seen on 10/24/2024 in ID clinic.    35 minutes spent on the date of the encounter doing chart review, history and exam, documentation and further activities per the note    Bharathi Porter MD  Infectious Diseases      Subjective       HPI Interval events/updates:  10/24/2024 update: Taking Biktarvy since last visit, only missed ~2 doses since then. No multivitamins. Takes in AMs. No new symptoms since last visit. In middle of divorce with his ex- - just moved out this week while ex- is out of the country. No new sexual activity. Already seeing a therapist who is helpful. Declines talking to SW at today's visit.     HIV past medical history:  Diagnosed: 07/2009  Risk factors: MSM   Arjun CD4:  234 (11%) in 07/2009  Genotypes: 7/16/09 HIV genotype/phenotype: no clinically relevant resistance mutations reported  Treatment history:  Started on Atripla 08/2009.  CD4 arjun 234 (11%) in 07/2009. Switched to Genvoya 4/6/2017; changed to Biktarvy 4/26/23  Prior OIs:  None known  Supports:      ART Adherence:  Current regimen: Biktarvy 1 tab daily  Missed doses in last week, month:  ~1x/month  Estimates adherence at:  99%  Medication side effects:  Never with Genvoya or Atripla  OTC medications: multivitamin    Social history:  Background: Originally from [place] ([place]), now lives in [place]  Lives in/with:  alone  Employment:  Works for [employer] at [employer]  Education:   Interests:   International travel:  last visited 's parents in [place] 2019  Pets: none  Alcohol: occasional weekends  Tobacco:  Never regular smoker  Other substances:  never  Sexual Hx:  Relationship: recently   History of  "STI diagnosis and treatment:  remote history of chlamydia ()      PAST MEDICAL HISTORY  Past Medical History:   Diagnosis Date    Depression     Human immunodeficiency virus (HIV) disease (H) Diagnosed .    Started on Atripla 09.      Perirectal abscess .    Responded to therapy with bactrim.   Otherwise as per HPI    PAST SURGICAL HISTORY  Denies history of surgeries  Otherwise as per HPI    ALLERGIES AND DRUG REACTIONS  Allergies   Allergen Reactions    Nkda [No Known Drug Allergy]        FAMILY HISTORY  No known immunocompromising conditions or infections unless listed below  Mother with diabetes    SOCIAL AND FUNCTIONAL HISTORY  Social History     Tobacco Use    Smoking status: Former     Current packs/day: 0.00     Types: Cigarettes     Start date: 1997     Quit date: 2000     Years since quittin.1    Smokeless tobacco: Never   Substance Use Topics    Alcohol use: Yes     Comment: 2 drinks week, usually beer    Drug use: No     Socioeconomic History    Marital status:    As per HPI    CURRENT ANTIBIOTICS  Other medications reviewed in EPIC  Biktarvy 1 tab daily      REVIEW OF SYSTEMS  ROS as above.      Objective   PHYSICAL EXAMINATION     height is 1.753 m (5' 9\") and weight is 75.3 kg (166 lb 1.6 oz). His oral temperature is 97.4  F (36.3  C). His blood pressure is 123/87 and his pulse is 79. His oxygen saturation is 98%.     Constitutional:  appearance not in acute distress  ENT: no conjunctival injection, no scleral icterus; no thrush  Neck: no lymphadenopathy  Pulmonary: unlabored breathing, clear to ascultation bilaterally  Gastrointestinal: abdomen non-distended   Musculoskeletal: normal bulk and tone  Skin: no rashes visible on exposed skin  Neurologic: awake, alert and interactive      Other Significant Information (Labs, cultures, radiology, etc)    Recent micro:   09 HIV VL: 462,100  09 CD4 count: 234 (11%)  09 HBsAb: positive  09 " HCV ab: negative  7/7/09 HAV ab: negative  7/7/09 CMV IgG ab: positive  7/7/09 toxo IgG ab: negative  7/7/09 syphilis ab: negative  7/16/09 quantiferon: negative  7/16/09 HIV genotype/phenotype: no clinically relevant resistance mutations reported  12/2/11 urine chlamydia: positive  4/7/16 HAV IgG ab: reactive  11/2/17 GC throat PCR: negative  12/22/17 urine chlamydia/gonorrhea: negtive  6/6/19 anal pap: negative cytology  6/4/20 Syphilis ab: negative  6/4/20 HIV VL: undetectable  6/4/20 CD4 count: 808 (39%)   5/26/21 Syphilis ab: negative  5/26/21 HIV VL: undetecable  5/26/21 CD4 count: 1035 (41%)  3/28/22 syphilis screen: negative  3/28/22 HIV VL: undetectable  3/28/22 CD4 count 1219 (44%)  4/10/23 HIV viral load: undetectable  4/10/23 CD4 count 1056 (43%)  8/3/23 quantiferon: negative  8/3/23 HCV screen: negative  8/3/23 syphilis screen: negative  8/3/23 HIV VL: 52  2/28/24 CD4 count 1004  2/28/24 HIV VL 58  2/28/24 urine gonorrhea/chlamydia: negative  6/5/24 syphilis screen: negative  6/5/24 HIV VL: undetectable  6/5/24 urine gonorrhea/chlamydia: negative  10/10/24 HIV VL: 38  10/10/24 CD4 count: 1052 (44%)  10/10/24 syphilis screen: negative  10/10/24 urine gonorrhea/chlamydia: negative

## 2024-10-24 NOTE — Clinical Note
10/24/2024       RE: Shan Rashid  [address]  [facility] [address]     Dear Colleague,    Thank you for referring your patient, Shan Rashid, to the [facility] [place] [facility]. Please see a copy of my visit note below.    Shan Rashid is here today for HIV care follow up.    Assessment & Plan/Recommendations     Shan is a 49 year old male with PMH including HIV (diagnosed in 07/2009 with CD4 stanislav 234 (11%) at that time, started on Atripla 08/2009, then switched to Genvoya 4/6/2017, and now Biktarvy since 4/2023; well controlled on ART; previously followed with Dr. Pemberton), GERD, history of skin abscesses and prior perirectal abscess (s/p Bactrim course in 2010), history of depression (now improved), who presents to ID clinic for follow up of HIV care.      1. HIV, asymptomatic  - well controlled on antiretroviral therapy - on Biktarvy (FTC/TAF/BIC) without issues - will refill today  - already had routine HIV maintenance labs drawn before this visit which we reviewed together  - will order routine screening labs for next visit (including routine annual screenings - fasting lipids, etc)  - follow up in approx 4-6 months for repeat labs and will discuss further vaccine boosters at that time      2.  Health maintenance  - patient amenable to getting influenza vaccine booster at today's visit  - previously suggested he consider establishing care with PCP in future for remaining health maintenance management and other routine screenings         Colonoscopy: never  Anal pap: last 6/2019  GC/Chlamydia: CT urine positive 12/2/11; last 12/22/17 negative CT/GC screen  Syphilis: last 10/2024 negative   Quantiferon: last negative 8/3/23  G6PD: unknown  HLA-: unknown  Toxo: 7/7/09 negative  Crypto (if CD4<50): unknown  CMV: 7/16/09 IgG positive   Hep A : 4/7/16 reactive  Hep B: 7/7/09 HBsAb reactive  Hep  C: 8/3/23 negative  Hep A vaccine: 5/6/10, (reports that he had the second dose as well - not documented)  Tdap vaccine: 6/2/2021  Flu vaccine: 10/24/24  Prevnar vaccine: 07/2014, Prevar-20 (8/2023)  Pneumovax-23 vaccine: given in 2009, booster 2015  Meningococcal MenACWY (Menveo, Menactra) vaccine: 8/13/15, 11/20/15, 3/2024  HPV vaccine: declined previously  MMR vaccine: immune on titers 2/2024  COVID: 3/26/21, 4/16/21, 11/9/21, 9/21/22, 6/13/24  Shingrix: 4/6/22, 4/28/23  Jynneos: 8/3/22, 9/28/22      I communicated with the patient at this visit.  Patient was seen on 10/24/2024 in ID clinic.    35 minutes spent on the date of the encounter doing chart review, history and exam, documentation and further activities per the note    Bharathi Porter MD  Infectious Diseases      Subjective       HPI Interval events/updates:  10/24/2024 update: Taking Biktarvy since last visit, only missed ~2 doses since then. No multivitamins. Takes in AMs. No new symptoms since last visit. In middle of divorce with his ex- - just moved out this week while ex- is out of the country. No new sexual activity. Already seeing a therapist who is helpful. Declines talking to SW at today's visit.     HIV past medical history:  Diagnosed: 07/2009  Risk factors: MSM   Arjun CD4:  234 (11%) in 07/2009  Genotypes: 7/16/09 HIV genotype/phenotype: no clinically relevant resistance mutations reported  Treatment history:  Started on Atripla 08/2009.  CD4 arjun 234 (11%) in 07/2009. Switched to Genvoya 4/6/2017; changed to Biktarvy 4/26/23  Prior OIs:  None known  Supports:      ART Adherence:  Current regimen: Biktarvy 1 tab daily  Missed doses in last week, month:  ~1x/month  Estimates adherence at:  99%  Medication side effects:  Never with Genvoya or Atripla  OTC medications: multivitamin    Social history:  Background: Originally from [place] ([place]), now lives in [place]  Lives in/with:  alone  Employment:  Works for  [employer] at [employer]  Education:   Interests:   International travel:  last visited 's parents in [place]   Pets: none  Alcohol: occasional weekends  Tobacco:  Never regular smoker  Other substances:  never  Sexual Hx:  Relationship: recently   History of STI diagnosis and treatment:  remote history of chlamydia ()      PAST MEDICAL HISTORY  Past Medical History:   Diagnosis Date    Depression     Human immunodeficiency virus (HIV) disease (H) Diagnosed .    Started on Atripla 09.      Perirectal abscess .    Responded to therapy with bactrim.   Otherwise as per HPI    PAST SURGICAL HISTORY  Denies history of surgeries  Otherwise as per HPI    ALLERGIES AND DRUG REACTIONS  Allergies   Allergen Reactions    Nkda [No Known Drug Allergy]        FAMILY HISTORY  No known immunocompromising conditions or infections unless listed below  Mother with diabetes    SOCIAL AND FUNCTIONAL HISTORY  Social History     Tobacco Use    Smoking status: Former     Current packs/day: 0.00     Types: Cigarettes     Start date: 1997     Quit date: 2000     Years since quittin.1    Smokeless tobacco: Never   Substance Use Topics    Alcohol use: Yes     Comment: 2 drinks week, usually beer    Drug use: No     Socioeconomic History    Marital status:    As per HPI    CURRENT ANTIBIOTICS  Other medications reviewed in EPIC  Biktarvy 1 tab daily      REVIEW OF SYSTEMS  ROS as above.      Objective   PHYSICAL EXAMINATION     height is 1.753 m (5' 9\") and weight is 75.3 kg (166 lb 1.6 oz). His oral temperature is 97.4  F (36.3  C). His blood pressure is 123/87 and his pulse is 79. His oxygen saturation is 98%.     Constitutional:  appearance not in acute distress  ENT: no conjunctival injection, no scleral icterus; no thrush  Neck: no lymphadenopathy  Pulmonary: unlabored breathing, clear to ascultation bilaterally  Gastrointestinal: abdomen " non-distended   Musculoskeletal: normal bulk and tone  Skin: no rashes visible on exposed skin  Neurologic: awake, alert and interactive      Other Significant Information (Labs, cultures, radiology, etc)    Recent micro:   7/7/09 HIV VL: 462,100  7/7/09 CD4 count: 234 (11%)  7/7/09 HBsAb: positive  7/7/09 HCV ab: negative  7/7/09 HAV ab: negative  7/7/09 CMV IgG ab: positive  7/7/09 toxo IgG ab: negative  7/7/09 syphilis ab: negative  7/16/09 quantiferon: negative  7/16/09 HIV genotype/phenotype: no clinically relevant resistance mutations reported  12/2/11 urine chlamydia: positive  4/7/16 HAV IgG ab: reactive  11/2/17 GC throat PCR: negative  12/22/17 urine chlamydia/gonorrhea: negtive  6/6/19 anal pap: negative cytology  6/4/20 Syphilis ab: negative  6/4/20 HIV VL: undetectable  6/4/20 CD4 count: 808 (39%)   5/26/21 Syphilis ab: negative  5/26/21 HIV VL: undetecable  5/26/21 CD4 count: 1035 (41%)  3/28/22 syphilis screen: negative  3/28/22 HIV VL: undetectable  3/28/22 CD4 count 1219 (44%)  4/10/23 HIV viral load: undetectable  4/10/23 CD4 count 1056 (43%)  8/3/23 quantiferon: negative  8/3/23 HCV screen: negative  8/3/23 syphilis screen: negative  8/3/23 HIV VL: 52  2/28/24 CD4 count 1004  2/28/24 HIV VL 58  2/28/24 urine gonorrhea/chlamydia: negative  6/5/24 syphilis screen: negative  6/5/24 HIV VL: undetectable  6/5/24 urine gonorrhea/chlamydia: negative  10/10/24 HIV VL: 38  10/10/24 CD4 count: 1052 (44%)  10/10/24 syphilis screen: negative  10/10/24 urine gonorrhea/chlamydia: negative      Again, thank you for allowing me to participate in the care of your patient.      Sincerely,    Bharathi Porter MD

## 2024-10-30 ENCOUNTER — TELEPHONE (OUTPATIENT)
Dept: INFECTIOUS DISEASES | Facility: CLINIC | Age: 49
End: 2024-10-30
Payer: COMMERCIAL

## 2024-10-30 NOTE — TELEPHONE ENCOUNTER
ALIDA LVM 10/30 to sched a 4-6 month (around 2/24/25 through 4/24/25) follow up with Dr. Porter via in-person with labs prior per checkout notes from 10/24. Sched follow up as RETURN B20. --2nd attempt.

## 2025-07-11 DIAGNOSIS — Z00.00 HEALTHCARE MAINTENANCE: ICD-10-CM

## 2025-07-11 DIAGNOSIS — B20 HUMAN IMMUNODEFICIENCY VIRUS (HIV) DISEASE (H): ICD-10-CM

## 2025-07-13 ENCOUNTER — HEALTH MAINTENANCE LETTER (OUTPATIENT)
Age: 50
End: 2025-07-13